# Patient Record
Sex: MALE | Race: WHITE | Employment: FULL TIME | URBAN - METROPOLITAN AREA
[De-identification: names, ages, dates, MRNs, and addresses within clinical notes are randomized per-mention and may not be internally consistent; named-entity substitution may affect disease eponyms.]

---

## 2018-03-29 ENCOUNTER — OFFICE VISIT (OUTPATIENT)
Dept: FAMILY MEDICINE CLINIC | Age: 43
End: 2018-03-29

## 2018-03-29 VITALS
SYSTOLIC BLOOD PRESSURE: 160 MMHG | OXYGEN SATURATION: 98 % | HEART RATE: 73 BPM | WEIGHT: 315 LBS | DIASTOLIC BLOOD PRESSURE: 100 MMHG | HEIGHT: 76 IN | BODY MASS INDEX: 38.36 KG/M2

## 2018-03-29 DIAGNOSIS — Z13.1 DIABETES MELLITUS SCREENING: ICD-10-CM

## 2018-03-29 DIAGNOSIS — E66.01 MORBID OBESITY (HCC): ICD-10-CM

## 2018-03-29 DIAGNOSIS — I10 ESSENTIAL HYPERTENSION: Primary | ICD-10-CM

## 2018-03-29 DIAGNOSIS — Z11.4 ENCOUNTER FOR SCREENING FOR HIV: ICD-10-CM

## 2018-03-29 DIAGNOSIS — Z13.29 THYROID DISORDER SCREEN: ICD-10-CM

## 2018-03-29 DIAGNOSIS — Z13.220 LIPID SCREENING: ICD-10-CM

## 2018-03-29 PROCEDURE — 99203 OFFICE O/P NEW LOW 30 MIN: CPT | Performed by: PHYSICIAN ASSISTANT

## 2018-03-29 RX ORDER — VALSARTAN 160 MG/1
160 TABLET ORAL DAILY
Qty: 30 TABLET | Refills: 2 | Status: SHIPPED | OUTPATIENT
Start: 2018-03-29 | End: 2018-07-06 | Stop reason: SDUPTHER

## 2018-03-29 RX ORDER — VALSARTAN 160 MG/1
160 TABLET ORAL DAILY
COMMUNITY
End: 2018-03-29 | Stop reason: SDUPTHER

## 2018-03-29 ASSESSMENT — PATIENT HEALTH QUESTIONNAIRE - PHQ9
1. LITTLE INTEREST OR PLEASURE IN DOING THINGS: 0
2. FEELING DOWN, DEPRESSED OR HOPELESS: 0
SUM OF ALL RESPONSES TO PHQ9 QUESTIONS 1 & 2: 0
SUM OF ALL RESPONSES TO PHQ QUESTIONS 1-9: 0

## 2018-03-29 ASSESSMENT — ENCOUNTER SYMPTOMS
CONSTIPATION: 0
SHORTNESS OF BREATH: 0
SORE THROAT: 0
COUGH: 0
RHINORRHEA: 0
ABDOMINAL PAIN: 0
DIARRHEA: 0
NAUSEA: 0
VOMITING: 0

## 2018-03-29 NOTE — PROGRESS NOTES
Topics    Smoking status: Never Smoker    Smokeless tobacco: Former User     Quit date: 2010    Alcohol use 0.6 oz/week     1 Shots of liquor per week      Comment: occasional    Drug use: No    Sexual activity: Yes     Partners: Female     Other Topics Concern    Not on file     Social History Narrative    No narrative on file     No Known Allergies    Current Outpatient Prescriptions   Medication Sig Dispense Refill    valsartan (DIOVAN) 160 MG tablet Take 1 tablet by mouth daily 30 tablet 2     No current facility-administered medications for this visit. Vitals:    03/29/18 1252   BP: (!) 160/100   Site: Left Arm   Position: Sitting   Cuff Size: Thigh   Pulse: 73   SpO2: 98%   Weight: (!) 414 lb (187.8 kg)   Height: 6' 4\" (1.93 m)     Estimated body mass index is 50.39 kg/m² as calculated from the following:    Height as of this encounter: 6' 4\" (1.93 m). Weight as of this encounter: 414 lb (187.8 kg). Physical Exam   Constitutional: He is oriented to person, place, and time. He appears well-developed and well-nourished. No distress. Morbidly obese male   HENT:   Head: Normocephalic and atraumatic. Eyes: Conjunctivae and EOM are normal. Pupils are equal, round, and reactive to light. Neck: Neck supple. Cardiovascular: Normal rate, regular rhythm and normal heart sounds. No murmur heard. Pulmonary/Chest: Effort normal and breath sounds normal. He has no wheezes. Abdominal: Soft. Bowel sounds are normal. There is no tenderness. Musculoskeletal: He exhibits no edema. Normal ROM of Bilateral upper and lower extremities and spine    Lymphadenopathy:     He has no cervical adenopathy. Neurological: He is alert and oriented to person, place, and time. He has normal reflexes. Skin: Skin is warm and dry. No rash noted. Psychiatric: He has a normal mood and affect. ASSESSMENT and PLAN:  Stephen Cardoso was seen today for established new doctor.     Diagnoses and all orders for this

## 2018-04-05 DIAGNOSIS — Z13.1 DIABETES MELLITUS SCREENING: ICD-10-CM

## 2018-04-05 DIAGNOSIS — Z13.220 LIPID SCREENING: ICD-10-CM

## 2018-04-05 DIAGNOSIS — E66.01 MORBID OBESITY (HCC): ICD-10-CM

## 2018-04-05 DIAGNOSIS — Z11.4 ENCOUNTER FOR SCREENING FOR HIV: ICD-10-CM

## 2018-04-05 DIAGNOSIS — Z13.29 THYROID DISORDER SCREEN: ICD-10-CM

## 2018-04-05 DIAGNOSIS — I10 ESSENTIAL HYPERTENSION: ICD-10-CM

## 2018-04-05 LAB
A/G RATIO: 1.7 (ref 1.1–2.2)
ALBUMIN SERPL-MCNC: 4.4 G/DL (ref 3.4–5)
ALP BLD-CCNC: 58 U/L (ref 40–129)
ALT SERPL-CCNC: 43 U/L (ref 10–40)
ANION GAP SERPL CALCULATED.3IONS-SCNC: 12 MMOL/L (ref 3–16)
AST SERPL-CCNC: 23 U/L (ref 15–37)
BILIRUB SERPL-MCNC: 0.5 MG/DL (ref 0–1)
BUN BLDV-MCNC: 15 MG/DL (ref 7–20)
CALCIUM SERPL-MCNC: 8.9 MG/DL (ref 8.3–10.6)
CHLORIDE BLD-SCNC: 103 MMOL/L (ref 99–110)
CHOLESTEROL, TOTAL: 177 MG/DL (ref 0–199)
CO2: 30 MMOL/L (ref 21–32)
CREAT SERPL-MCNC: 0.7 MG/DL (ref 0.9–1.3)
GFR AFRICAN AMERICAN: >60
GFR NON-AFRICAN AMERICAN: >60
GLOBULIN: 2.6 G/DL
GLUCOSE BLD-MCNC: 138 MG/DL (ref 70–99)
HCT VFR BLD CALC: 44.2 % (ref 40.5–52.5)
HDLC SERPL-MCNC: 30 MG/DL (ref 40–60)
HEMOGLOBIN: 15.1 G/DL (ref 13.5–17.5)
LDL CHOLESTEROL CALCULATED: 119 MG/DL
MCH RBC QN AUTO: 29.3 PG (ref 26–34)
MCHC RBC AUTO-ENTMCNC: 34.2 G/DL (ref 31–36)
MCV RBC AUTO: 85.5 FL (ref 80–100)
PDW BLD-RTO: 14 % (ref 12.4–15.4)
PLATELET # BLD: 270 K/UL (ref 135–450)
PMV BLD AUTO: 7.1 FL (ref 5–10.5)
POTASSIUM SERPL-SCNC: 4.5 MMOL/L (ref 3.5–5.1)
RBC # BLD: 5.17 M/UL (ref 4.2–5.9)
SODIUM BLD-SCNC: 145 MMOL/L (ref 136–145)
TOTAL PROTEIN: 7 G/DL (ref 6.4–8.2)
TRIGL SERPL-MCNC: 140 MG/DL (ref 0–150)
TSH REFLEX: 3.1 UIU/ML (ref 0.27–4.2)
VLDLC SERPL CALC-MCNC: 28 MG/DL
WBC # BLD: 6.2 K/UL (ref 4–11)

## 2018-04-06 ENCOUNTER — TELEPHONE (OUTPATIENT)
Dept: FAMILY MEDICINE CLINIC | Age: 43
End: 2018-04-06

## 2018-04-06 DIAGNOSIS — E11.9 TYPE 2 DIABETES MELLITUS WITHOUT COMPLICATION, WITHOUT LONG-TERM CURRENT USE OF INSULIN (HCC): Primary | ICD-10-CM

## 2018-04-06 LAB
ESTIMATED AVERAGE GLUCOSE: 231.7 MG/DL
HBA1C MFR BLD: 9.7 %
HIV AG/AB: NORMAL
HIV ANTIGEN: NORMAL
HIV-1 ANTIBODY: NORMAL
HIV-2 AB: NORMAL

## 2018-07-06 RX ORDER — VALSARTAN 160 MG/1
TABLET ORAL
Qty: 30 TABLET | Refills: 1 | Status: SHIPPED | OUTPATIENT
Start: 2018-07-06 | End: 2018-08-03 | Stop reason: ALTCHOICE

## 2018-08-03 DIAGNOSIS — I10 ESSENTIAL HYPERTENSION: Primary | ICD-10-CM

## 2018-08-03 RX ORDER — OLMESARTAN MEDOXOMIL 40 MG/1
40 TABLET ORAL DAILY
Qty: 30 TABLET | Refills: 3 | Status: SHIPPED | OUTPATIENT
Start: 2018-08-03 | End: 2018-08-06

## 2018-08-03 NOTE — TELEPHONE ENCOUNTER
Prescription for olmesartan 40 mg daily sent to pharmacy. Patient is due for OV please have him schedule.

## 2018-08-03 NOTE — TELEPHONE ENCOUNTER
Received fax from [facility] stating that Rx olmesartan medoxomi requires a Prior Auth - scanned into Epic

## 2018-08-06 ENCOUNTER — TELEPHONE (OUTPATIENT)
Dept: FAMILY MEDICINE CLINIC | Age: 43
End: 2018-08-06

## 2018-08-06 RX ORDER — LOSARTAN POTASSIUM 50 MG/1
50 TABLET ORAL DAILY
Qty: 30 TABLET | Refills: 3 | Status: SHIPPED | OUTPATIENT
Start: 2018-08-06 | End: 2018-10-25

## 2018-08-06 NOTE — TELEPHONE ENCOUNTER
Submitted PA via [facility] for Olmesartan Medoxomil 40MG OR TABS. Marti MEJIA Case ID: [id number]  Rx #: [id number].  Status PENDING

## 2018-10-22 ENCOUNTER — TELEPHONE (OUTPATIENT)
Dept: FAMILY MEDICINE CLINIC | Age: 43
End: 2018-10-22

## 2018-10-25 ENCOUNTER — OFFICE VISIT (OUTPATIENT)
Dept: FAMILY MEDICINE CLINIC | Age: 43
End: 2018-10-25
Payer: COMMERCIAL

## 2018-10-25 VITALS
DIASTOLIC BLOOD PRESSURE: 110 MMHG | HEART RATE: 78 BPM | BODY MASS INDEX: 38.36 KG/M2 | HEIGHT: 76 IN | OXYGEN SATURATION: 96 % | SYSTOLIC BLOOD PRESSURE: 152 MMHG | WEIGHT: 315 LBS

## 2018-10-25 DIAGNOSIS — I10 ESSENTIAL HYPERTENSION: ICD-10-CM

## 2018-10-25 DIAGNOSIS — Z79.4 TYPE 2 DIABETES MELLITUS WITHOUT COMPLICATION, WITH LONG-TERM CURRENT USE OF INSULIN (HCC): Primary | ICD-10-CM

## 2018-10-25 DIAGNOSIS — E11.9 TYPE 2 DIABETES MELLITUS WITHOUT COMPLICATION, WITH LONG-TERM CURRENT USE OF INSULIN (HCC): Primary | ICD-10-CM

## 2018-10-25 LAB — HBA1C MFR BLD: 5.4 %

## 2018-10-25 PROCEDURE — 99214 OFFICE O/P EST MOD 30 MIN: CPT | Performed by: PHYSICIAN ASSISTANT

## 2018-10-25 PROCEDURE — 83036 HEMOGLOBIN GLYCOSYLATED A1C: CPT | Performed by: PHYSICIAN ASSISTANT

## 2018-10-25 RX ORDER — BLOOD-GLUCOSE METER
1 EACH MISCELLANEOUS DAILY
Qty: 1 KIT | Refills: 0 | Status: SHIPPED | OUTPATIENT
Start: 2018-10-25

## 2018-10-25 RX ORDER — VALSARTAN 160 MG/1
TABLET ORAL
Qty: 30 TABLET | Refills: 1 | Status: SHIPPED | OUTPATIENT
Start: 2018-10-25 | End: 2018-12-19 | Stop reason: SDUPTHER

## 2018-10-25 RX ORDER — LANCETS 30 GAUGE
EACH MISCELLANEOUS
Qty: 100 EACH | Refills: 1 | Status: SHIPPED | OUTPATIENT
Start: 2018-10-25 | End: 2022-01-13 | Stop reason: SDUPTHER

## 2018-10-25 RX ORDER — AMLODIPINE BESYLATE 5 MG/1
5 TABLET ORAL DAILY
Qty: 30 TABLET | Refills: 3 | Status: SHIPPED | OUTPATIENT
Start: 2018-10-25 | End: 2019-04-02 | Stop reason: SDUPTHER

## 2018-10-25 NOTE — PROGRESS NOTES
10/26/2018  Nancy Azul (: 1975)  37 y.o. HPI  Patient presents for routine follow up of chronic conditions. HTN: Recently switched from valsartan to losartan due to recall. Patient does not monitor at home but reports his BP Has been high at doctor's appointments and occasionally at work. Denies chest pain, HA, LE swelling. DM: newly diagnosed in 3/2018. Started on metformin. Patient has only been taking one tablet once daily. Has been trying to follow low carb diet. Has lost weight. No current exercise regimen. Gets annual eye exams as he wears glasses ([facility]     Review of Systems    Allergies, past medical history, family history, and social history reviewed and unchanged from previous encounter. Current Outpatient Prescriptions   Medication Sig Dispense Refill    valsartan (DIOVAN) 160 MG tablet TAKE ONE TABLET BY MOUTH DAILY 30 tablet 1    amLODIPine (NORVASC) 5 MG tablet Take 1 tablet by mouth daily 30 tablet 3    Blood Glucose Monitoring Suppl (ACCU-CHEK ERLIN PLUS) w/Device KIT 1 Device by Does not apply route daily 1 kit 0    blood glucose test strips (ASCENSIA AUTODISC VI;ONE TOUCH ULTRA TEST VI) strip Check glucose daily. DM 2 E11.9 100 each 3    Lancets MISC Check glucose daily. DM 2 E11.65. 100 each 1    diclofenac (VOLTAREN) 75 MG EC tablet Take 1 tablet by mouth 2 times daily 60 tablet 3    metFORMIN (GLUCOPHAGE) 500 MG tablet Take 1 tablet by mouth 2 times daily (with meals) Start 500 mg once daily x 1 week, if tolerating well then start 500mg BID with meals. 60 tablet 3     No current facility-administered medications for this visit.         Vitals:    10/25/18 0906 10/25/18 0910   BP: (!) 170/100 (!) 152/110   Site: Right Upper Arm Right Upper Arm   Position: Sitting Sitting   Cuff Size: Large Adult Large Adult   Pulse: 78    SpO2: 96%    Weight: (!) 395 lb 3.2 oz (179.3 kg)    Height: 6' 4\" (1.93 m)      Estimated body mass index is 48.11 kg/m² as

## 2018-12-19 DIAGNOSIS — I10 ESSENTIAL HYPERTENSION: ICD-10-CM

## 2018-12-20 RX ORDER — VALSARTAN 160 MG/1
TABLET ORAL
Qty: 30 TABLET | Refills: 0 | Status: SHIPPED | OUTPATIENT
Start: 2018-12-20 | End: 2019-01-18 | Stop reason: SDUPTHER

## 2018-12-20 NOTE — TELEPHONE ENCOUNTER
.  Last office visit 10/25/2018     Last written 10/25/18 #30 with 1 refill    Next office visit scheduled none    Requested Prescriptions     Pending Prescriptions Disp Refills    valsartan (DIOVAN) 160 MG tablet [Pharmacy Med Name: VALSARTAN 160MG TABLETS] 30 tablet 0     Sig: TAKE 1 TABLET BY MOUTH EVERY DAY

## 2019-01-18 DIAGNOSIS — I10 ESSENTIAL HYPERTENSION: ICD-10-CM

## 2019-01-21 RX ORDER — VALSARTAN 160 MG/1
TABLET ORAL
Qty: 30 TABLET | Refills: 0 | Status: SHIPPED | OUTPATIENT
Start: 2019-01-21 | End: 2019-02-23 | Stop reason: SDUPTHER

## 2019-02-23 DIAGNOSIS — I10 ESSENTIAL HYPERTENSION: ICD-10-CM

## 2019-02-25 RX ORDER — VALSARTAN 160 MG/1
TABLET ORAL
Qty: 30 TABLET | Refills: 0 | Status: SHIPPED | OUTPATIENT
Start: 2019-02-25 | End: 2019-03-30 | Stop reason: SDUPTHER

## 2019-03-30 DIAGNOSIS — I10 ESSENTIAL HYPERTENSION: ICD-10-CM

## 2019-04-01 RX ORDER — VALSARTAN 160 MG/1
TABLET ORAL
Qty: 30 TABLET | Refills: 2 | Status: SHIPPED | OUTPATIENT
Start: 2019-04-01 | End: 2019-04-15 | Stop reason: SDUPTHER

## 2019-04-02 DIAGNOSIS — I10 ESSENTIAL HYPERTENSION: ICD-10-CM

## 2019-04-02 NOTE — TELEPHONE ENCOUNTER
.  Last office visit 10/25/2018     Last written 10/25/18 #30 3 refills    Next office visit scheduled 4/15/2019    Requested Prescriptions     Pending Prescriptions Disp Refills    amLODIPine (NORVASC) 5 MG tablet [Pharmacy Med Name: amLODIPine BESYLATE 5 MG TAB] 30 tablet 2     Sig: TAKE ONE TABLET BY MOUTH DAILY

## 2019-04-03 RX ORDER — AMLODIPINE BESYLATE 5 MG/1
TABLET ORAL
Qty: 30 TABLET | Refills: 2 | Status: SHIPPED | OUTPATIENT
Start: 2019-04-03 | End: 2019-07-08 | Stop reason: SDUPTHER

## 2019-04-15 ENCOUNTER — OFFICE VISIT (OUTPATIENT)
Dept: FAMILY MEDICINE CLINIC | Age: 44
End: 2019-04-15
Payer: COMMERCIAL

## 2019-04-15 VITALS
HEART RATE: 76 BPM | BODY MASS INDEX: 38.36 KG/M2 | SYSTOLIC BLOOD PRESSURE: 170 MMHG | WEIGHT: 315 LBS | DIASTOLIC BLOOD PRESSURE: 110 MMHG | HEIGHT: 76 IN | RESPIRATION RATE: 16 BRPM | OXYGEN SATURATION: 99 % | TEMPERATURE: 98.3 F

## 2019-04-15 DIAGNOSIS — E78.1 PURE HYPERGLYCERIDEMIA: ICD-10-CM

## 2019-04-15 DIAGNOSIS — I10 ESSENTIAL HYPERTENSION: ICD-10-CM

## 2019-04-15 DIAGNOSIS — E11.9 TYPE 2 DIABETES MELLITUS WITHOUT COMPLICATION, WITHOUT LONG-TERM CURRENT USE OF INSULIN (HCC): Primary | ICD-10-CM

## 2019-04-15 LAB — HBA1C MFR BLD: 7.3 %

## 2019-04-15 PROCEDURE — 83036 HEMOGLOBIN GLYCOSYLATED A1C: CPT | Performed by: PHYSICIAN ASSISTANT

## 2019-04-15 PROCEDURE — 99214 OFFICE O/P EST MOD 30 MIN: CPT | Performed by: PHYSICIAN ASSISTANT

## 2019-04-15 RX ORDER — VALSARTAN 160 MG/1
160 TABLET ORAL 2 TIMES DAILY
Qty: 180 TABLET | Refills: 1 | Status: SHIPPED | OUTPATIENT
Start: 2019-04-15 | End: 2019-07-18

## 2019-04-15 RX ORDER — ATORVASTATIN CALCIUM 20 MG/1
20 TABLET, FILM COATED ORAL DAILY
Qty: 30 TABLET | Refills: 2 | Status: SHIPPED | OUTPATIENT
Start: 2019-04-15 | End: 2019-07-10 | Stop reason: SDUPTHER

## 2019-04-15 ASSESSMENT — PATIENT HEALTH QUESTIONNAIRE - PHQ9
SUM OF ALL RESPONSES TO PHQ9 QUESTIONS 1 & 2: 0
SUM OF ALL RESPONSES TO PHQ QUESTIONS 1-9: 0
1. LITTLE INTEREST OR PLEASURE IN DOING THINGS: 0
2. FEELING DOWN, DEPRESSED OR HOPELESS: 0
SUM OF ALL RESPONSES TO PHQ QUESTIONS 1-9: 0

## 2019-04-15 ASSESSMENT — ENCOUNTER SYMPTOMS
ABDOMINAL PAIN: 0
SHORTNESS OF BREATH: 0
CONSTIPATION: 0
DIARRHEA: 0
VOMITING: 0
NAUSEA: 0
RHINORRHEA: 0
COUGH: 0
SORE THROAT: 0

## 2019-04-15 NOTE — PROGRESS NOTES
strip Check glucose daily. DM 2 E11.9 100 each 3    Lancets MISC Check glucose daily. DM 2 E11.65. 100 each 1    metFORMIN (GLUCOPHAGE) 500 MG tablet Take 1 tablet by mouth 2 times daily (with meals) Start 500 mg once daily x 1 week, if tolerating well then start 500mg BID with meals. 60 tablet 3     No current facility-administered medications for this visit. Vitals:    04/15/19 0826   BP: (!) 170/110   Site: Left Upper Arm   Position: Sitting   Cuff Size: Large Adult   Pulse: 76   Resp: 16   Temp: 98.3 °F (36.8 °C)   TempSrc: Oral   SpO2: 99%   Weight: (!) 427 lb 9.6 oz (194 kg)   Height: 6' 4\" (1.93 m)     Estimated body mass index is 52.05 kg/m² as calculated from the following:    Height as of this encounter: 6' 4\" (1.93 m). Weight as of this encounter: 427 lb 9.6 oz (194 kg). Physical Exam   Constitutional: He is oriented to person, place, and time. He appears well-developed and well-nourished. No distress. obese   HENT:   Head: Normocephalic and atraumatic. Eyes: Pupils are equal, round, and reactive to light. Conjunctivae and EOM are normal.   Neck: Neck supple. Cardiovascular: Normal rate, regular rhythm and normal heart sounds. No murmur heard. Pulmonary/Chest: Effort normal and breath sounds normal. He has no wheezes. Abdominal: Soft. Bowel sounds are normal. There is no tenderness. Musculoskeletal: He exhibits no edema. Lymphadenopathy:     He has no cervical adenopathy. Neurological: He is alert and oriented to person, place, and time. He has normal reflexes. Skin: Skin is warm and dry. No rash noted. Psychiatric: He has a normal mood and affect. ASSESSMENT and PLAN:  Dejan Khan was seen today for hypertension.     Diagnoses and all orders for this visit:    Type 2 diabetes mellitus without complication, without long-term current use of insulin ([facility])  -     POCT glycosylated hemoglobin (Hb A1C) 7.3  - Encouraged BG monitoring qam and adherence to low carbohydrate diet.   - Continue metformin 500 mg BID  - Needs eye exam     Essential hypertension  -     valsartan (DIOVAN) 160 MG tablet; Take 1 tablet by mouth 2 times daily  - Increase diovan today, may need to double amlodipine at next appointment  - Pt instructed to keep BP log and bring to next appointment    Pure hyperglyceridemia  -     atorvastatin (LIPITOR) 20 MG tablet; Take 1 tablet by mouth daily  - LDL elevated on last year labs, TC normal. Given diabetes and obesity will start statin. Fasting labs at 2 week follow up. Return in about 2 weeks (around 4/29/2019) for HTN.

## 2019-04-29 ENCOUNTER — OFFICE VISIT (OUTPATIENT)
Dept: FAMILY MEDICINE CLINIC | Age: 44
End: 2019-04-29
Payer: COMMERCIAL

## 2019-04-29 VITALS
HEART RATE: 94 BPM | DIASTOLIC BLOOD PRESSURE: 108 MMHG | SYSTOLIC BLOOD PRESSURE: 160 MMHG | HEIGHT: 76 IN | BODY MASS INDEX: 38.36 KG/M2 | WEIGHT: 315 LBS | OXYGEN SATURATION: 95 %

## 2019-04-29 DIAGNOSIS — M54.50 LUMBAR BACK PAIN: ICD-10-CM

## 2019-04-29 DIAGNOSIS — I10 ESSENTIAL HYPERTENSION: Primary | ICD-10-CM

## 2019-04-29 DIAGNOSIS — E78.5 HYPERLIPIDEMIA, UNSPECIFIED HYPERLIPIDEMIA TYPE: ICD-10-CM

## 2019-04-29 LAB
A/G RATIO: 1.5 (ref 1.1–2.2)
ALBUMIN SERPL-MCNC: 4.5 G/DL (ref 3.4–5)
ALP BLD-CCNC: 70 U/L (ref 40–129)
ALT SERPL-CCNC: 28 U/L (ref 10–40)
ANION GAP SERPL CALCULATED.3IONS-SCNC: 12 MMOL/L (ref 3–16)
AST SERPL-CCNC: 17 U/L (ref 15–37)
BILIRUB SERPL-MCNC: 0.7 MG/DL (ref 0–1)
BUN BLDV-MCNC: 13 MG/DL (ref 7–20)
CALCIUM SERPL-MCNC: 8.9 MG/DL (ref 8.3–10.6)
CHLORIDE BLD-SCNC: 99 MMOL/L (ref 99–110)
CHOLESTEROL, TOTAL: 172 MG/DL (ref 0–199)
CO2: 29 MMOL/L (ref 21–32)
CREAT SERPL-MCNC: 0.8 MG/DL (ref 0.9–1.3)
GFR AFRICAN AMERICAN: >60
GFR NON-AFRICAN AMERICAN: >60
GLOBULIN: 3.1 G/DL
GLUCOSE BLD-MCNC: 271 MG/DL (ref 70–99)
HCT VFR BLD CALC: 45.9 % (ref 40.5–52.5)
HDLC SERPL-MCNC: 30 MG/DL (ref 40–60)
HEMOGLOBIN: 15.4 G/DL (ref 13.5–17.5)
LDL CHOLESTEROL CALCULATED: 110 MG/DL
MCH RBC QN AUTO: 28.1 PG (ref 26–34)
MCHC RBC AUTO-ENTMCNC: 33.5 G/DL (ref 31–36)
MCV RBC AUTO: 83.9 FL (ref 80–100)
PDW BLD-RTO: 14.2 % (ref 12.4–15.4)
PLATELET # BLD: 282 K/UL (ref 135–450)
PMV BLD AUTO: 7.4 FL (ref 5–10.5)
POTASSIUM SERPL-SCNC: 4 MMOL/L (ref 3.5–5.1)
RBC # BLD: 5.47 M/UL (ref 4.2–5.9)
SODIUM BLD-SCNC: 140 MMOL/L (ref 136–145)
TOTAL PROTEIN: 7.6 G/DL (ref 6.4–8.2)
TRIGL SERPL-MCNC: 161 MG/DL (ref 0–150)
VLDLC SERPL CALC-MCNC: 32 MG/DL
WBC # BLD: 6 K/UL (ref 4–11)

## 2019-04-29 PROCEDURE — 99213 OFFICE O/P EST LOW 20 MIN: CPT | Performed by: PHYSICIAN ASSISTANT

## 2019-04-29 RX ORDER — CYCLOBENZAPRINE HCL 5 MG
5 TABLET ORAL 3 TIMES DAILY PRN
Qty: 30 TABLET | Refills: 0 | Status: SHIPPED | OUTPATIENT
Start: 2019-04-29 | End: 2019-05-09

## 2019-04-29 ASSESSMENT — ENCOUNTER SYMPTOMS
SORE THROAT: 0
VOMITING: 0
COUGH: 0
DIARRHEA: 0
BACK PAIN: 1
ABDOMINAL PAIN: 0
NAUSEA: 0
RHINORRHEA: 0
CONSTIPATION: 0
SHORTNESS OF BREATH: 0

## 2019-04-29 NOTE — PROGRESS NOTES
2019  Rob Post Sons (: 1975)  37 y.o. HPI     Patient presents for 1 month blood pressure check. Started on amlodipine in addition to valsartan at last appointment. Just started last week. Has been checking at home, averaging 150s/90s. Denies HA, CP, SOA and LE swelling. Lumbar back pain: patient reports that 3 days was doing home repair and pulled muscle in low back. This is not the first time it has happened. Patient reports it is right sided, does not radiate into the buttock or down the leg. Denies bowel/bladder incontinence, saddle anesthesia, LE weakness and numbness tingling. Has not been taking anything for the pain. Patient declines vaccines today and states that he cannot give urine sample. Review of Systems   Constitutional: Negative for activity change, chills and fever. HENT: Negative for congestion, ear pain, rhinorrhea and sore throat. Eyes: Negative for visual disturbance. Respiratory: Negative for cough and shortness of breath. Cardiovascular: Negative for chest pain and palpitations. Gastrointestinal: Negative for abdominal pain, constipation, diarrhea, nausea and vomiting. Genitourinary: Negative for difficulty urinating and dysuria. Musculoskeletal: Positive for back pain (lumbar). Negative for arthralgias and myalgias. Skin: Negative for rash. Neurological: Negative for dizziness, weakness and numbness. Psychiatric/Behavioral: Negative for sleep disturbance. Allergies, past medical history, family history, and social history reviewed and unchanged from previous encounter.      Current Outpatient Medications   Medication Sig Dispense Refill    cyclobenzaprine (FLEXERIL) 5 MG tablet Take 1 tablet by mouth 3 times daily as needed for Muscle spasms 30 tablet 0    valsartan (DIOVAN) 160 MG tablet Take 1 tablet by mouth 2 times daily 180 tablet 1    atorvastatin (LIPITOR) 20 MG tablet Take 1 tablet by mouth daily 30 tablet 2    amLODIPine (NORVASC) 5 MG tablet TAKE ONE TABLET BY MOUTH DAILY 30 tablet 2    Blood Glucose Monitoring Suppl (ACCU-CHEK ERLIN PLUS) w/Device KIT 1 Device by Does not apply route daily 1 kit 0    blood glucose test strips (ASCENSIA AUTODISC VI;ONE TOUCH ULTRA TEST VI) strip Check glucose daily. DM 2 E11.9 100 each 3    Lancets MISC Check glucose daily. DM 2 E11.65. 100 each 1    metFORMIN (GLUCOPHAGE) 500 MG tablet Take 1 tablet by mouth 2 times daily (with meals) Start 500 mg once daily x 1 week, if tolerating well then start 500mg BID with meals. 60 tablet 3     No current facility-administered medications for this visit. Vitals:    04/29/19 0819 04/29/19 0852   BP: (!) 160/94 (!) 160/108   Site: Left Upper Arm Left Upper Arm   Position: Sitting Sitting   Cuff Size: Large Adult Thigh   Pulse: 94    SpO2: 95%    Weight: (!) 423 lb 9.6 oz (192.1 kg)    Height: 6' 4\" (1.93 m)      Estimated body mass index is 51.56 kg/m² as calculated from the following:    Height as of this encounter: 6' 4\" (1.93 m). Weight as of this encounter: 423 lb 9.6 oz (192.1 kg). Physical Exam   Constitutional: He is oriented to person, place, and time. He appears well-developed and well-nourished. No distress. HENT:   Head: Normocephalic and atraumatic. Eyes: Pupils are equal, round, and reactive to light. Conjunctivae and EOM are normal.   Neck: Neck supple. Cardiovascular: Normal rate, regular rhythm and normal heart sounds. No murmur heard. Pulmonary/Chest: Effort normal and breath sounds normal. He has no wheezes. Abdominal: Soft. Bowel sounds are normal. There is no tenderness. Musculoskeletal: He exhibits no edema. Pain with palpation of right sided lumbar paraspinal muscles. No midline tenderness. Lymphadenopathy:     He has no cervical adenopathy. Neurological: He is alert and oriented to person, place, and time. He has normal reflexes. Skin: Skin is warm and dry. No rash noted.    Psychiatric: He has a normal mood and affect. ASSESSMENT and PLAN:  Erum Hall was seen today for blood pressure check. Diagnoses and all orders for this visit:    Essential hypertension  -     COMPREHENSIVE METABOLIC PANEL  -     CBC  - Continue log, offered My Chart flow sheet, declined   - Elevated in the office, log reviewed improved at home. Anticipate amlodipine to become more effective over the next week. Call office if BP does not come down further. Hyperlipidemia, unspecified hyperlipidemia type  -     LIPID PANEL    Lumbar back pain  -     cyclobenzaprine (FLEXERIL) 5 MG tablet; Take 1 tablet by mouth 3 times daily as needed for Muscle spasms  - Flexeril and 600 mg ibuprofen prn. May benefit from steroid, but given BP and DM do not feel appropriate.   - Gentle stretching in a few days. Return in about 3 months (around 7/29/2019) for HTN.

## 2019-07-08 DIAGNOSIS — I10 ESSENTIAL HYPERTENSION: ICD-10-CM

## 2019-07-08 RX ORDER — AMLODIPINE BESYLATE 5 MG/1
TABLET ORAL
Qty: 30 TABLET | Refills: 1 | Status: SHIPPED | OUTPATIENT
Start: 2019-07-08 | End: 2019-08-01

## 2019-07-10 DIAGNOSIS — E78.1 PURE HYPERGLYCERIDEMIA: ICD-10-CM

## 2019-07-10 RX ORDER — ATORVASTATIN CALCIUM 20 MG/1
TABLET, FILM COATED ORAL
Qty: 90 TABLET | Refills: 1 | Status: SHIPPED | OUTPATIENT
Start: 2019-07-10 | End: 2022-01-13 | Stop reason: SDUPTHER

## 2019-07-18 ENCOUNTER — OFFICE VISIT (OUTPATIENT)
Dept: FAMILY MEDICINE CLINIC | Age: 44
End: 2019-07-18
Payer: COMMERCIAL

## 2019-07-18 VITALS
OXYGEN SATURATION: 97 % | SYSTOLIC BLOOD PRESSURE: 158 MMHG | DIASTOLIC BLOOD PRESSURE: 68 MMHG | HEART RATE: 83 BPM | BODY MASS INDEX: 52.34 KG/M2 | WEIGHT: 315 LBS

## 2019-07-18 DIAGNOSIS — I10 ESSENTIAL HYPERTENSION: Primary | ICD-10-CM

## 2019-07-18 PROCEDURE — 99214 OFFICE O/P EST MOD 30 MIN: CPT | Performed by: FAMILY MEDICINE

## 2019-07-18 RX ORDER — LISINOPRIL 40 MG/1
40 TABLET ORAL DAILY
Qty: 30 TABLET | Refills: 0 | Status: SHIPPED | OUTPATIENT
Start: 2019-07-18 | End: 2019-08-01 | Stop reason: SDUPTHER

## 2019-07-18 RX ORDER — BLOOD PRESSURE TEST KIT
1 KIT MISCELLANEOUS DAILY
Qty: 1 KIT | Refills: 0 | Status: SHIPPED | OUTPATIENT
Start: 2019-07-18

## 2019-07-18 ASSESSMENT — ENCOUNTER SYMPTOMS
ABDOMINAL PAIN: 0
NAUSEA: 0
CHEST TIGHTNESS: 0
COUGH: 0
VOMITING: 0
SHORTNESS OF BREATH: 0

## 2019-07-18 NOTE — PROGRESS NOTES
organizations: Not on file     Relationship status: Not on file    Intimate partner violence:     Fear of current or ex partner: Not on file     Emotionally abused: Not on file     Physically abused: Not on file     Forced sexual activity: Not on file   Other Topics Concern    Not on file   Social History Narrative    Not on file       Family History   Problem Relation Age of Onset    Stroke Maternal Grandfather        Current Outpatient Medications   Medication Sig Dispense Refill    lisinopril (PRINIVIL;ZESTRIL) 40 MG tablet Take 1 tablet by mouth daily 30 tablet 0    Blood Pressure KIT 1 kit by Does not apply route daily 1 kit 0    atorvastatin (LIPITOR) 20 MG tablet TAKE ONE TABLET BY MOUTH DAILY 90 tablet 1    amLODIPine (NORVASC) 5 MG tablet TAKE ONE TABLET BY MOUTH DAILY 30 tablet 1    Blood Glucose Monitoring Suppl (ACCU-CHEK ERLIN PLUS) w/Device KIT 1 Device by Does not apply route daily 1 kit 0    blood glucose test strips (ASCENSIA AUTODISC VI;ONE TOUCH ULTRA TEST VI) strip Check glucose daily. DM 2 E11.9 100 each 3    Lancets MISC Check glucose daily. DM 2 E11.65. 100 each 1    metFORMIN (GLUCOPHAGE) 500 MG tablet Take 1 tablet by mouth 2 times daily (with meals) Start 500 mg once daily x 1 week, if tolerating well then start 500mg BID with meals. 60 tablet 3     No current facility-administered medications for this visit. There is no immunization history on file for this patient. No Known Allergies    Review of Systems   Constitutional: Negative for activity change, fatigue and fever. HENT: Negative for congestion and tinnitus. Eyes: Negative for visual disturbance. Respiratory: Negative for cough, chest tightness and shortness of breath. Cardiovascular: Negative for chest pain, palpitations and leg swelling. Gastrointestinal: Negative for abdominal pain, nausea and vomiting. Genitourinary: Negative for decreased urine volume and difficulty urinating.    Skin:

## 2019-07-18 NOTE — PATIENT INSTRUCTIONS
Patient Education         High Blood Pressure: The DASH Diet (02:03)  Your health professional recommends that you watch this short online health video. Learn how the DASH eating plan can help lower your blood pressure. How to watch the video    Scan the QR code   OR Visit the website    [email or web address] [email or web address]   Current as of: July 22, 2018  Content Version: 12.0  © 0040-1575 Healthwise, [employer]. Care instructions adapted under license by [facility] ([facility]). If you have questions about a medical condition or this instruction, always ask your healthcare professional. [place] [address] any warranty or liability for your use of this information. Eat 5 - 6 servings of fruit per day. Locally grown fresh fruit has the most antioxidants. If they are not available, frozen fruit is the next best.     Eat more fresh vegetables, olive oil, and a handful of nuts (unsalted) every day. Make all your grains (breads, pasta, rice) 'whole grain' only to increase natural fiber in your diet     Fish has healthy omega-3 oils. Eating fish twice a week has been shown to improve health. If you take fish oil, take at least 1,000mg EPA + DHA a day (you must look at the food label on the back of the bottle and add up these omega-3s to know how much of this beneficial nutrient is contained in the product). There is more evidence that eating fish improves health more than taking fish oi supplements. Eating eggs has benefit if you eat the high omega-3 eggs. In these eggs, the yolks are good for you. At [employer], go to the health food section and get the 660mg omega-3 eggs. These are really good for you. The chickens are fed fish, and the benefits of the fish are imparted into the egg yolk. If you have diabetes, you should probably avoid eggs.      Current research shows that a pesco-vegetarian diet (eating a plant based diet with fish) is the best for improving longevity and reducing cardiovascular

## 2019-07-25 ENCOUNTER — TELEPHONE (OUTPATIENT)
Dept: FAMILY MEDICINE CLINIC | Age: 44
End: 2019-07-25

## 2019-07-25 ENCOUNTER — NURSE ONLY (OUTPATIENT)
Dept: FAMILY MEDICINE CLINIC | Age: 44
End: 2019-07-25

## 2019-07-25 VITALS — DIASTOLIC BLOOD PRESSURE: 105 MMHG | SYSTOLIC BLOOD PRESSURE: 160 MMHG

## 2019-07-25 DIAGNOSIS — Z01.30 BLOOD PRESSURE CHECK: Primary | ICD-10-CM

## 2019-08-01 ENCOUNTER — OFFICE VISIT (OUTPATIENT)
Dept: FAMILY MEDICINE CLINIC | Age: 44
End: 2019-08-01
Payer: COMMERCIAL

## 2019-08-01 VITALS
SYSTOLIC BLOOD PRESSURE: 162 MMHG | WEIGHT: 315 LBS | DIASTOLIC BLOOD PRESSURE: 90 MMHG | RESPIRATION RATE: 18 BRPM | HEIGHT: 76 IN | OXYGEN SATURATION: 97 % | TEMPERATURE: 97.7 F | BODY MASS INDEX: 38.36 KG/M2 | HEART RATE: 79 BPM

## 2019-08-01 DIAGNOSIS — I10 ESSENTIAL HYPERTENSION: Primary | ICD-10-CM

## 2019-08-01 DIAGNOSIS — E11.9 TYPE 2 DIABETES MELLITUS WITHOUT COMPLICATION, WITHOUT LONG-TERM CURRENT USE OF INSULIN (HCC): ICD-10-CM

## 2019-08-01 LAB — HBA1C MFR BLD: 7.8 %

## 2019-08-01 PROCEDURE — 83036 HEMOGLOBIN GLYCOSYLATED A1C: CPT | Performed by: PHYSICIAN ASSISTANT

## 2019-08-01 PROCEDURE — 99213 OFFICE O/P EST LOW 20 MIN: CPT | Performed by: PHYSICIAN ASSISTANT

## 2019-08-01 RX ORDER — HYDROCHLOROTHIAZIDE 25 MG/1
25 TABLET ORAL EVERY MORNING
Qty: 90 TABLET | Refills: 1 | Status: SHIPPED
Start: 2019-08-01 | End: 2020-05-15 | Stop reason: CLARIF

## 2019-08-01 RX ORDER — LISINOPRIL 40 MG/1
40 TABLET ORAL DAILY
Qty: 90 TABLET | Refills: 1 | Status: SHIPPED | OUTPATIENT
Start: 2019-08-01 | End: 2020-02-03

## 2019-08-01 RX ORDER — AMLODIPINE BESYLATE 10 MG/1
10 TABLET ORAL DAILY
Qty: 90 TABLET | Refills: 1 | Status: SHIPPED | OUTPATIENT
Start: 2019-08-01 | End: 2020-01-29 | Stop reason: SDUPTHER

## 2019-08-01 ASSESSMENT — ENCOUNTER SYMPTOMS
SORE THROAT: 0
RHINORRHEA: 0
COUGH: 0
VOMITING: 0
ABDOMINAL PAIN: 0
DIARRHEA: 0
NAUSEA: 0
CONSTIPATION: 0
SHORTNESS OF BREATH: 0

## 2019-08-01 NOTE — PROGRESS NOTES
every morning  -     amLODIPine (NORVASC) 10 MG tablet; Take 1 tablet by mouth daily  -     lisinopril (PRINIVIL;ZESTRIL) 40 MG tablet; Take 1 tablet by mouth daily  - Add hctz. Discussed the importance of home monitoring.   - The patient is advised to begin progressive daily aerobic exercise program, attempt to lose weight, reduce salt in diet and cooking and improve dietary compliance. Type 2 diabetes mellitus without complication, without long-term current use of insulin ([facility])  -     POCT microalbumin- patient unable to give sample   -     POCT glycosylated hemoglobin (Hb A1C) 7.8  - Increase metformin to 1000 mg BID  - See orders for this visit as documented in the electronic medical record. Issues reviewed with him: diabetic diet discussed in detail, written exchange diet given, home glucose monitoring emphasized, all medications, side effects and compliance discussed carefully, annual eye examinations at Ophthalmology discussed and long term diabetic complications discussed. Return in about 4 weeks (around 8/29/2019) for HTN.

## 2019-09-12 DIAGNOSIS — E11.9 TYPE 2 DIABETES MELLITUS WITHOUT COMPLICATION, WITHOUT LONG-TERM CURRENT USE OF INSULIN (HCC): ICD-10-CM

## 2020-01-29 RX ORDER — AMLODIPINE BESYLATE 10 MG/1
10 TABLET ORAL DAILY
Qty: 90 TABLET | Refills: 1 | Status: SHIPPED | OUTPATIENT
Start: 2020-01-29 | End: 2020-04-01 | Stop reason: SDUPTHER

## 2020-03-30 ENCOUNTER — TELEPHONE (OUTPATIENT)
Dept: FAMILY MEDICINE CLINIC | Age: 45
End: 2020-03-30

## 2020-04-01 ENCOUNTER — TELEMEDICINE (OUTPATIENT)
Dept: FAMILY MEDICINE CLINIC | Age: 45
End: 2020-04-01
Payer: COMMERCIAL

## 2020-04-01 PROCEDURE — 99213 OFFICE O/P EST LOW 20 MIN: CPT | Performed by: PHYSICIAN ASSISTANT

## 2020-04-01 RX ORDER — AMLODIPINE BESYLATE 10 MG/1
10 TABLET ORAL DAILY
Qty: 90 TABLET | Refills: 1 | Status: SHIPPED | OUTPATIENT
Start: 2020-04-01 | End: 2020-12-30 | Stop reason: SDUPTHER

## 2020-04-01 RX ORDER — ROSUVASTATIN CALCIUM 10 MG/1
10 TABLET, COATED ORAL DAILY
Qty: 90 TABLET | Refills: 1 | Status: SHIPPED | OUTPATIENT
Start: 2020-04-01 | End: 2022-01-13

## 2020-04-01 RX ORDER — LOSARTAN POTASSIUM 50 MG/1
50 TABLET ORAL DAILY
Qty: 90 TABLET | Refills: 1 | Status: SHIPPED | OUTPATIENT
Start: 2020-04-01 | End: 2020-04-06

## 2020-04-01 ASSESSMENT — ENCOUNTER SYMPTOMS
RHINORRHEA: 0
COUGH: 0
SHORTNESS OF BREATH: 0
SORE THROAT: 0
ABDOMINAL PAIN: 0
CONSTIPATION: 0
DIARRHEA: 0
NAUSEA: 0
VOMITING: 0

## 2020-04-01 NOTE — PROGRESS NOTES
chills and fever. HENT: Negative for congestion, ear pain, rhinorrhea and sore throat. Eyes: Negative for visual disturbance. Respiratory: Negative for cough and shortness of breath. Cardiovascular: Negative for chest pain and palpitations. Gastrointestinal: Negative for abdominal pain, constipation, diarrhea, nausea and vomiting. Genitourinary: Negative for difficulty urinating and dysuria. Musculoskeletal: Negative for arthralgias and myalgias. Skin: Negative for rash. Neurological: Negative for dizziness, weakness and numbness. Psychiatric/Behavioral: Negative for sleep disturbance. Allergies, past medical history, family history, and social history reviewed and unchanged from previous encounter. Current Outpatient Medications   Medication Sig Dispense Refill    losartan (COZAAR) 50 MG tablet Take 1 tablet by mouth daily 90 tablet 1    amLODIPine (NORVASC) 10 MG tablet Take 1 tablet by mouth daily 90 tablet 1    rosuvastatin (CRESTOR) 10 MG tablet Take 1 tablet by mouth daily 90 tablet 1    metFORMIN (GLUCOPHAGE) 500 MG tablet Take 1 tablet by mouth 2 times daily (with meals) 180 tablet 1    Blood Pressure KIT 1 kit by Does not apply route daily 1 kit 0    Blood Glucose Monitoring Suppl (ACCU-CHEK ERLIN PLUS) w/Device KIT 1 Device by Does not apply route daily 1 kit 0    blood glucose test strips (ASCENSIA AUTODISC VI;ONE TOUCH ULTRA TEST VI) strip Check glucose daily. DM 2 E11.9 100 each 3    Lancets MISC Check glucose daily. DM 2 E11.65. 100 each 1    hydrochlorothiazide (HYDRODIURIL) 25 MG tablet Take 1 tablet by mouth every morning (Patient not taking: Reported on 4/1/2020) 90 tablet 1    atorvastatin (LIPITOR) 20 MG tablet TAKE ONE TABLET BY MOUTH DAILY (Patient not taking: Reported on 4/1/2020) 90 tablet 1     No current facility-administered medications for this visit. There were no vitals filed for this visit.   Estimated body mass index is 51.66 kg/m² as

## 2020-04-04 ENCOUNTER — PATIENT MESSAGE (OUTPATIENT)
Dept: FAMILY MEDICINE CLINIC | Age: 45
End: 2020-04-04

## 2020-04-06 RX ORDER — ATENOLOL 50 MG/1
50 TABLET ORAL DAILY
Qty: 90 TABLET | Refills: 1 | Status: SHIPPED | OUTPATIENT
Start: 2020-04-06 | End: 2020-09-14

## 2020-04-07 ENCOUNTER — TELEPHONE (OUTPATIENT)
Dept: FAMILY MEDICINE CLINIC | Age: 45
End: 2020-04-07

## 2020-04-14 ENCOUNTER — TELEPHONE (OUTPATIENT)
Dept: FAMILY MEDICINE CLINIC | Age: 45
End: 2020-04-14

## 2020-04-14 NOTE — TELEPHONE ENCOUNTER
Pt called after hours line re high blood pressure. Reports his pressure 15 minutes ago was 191/110, HR 60s. He is completely asymptomatic: denies CP, SOB, palpitations, dizziness, HA, visual changes, diaphoresis, nausea, etc.    States he took Losartan 50 mg this am with atenolol 50 mg. His BP 2 hours later was 146/86. He takes his Norvasc 10 mg at 10 pm nightly. Due to low HR, will avoid second dose BB this evening, and he is on max dose Norvasc. I instructed him to take a second dose of losartan this evening and recheck BP 2 hours after. I also encouraged that he touch base with his PCP tomorrow to discuss long term game plan, with possible consideration for further work up for secondary HTN. He is agreeable to plan. He was instructed to call if he became symptomatic. Any CP/neuro deficit, he was told to go to the ER.

## 2020-04-15 ENCOUNTER — TELEMEDICINE (OUTPATIENT)
Dept: FAMILY MEDICINE CLINIC | Age: 45
End: 2020-04-15
Payer: COMMERCIAL

## 2020-04-15 ENCOUNTER — NURSE TRIAGE (OUTPATIENT)
Dept: OTHER | Facility: CLINIC | Age: 45
End: 2020-04-15

## 2020-04-15 ENCOUNTER — TELEPHONE (OUTPATIENT)
Dept: FAMILY MEDICINE CLINIC | Age: 45
End: 2020-04-15

## 2020-04-15 VITALS
SYSTOLIC BLOOD PRESSURE: 176 MMHG | HEART RATE: 72 BPM | DIASTOLIC BLOOD PRESSURE: 94 MMHG | BODY MASS INDEX: 51.66 KG/M2 | HEIGHT: 76 IN

## 2020-04-15 PROCEDURE — 99213 OFFICE O/P EST LOW 20 MIN: CPT | Performed by: PHYSICIAN ASSISTANT

## 2020-04-15 ASSESSMENT — ENCOUNTER SYMPTOMS
COUGH: 0
NAUSEA: 0
SHORTNESS OF BREATH: 0
CONSTIPATION: 0
ABDOMINAL PAIN: 0
SORE THROAT: 0
VOMITING: 0
DIARRHEA: 0
RHINORRHEA: 0

## 2020-04-15 ASSESSMENT — PATIENT HEALTH QUESTIONNAIRE - PHQ9
2. FEELING DOWN, DEPRESSED OR HOPELESS: 0
1. LITTLE INTEREST OR PLEASURE IN DOING THINGS: 0
SUM OF ALL RESPONSES TO PHQ QUESTIONS 1-9: 0
SUM OF ALL RESPONSES TO PHQ9 QUESTIONS 1 & 2: 0
SUM OF ALL RESPONSES TO PHQ QUESTIONS 1-9: 0

## 2020-04-15 NOTE — PROGRESS NOTES
4/15/2020    TELEHEALTH EVALUATION -- Audio/Visual (During IUWOD-02 public health emergency)    HPI:    Mookie Crawford (:  1975) has requested an audio/video evaluation for the following concern(s):    Remains with persistently elevated pressures. Pt currently on 3 BP medications, losartan, amlodipine, and atenolol. Max dose losartan and amlodipine with lower dose atenolol 2/2 to HR in the 60s. Pt is uncertain if his BP has been running this high, has not been checking regularly until the last couple of weeks. Is asymptomatic with elevated Bps including Ha, cp, vision changes, soa, le swelling. Historically has been noncompliant with follow up appointments and lab work. Low carb diet, no current exercise regimen. Last documented weight 424lbs with BMI of 51.66. States he was diagnosed with sleep apnea ~20 years ago but was noncompliant with cpap. Review of Systems   Constitutional: Negative for activity change, chills and fever. HENT: Negative for congestion, ear pain, rhinorrhea and sore throat. Eyes: Negative for visual disturbance. Respiratory: Negative for cough and shortness of breath. Cardiovascular: Negative for chest pain and palpitations. Gastrointestinal: Negative for abdominal pain, constipation, diarrhea, nausea and vomiting. Genitourinary: Negative for difficulty urinating and dysuria. Musculoskeletal: Negative for arthralgias and myalgias. Skin: Negative for rash. Neurological: Negative for dizziness, weakness and numbness. Psychiatric/Behavioral: Negative for sleep disturbance. Prior to Visit Medications    Medication Sig Taking?  Authorizing Provider   atenolol (TENORMIN) 50 MG tablet Take 1 tablet by mouth daily Yes JACKIE Leone   amLODIPine (NORVASC) 10 MG tablet Take 1 tablet by mouth daily Yes JACKIE Leone   rosuvastatin (CRESTOR) 10 MG tablet Take 1 tablet by mouth daily Yes JACKIE Leone   metFORMIN (GLUCOPHAGE) 500 MG tablet Take 1 tablet by mouth 2 times daily (with meals) Yes JACKIE Hardin   Blood Pressure KIT 1 kit by Does not apply route daily Yes Manoj Kumar MD   Blood Glucose Monitoring Suppl (ACCU-CHEK ERLIN PLUS) w/Device KIT 1 Device by Does not apply route daily Yes JACKIE Hardin   blood glucose test strips (ASCENSIA AUTODISC VI;ONE TOUCH ULTRA TEST VI) strip Check glucose daily. DM 2 E11.9 Yes JACKIE Hardin   Lancets MISC Check glucose daily. DM 2 E11.65. Yes JACKIE Hardin   hydrochlorothiazide (HYDRODIURIL) 25 MG tablet Take 1 tablet by mouth every morning  Patient not taking: Reported on 4/1/2020  JACKIE Hardin   atorvastatin (LIPITOR) 20 MG tablet TAKE ONE TABLET BY MOUTH DAILY  Patient not taking: Reported on 4/1/2020  JACKIE Hardin       Social History     Tobacco Use    Smoking status: Never Smoker    Smokeless tobacco: Former User   Substance Use Topics    Alcohol use: Yes     Alcohol/week: 1.0 standard drinks     Types: 1 Shots of liquor per week     Comment: occasional    Drug use: No        No Known Allergies,   Past Surgical History:   Procedure Laterality Date    KNEE ARTHROSCOPY Right     WISDOM TOOTH EXTRACTION Bilateral        PHYSICAL EXAMINATION:  [ INSTRUCTIONS:  \"[x]\" Indicates a positive item  \"[]\" Indicates a negative item  -- DELETE ALL ITEMS NOT EXAMINED]  Vital Signs: (As obtained by patient/caregiver or practitioner observation)    Blood pressure-  Heart rate-    Respiratory rate-    Temperature-  Pulse oximetry-     Constitutional: [x] Appears well-developed and well-nourished [] No apparent distress      [] Abnormal-   Mental status  [x] Alert and awake  [] Oriented to person/place/time []Able to follow commands      Eyes:  EOM    [x]  Normal  [] Abnormal-  Sclera  [x]  Normal  [] Abnormal -         Discharge []  None visible  [] Abnormal -    HENT:   [x] Normocephalic, atraumatic.   [] Abnormal   [x] Mouth/Throat: Mucous membranes are moist.     External Ears [x] Normal (During PEDCU-69 public health emergency), evaluation of the following organ systems was limited: Vitals/Constitutional/EENT/Resp/CV/GI//MS/Neuro/Skin/Heme-Lymph-Imm. Pursuant to the emergency declaration under the [address] [place] [address], [address] [place] [address] authority and the [employer] Act, this Virtual Visit was conducted with patient's (and/or legal guardian's) consent, to reduce the patient's risk of exposure to COVID-19 and provide necessary medical care. The patient (and/or legal guardian) has also been advised to contact this office for worsening conditions or problems, and seek emergency medical treatment and/or call 911 if deemed necessary. Services were provided through a video synchronous discussion virtually to substitute for in-person clinic visit. Patient and provider were located at their individual homes. --JACKIE Kelly on 4/15/2020 at 4:00 PM    An electronic signature was used to authenticate this note.

## 2020-04-20 ENCOUNTER — TELEPHONE (OUTPATIENT)
Dept: FAMILY MEDICINE CLINIC | Age: 45
End: 2020-04-20

## 2020-04-20 ENCOUNTER — NURSE ONLY (OUTPATIENT)
Dept: FAMILY MEDICINE CLINIC | Age: 45
End: 2020-04-20

## 2020-04-20 VITALS — DIASTOLIC BLOOD PRESSURE: 84 MMHG | SYSTOLIC BLOOD PRESSURE: 145 MMHG

## 2020-04-20 DIAGNOSIS — E11.65 TYPE 2 DIABETES MELLITUS WITH HYPERGLYCEMIA, WITHOUT LONG-TERM CURRENT USE OF INSULIN (HCC): ICD-10-CM

## 2020-04-20 DIAGNOSIS — I1A.0 RESISTANT HYPERTENSION: ICD-10-CM

## 2020-04-20 DIAGNOSIS — E78.5 HYPERLIPIDEMIA, UNSPECIFIED HYPERLIPIDEMIA TYPE: ICD-10-CM

## 2020-04-20 LAB
A/G RATIO: 1.7 (ref 1.1–2.2)
ALBUMIN SERPL-MCNC: 4.5 G/DL (ref 3.4–5)
ALP BLD-CCNC: 58 U/L (ref 40–129)
ALT SERPL-CCNC: 51 U/L (ref 10–40)
ANION GAP SERPL CALCULATED.3IONS-SCNC: 14 MMOL/L (ref 3–16)
AST SERPL-CCNC: 29 U/L (ref 15–37)
BILIRUB SERPL-MCNC: 0.6 MG/DL (ref 0–1)
BUN BLDV-MCNC: 12 MG/DL (ref 7–20)
CALCIUM SERPL-MCNC: 8.8 MG/DL (ref 8.3–10.6)
CHLORIDE BLD-SCNC: 101 MMOL/L (ref 99–110)
CHOLESTEROL, TOTAL: 191 MG/DL (ref 0–199)
CO2: 29 MMOL/L (ref 21–32)
CORTISOL - AM: 6.7 UG/DL (ref 4.3–22.4)
CREAT SERPL-MCNC: 0.6 MG/DL (ref 0.9–1.3)
GFR AFRICAN AMERICAN: >60
GFR NON-AFRICAN AMERICAN: >60
GLOBULIN: 2.7 G/DL
GLUCOSE BLD-MCNC: 127 MG/DL (ref 70–99)
HCT VFR BLD CALC: 46.5 % (ref 40.5–52.5)
HDLC SERPL-MCNC: 30 MG/DL (ref 40–60)
HEMOGLOBIN: 15.5 G/DL (ref 13.5–17.5)
IRON: 85 UG/DL (ref 59–158)
LDL CHOLESTEROL CALCULATED: 138 MG/DL
MCH RBC QN AUTO: 28.4 PG (ref 26–34)
MCHC RBC AUTO-ENTMCNC: 33.4 G/DL (ref 31–36)
MCV RBC AUTO: 85.2 FL (ref 80–100)
PDW BLD-RTO: 14.2 % (ref 12.4–15.4)
PLATELET # BLD: 303 K/UL (ref 135–450)
PMV BLD AUTO: 6.8 FL (ref 5–10.5)
POTASSIUM SERPL-SCNC: 3.8 MMOL/L (ref 3.5–5.1)
RBC # BLD: 5.46 M/UL (ref 4.2–5.9)
SODIUM BLD-SCNC: 144 MMOL/L (ref 136–145)
TOTAL PROTEIN: 7.2 G/DL (ref 6.4–8.2)
TRIGL SERPL-MCNC: 117 MG/DL (ref 0–150)
VLDLC SERPL CALC-MCNC: 23 MG/DL
WBC # BLD: 5.9 K/UL (ref 4–11)

## 2020-04-21 LAB
ESTIMATED AVERAGE GLUCOSE: 185.8 MG/DL
HBA1C MFR BLD: 8.1 %

## 2020-05-06 ENCOUNTER — PATIENT MESSAGE (OUTPATIENT)
Dept: FAMILY MEDICINE CLINIC | Age: 45
End: 2020-05-06

## 2020-05-06 RX ORDER — LOSARTAN POTASSIUM 50 MG/1
50 TABLET ORAL 2 TIMES DAILY
Qty: 180 TABLET | Refills: 1 | Status: SHIPPED | OUTPATIENT
Start: 2020-05-06 | End: 2020-11-25

## 2020-05-06 NOTE — TELEPHONE ENCOUNTER
From: Madison Azul  To: JACKIE Vail  Sent: 5/6/2020 9:48 AM EDT  Subject: Prescription Question    With me taking losartan 2 times a day can you adjust the prescription so I dont run out please

## 2020-05-08 ENCOUNTER — TELEPHONE (OUTPATIENT)
Dept: PULMONOLOGY | Age: 45
End: 2020-05-08

## 2020-05-08 NOTE — TELEPHONE ENCOUNTER
the terms described in the Terms of Service and this Telehealth Consent. The patient was read the following statement and has consented to the visit as of 5/8/20. The patient has been scheduled for their first telehealth visit on 5/15/20 with Katy Hinds CNP.

## 2020-05-15 ENCOUNTER — VIRTUAL VISIT (OUTPATIENT)
Dept: PULMONOLOGY | Age: 45
End: 2020-05-15
Payer: COMMERCIAL

## 2020-05-15 VITALS — HEIGHT: 76 IN | BODY MASS INDEX: 51.66 KG/M2

## 2020-05-15 PROCEDURE — G8427 DOCREV CUR MEDS BY ELIG CLIN: HCPCS | Performed by: NURSE PRACTITIONER

## 2020-05-15 PROCEDURE — 99203 OFFICE O/P NEW LOW 30 MIN: CPT | Performed by: NURSE PRACTITIONER

## 2020-05-15 ASSESSMENT — SLEEP AND FATIGUE QUESTIONNAIRES
HOW LIKELY ARE YOU TO NOD OFF OR FALL ASLEEP WHILE WATCHING TV: 0
HOW LIKELY ARE YOU TO NOD OFF OR FALL ASLEEP WHILE LYING DOWN TO REST IN THE AFTERNOON WHEN CIRCUMSTANCES PERMIT: 1
HOW LIKELY ARE YOU TO NOD OFF OR FALL ASLEEP WHILE SITTING AND TALKING TO SOMEONE: 0
HOW LIKELY ARE YOU TO NOD OFF OR FALL ASLEEP WHEN YOU ARE A PASSENGER IN A CAR FOR AN HOUR WITHOUT A BREAK: 0
ESS TOTAL SCORE: 1
HOW LIKELY ARE YOU TO NOD OFF OR FALL ASLEEP WHILE SITTING AND READING: 0
HOW LIKELY ARE YOU TO NOD OFF OR FALL ASLEEP IN A CAR, WHILE STOPPED FOR A FEW MINUTES IN TRAFFIC: 0
HOW LIKELY ARE YOU TO NOD OFF OR FALL ASLEEP WHILE SITTING INACTIVE IN A PUBLIC PLACE: 0
HOW LIKELY ARE YOU TO NOD OFF OR FALL ASLEEP WHILE SITTING QUIETLY AFTER LUNCH WITHOUT ALCOHOL: 0

## 2020-05-15 NOTE — PROGRESS NOTES
Genitourinary: Negative for hematuria   Musculoskeletal: Negative forarthralgias   Skin: Negative for rash  Neurological: Negative for syncope  Hematological: Negative for adenopathy  Psychiatric/Behavorial: Negative for anxiety      Objective:   PHYSICAL EXAM:  Ht 6' 4\" (1.93 m)   BMI 51.66 kg/m²     Physical Exam    Exam:  Gen: No acute distress, does not appear to be in pain. Resp:No visualized signs of difficulty breathing or respiratory distress, speaking in full sentences  Neuro: Awake. Alert. Psych: Oriented x 3. No anxiety. DATA:       Assessment:       ·  History of sleep apnea per patient diagnosed 10-15 yrs ago, no records, no current treatment, states previously on CPAP 12 cm H2O  · Snoring  · Observed sleep apnea   · Fatigue  · Obesity  · Uncontrolled HTN        Plan:      · HST to evaluate forsleep related breathing disorder. · Treatment options were discussed with patient if HST reveals ALFIE, including CPAP/APAP therapy, oral appliances and upper airway surgery. Patient is in favor of auto CPAP if HST is positive for ALFIE  Trial auto CPAP 10-16 CM H2O if HST is positive for obstructive sleep apnea  · Sleep hygiene  · Avoid sedatives, alcohol and caffeinated drinks at bed time. · No driving motorized vehicles or operating heavy machinery while fatigue, drowsy or sleepy. · Weight loss is also recommended as a long-term intervention. · Complications of ALFIE if not treated were discussed with patient patient to include systemic hypertension, pulmonary hypertension, cardiovascular morbidities, car accidents and all cause mortality. Discussed pathophysiology of ALFIE with patient today. Patient education regarding sleep tips      Consent for telehealth visit was obtained and is noted in chart      THIS VISIT WAS COMPLETED VIRTUALLY USING DOXY. Jacklyn Iqbal is a 40 y.o. male being evaluated by a Virtual Visit (video visit) encounter to address concerns as mentioned above.   A caregiver

## 2020-05-15 NOTE — PATIENT INSTRUCTIONS
Here are some tips to to getting better sleep  1- Avoid napping during the day: This will ensure you are tired at bedtime. If you have to take a nap, sleep less than one hour, before 3 pm.   2- Exercise regularly, but not right before bed: but the timing of the workout is important. Exercising in the morning or early afternoon will not interfere with sleep. Exercising within two hours before bedtime can decrease your ability to fall asleep. Regular exercise is recommended to help you deepen the sleep. 3- Avoid heavy, spicy, or sugary foods 4-6 hours before bedtime: These can affect your ability to stay asleep. 4- Have a light snack before bed: Having an empty stomach can interfere with your sleep. Dairy products and turkey contain tryptophan, which acts as a natural sleep inducer. 5- Stay away from caffeine, nicotine and alcohol at least 4-6 hours before bed: Caffeine and nicotine are stimulants that interfere with your ability to fall asleep. While alcohol has an immediate sleep-inducing effect, a few hours later, as alcohol levels in your blood start to fall, there is a stimulant effect and you will experience fragmented sleep. 6- Take a hot bath 90 minutes before bedtime:  A hot bath will raise your body temperature, but it is the drop in body temperature that may leave you feeling sleepy  7- Develop sleep rituals: it is important to give your body cues that it is time to slow down and sleep. Listen to relaxing music, read something soothing for 15 minutes, have a cup of caffeine free tea, or do relaxation exercises such as yoga or deep breathing help relieve anxiety and reduce muscle tension. 8- Fix a bedtime and an awakening time: Even on weekends! When your sleep cycle has a regular rhythm, you will feel better. 9- Sleep only when sleepy: This reduces the time you are awake in bed.    10- Get into your favorite sleeping position: If you can't fall asleep within 15-30 minutes, get up and do something boring until you feel sleepy. Sit quietly in the dark or read the warranty on your refrigerator. Don't expose yourself to bright light while you are up, it gives cues to your brain that it is time to wake up. 11- Only use your bed for sleeping: Dont use the bed as an office, workroom or recreation room. Let your body \"know\" that the bed is associated with sleeping  12- Use comfortable bedding. Uncomfortable bedding can prevent good sleep. Evaluate whether or not this is a source of your problem, and make appropriate changes. 13- Make sure your bed and bedroom are quiet and comfortable: A hot room can be uncomfortable. A cooler room, along with enough blankets to stay warm is recommended. Get a blackout shade or wear a slumber mask and wear earplugs or get a \"white noise\" machine for light and noise distractions. 14- Use sunlight to set your biological clock: When you get up in the morning, go outside and turn your face to the sun for 15 minutes. 13- Dont take your worries to bed: Leave worries about job, school, daily life, etc., behind when you go to bed. Some people find it useful to assign a \"worry period\" during the evening or afternoon for these issues.

## 2020-06-09 ENCOUNTER — HOSPITAL ENCOUNTER (OUTPATIENT)
Dept: SLEEP CENTER | Age: 45
Discharge: HOME | End: 2020-06-11
Payer: COMMERCIAL

## 2020-06-09 PROCEDURE — 95806 SLEEP STUDY UNATT&RESP EFFT: CPT

## 2020-06-11 ENCOUNTER — TELEPHONE (OUTPATIENT)
Dept: PULMONOLOGY | Age: 45
End: 2020-06-11

## 2020-08-13 ENCOUNTER — VIRTUAL VISIT (OUTPATIENT)
Dept: PULMONOLOGY | Age: 45
End: 2020-08-13
Payer: COMMERCIAL

## 2020-08-13 ENCOUNTER — TELEPHONE (OUTPATIENT)
Dept: PULMONOLOGY | Age: 45
End: 2020-08-13

## 2020-08-13 PROBLEM — G47.33 SEVERE OBSTRUCTIVE SLEEP APNEA: Status: ACTIVE | Noted: 2020-08-13

## 2020-08-13 PROBLEM — E66.01 MORBID OBESITY WITH BMI OF 50.0-59.9, ADULT (HCC): Status: ACTIVE | Noted: 2020-08-13

## 2020-08-13 PROCEDURE — G8427 DOCREV CUR MEDS BY ELIG CLIN: HCPCS | Performed by: NURSE PRACTITIONER

## 2020-08-13 PROCEDURE — 99213 OFFICE O/P EST LOW 20 MIN: CPT | Performed by: NURSE PRACTITIONER

## 2020-08-13 ASSESSMENT — SLEEP AND FATIGUE QUESTIONNAIRES
HOW LIKELY ARE YOU TO NOD OFF OR FALL ASLEEP WHILE SITTING AND TALKING TO SOMEONE: 0
ESS TOTAL SCORE: 0
HOW LIKELY ARE YOU TO NOD OFF OR FALL ASLEEP WHEN YOU ARE A PASSENGER IN A CAR FOR AN HOUR WITHOUT A BREAK: 0
HOW LIKELY ARE YOU TO NOD OFF OR FALL ASLEEP WHILE SITTING INACTIVE IN A PUBLIC PLACE: 0
HOW LIKELY ARE YOU TO NOD OFF OR FALL ASLEEP IN A CAR, WHILE STOPPED FOR A FEW MINUTES IN TRAFFIC: 0
HOW LIKELY ARE YOU TO NOD OFF OR FALL ASLEEP WHILE SITTING QUIETLY AFTER LUNCH WITHOUT ALCOHOL: 0
HOW LIKELY ARE YOU TO NOD OFF OR FALL ASLEEP WHILE LYING DOWN TO REST IN THE AFTERNOON WHEN CIRCUMSTANCES PERMIT: 0
HOW LIKELY ARE YOU TO NOD OFF OR FALL ASLEEP WHILE WATCHING TV: 0
HOW LIKELY ARE YOU TO NOD OFF OR FALL ASLEEP WHILE SITTING AND READING: 0

## 2020-08-13 NOTE — PROGRESS NOTES
Patient ID: Tammie Malik is a 40 y.o. male who is being seen today for   Chief Complaint   Patient presents with    Sleep Apnea     31-90     Referring: Michelle Cueva PA-C    HPI:     Tammie Malik is a 40 y.o. male for televideo appointment via video and audio doxy. me virtual visit for ALFIE follow up. HST was reviewed by me and noted below. It showed severe ALFIE and patient started auto CPAP after testing. Results were dicussed with patient and multiple good questions were answered. States he started using CPAP did take a few weeks off due to work. States he has now restarted. States he is doing okay with CPAP. Patient is using CPAP 5-7 hrs/night. Using humidifier. No snoring on CPAP. The pressure is well tolerated. The mask is comfortable-nasal . No mask leak. No significant daytime sleepiness. No nodding off when driving. No dry nose or throat. No fatigue. Bedtime is 10 pm -MN and rise time is 4-5 am. Sleep onset is few minutes. Wakes up 1 times at night total. 0-1 nocturia. It takes few minutes to fall back a sleep. No naps during the day. No headache in am. No weight gain. 0-2 caffienated beverages during the day. Occasional.  alcohol. ESS is 0. Initial HPI 5/15/20  Tammie Malik is a 40 y.o. male in office for suspected sleep apnea evaluation. He is currently taking 3 medications for HTN.  was diagnosed with ALFIE 10-15 years ago. Used CPAP for awhile but moved and stopped using it. States he did okay with CPAP, really ha no issues with it. Patient reports snoring at night for the past 30 years. Worse in supine position. Wakes self snoring. Has witnessed apnea. Sometimes restorative sleep. Sometimes dry mouth upon awakening. Fatigue and tiredness during the day. Bedtime 10 pm and rise time is 5 am. It takes 5 minutes to fall asleep. 1 nocturia. Wakes up 1 times at night. It takes few minutes to fall back a sleep. Takes no nap during the day- no time.  No headache in am. No car wrecks or near wrecks because of the sleepiness. No nodding off while driving. No weight gain. No forgetfulness or decreased concentration. No nasal congestion at night. Drinks 1 caffinated beverages per day. Occasional alcohol. No restless feelings in legs at night. No teeth grinding. No nightmares. No sleep walking. No night time panic attacks. No narcotics. No drug abuse. No history of depression. No history of anxiety. No history of atrial fibrillation. No history of DM. +history of HTN. No history of ischemic heart disease. No history of stroke. ESS is 1. No smoking. No known FH for ALFIE, RLS or narcolepsy. Sleep Medicine 8/13/2020 5/15/2020   Sitting and reading 0 0   Watching TV 0 0   Sitting, inactive in a public place (e.g. a theatre or a meeting) 0 0   As a passenger in a car for an hour without a break 0 0   Lying down to rest in the afternoon when circumstances permit 0 1   Sitting and talking to someone 0 0   Sitting quietly after a lunch without alcohol 0 0   In a car, while stopped for a few minutes in traffic 0 0   Total score 0 1       Past Medical History:  Past Medical History:   Diagnosis Date    Acid reflux     Hypertension     Sleep apnea        Past Surgical History:        Procedure Laterality Date    KNEE ARTHROSCOPY Right     WISDOM TOOTH EXTRACTION Bilateral        Allergies:  has No Known Allergies. Social History:    TOBACCO:   reports that he has never smoked. He quit smokeless tobacco use about 10 years ago. ETOH:   reports current alcohol use of about 1.0 standard drinks of alcohol per week.     Family History:       Problem Relation Age of Onset    Stroke Maternal Grandfather        Current Medications:    Current Outpatient Medications:     losartan (COZAAR) 50 MG tablet, Take 1 tablet by mouth 2 times daily, Disp: 180 tablet, Rfl: 1    atenolol (TENORMIN) 50 MG tablet, Take 1 tablet by mouth daily, Disp: 90 tablet, Rfl: 1    amLODIPine (NORVASC) 10 MG tablet, Take 1 tablet by mouth daily, Disp: 90 tablet, Rfl: 1    rosuvastatin (CRESTOR) 10 MG tablet, Take 1 tablet by mouth daily, Disp: 90 tablet, Rfl: 1    metFORMIN (GLUCOPHAGE) 500 MG tablet, Take 1 tablet by mouth 2 times daily (with meals), Disp: 180 tablet, Rfl: 1    Blood Pressure KIT, 1 kit by Does not apply route daily, Disp: 1 kit, Rfl: 0    atorvastatin (LIPITOR) 20 MG tablet, TAKE ONE TABLET BY MOUTH DAILY, Disp: 90 tablet, Rfl: 1    Blood Glucose Monitoring Suppl (ACCU-CHEK ERLIN PLUS) w/Device KIT, 1 Device by Does not apply route daily, Disp: 1 kit, Rfl: 0    blood glucose test strips (ASCENSIA AUTODISC VI;ONE TOUCH ULTRA TEST VI) strip, Check glucose daily. DM 2 E11.9, Disp: 100 each, Rfl: 3    Lancets MISC, Check glucose daily. DM 2 E11.65., Disp: 100 each, Rfl: 1      REVIEW OF SYSTEMS:  Review of Systems        Objective:   PHYSICAL EXAM:  There were no vitals taken for this visit. Physical Exam  Exam:  Gen: No acute distress, does not appear to be in pain. Appears well developed and nourished. HENT: Head is normocephalic and atraumatic. Normal appearing nose. External Ears normal.   Neck: No visualized mass. Trachea is midline   Eyes: EOM intact. No visible discharge. Resp:No visualized signs of difficulty breathing or respiratory distress, speaking in full sentences. Respiratory effort normal.  Neuro: Awake. Alert. Able to follow commands. No facial asymmetry. Psych: Oriented x 3. No anxiety. Normal affect. DATA:   6/9/2020 HST AHI 42.7, low SPO2 66%, under 89% for 137 minutes. Started auto CPAP 10-16 cm H2O    CPAP compliance data:  Compliance download report from 7/7/20 to 8/5/20 reviewed today by me and showed patient is using machine 5:26 hrs/night with 40% compliance and AHI 1.3 within this time frame. 12/30days with greater than 4 hours of machine use. 90% pressure 13.5 cm H20 on auto CPAP 10-16 cm H2O    Assessment:       · Severe ALFIE. Auto CPAP 10-16 cm H2O.   Suboptimal compliance on review today  · Snoring-resolved on CPAP  · Observed sleep apnea -resolved on CPAP  · Fatigue-improved  · Obesity  · Uncontrolled HTN        Plan:      Continue auto CPAP 10-16 cm H2O  Discussed severity of sleep apnea and importance of CPAP use  Recommend at least 7, preferably 8 hours of sleep nightly with CPAP  Advised to use CPAP 6-8 hrs at night and during naps. Replacement of mask, tubing, head straps every 3-6 months or sooner if damaged. Patient instructed to contact DME company for any mask, tubing or machine trouble shooting if problems arise. · Sleep hygiene  · Avoid sedatives, alcohol and caffeinated drinks at bed time. · No driving motorized vehicles or operating heavy machinery while fatigue, drowsy or sleepy. · Weight loss is also recommended as a long-term intervention. · Complications of ALFIE if not treated were discussed with patient patient to include systemic hypertension, pulmonary hypertension, cardiovascular morbidities, car accidents and all cause mortality. Discussed pathophysiology of ALFIE with patient today. Patient education regarding sleep tips      Consent for telehealth visit was obtained and is noted in chart      THIS VISIT WAS COMPLETED VIRTUALLY USING DOXY. Azalia Kawasaki is a 40 y.o. male being evaluated by a Virtual Visit (video visit) encounter to address concerns as mentioned above. A caregiver was present when appropriate. Due to this being a TeleHealth encounter (During ADZEG-68 public health emergency), evaluation of the following organ systems was limited: Vitals/Constitutional/EENT/Resp/CV/GI//MS/Neuro/Skin/Heme-Lymph-Imm.   Pursuant to the emergency declaration under the [facility], [address] [place] [address] authority and the [employer] Act, this Virtual Visit was conducted with patient's (and/or legal guardian's) consent, to reduce the patient's risk of exposure to COVID-19 and provide necessary medical care. The patient (and/or legal guardian) has also been advised to contact this office for worsening conditions or problems, and seek emergency medical treatment and/or call 911 if deemed necessary. Patient identification was verified at the start of the visit: Yes    Total time spent for this encounter: Not billed by time    Services were provided through a video synchronous discussion virtually to substitute for in-person clinic visit. Patient and provider were located at their individual homes. --MICHELLE Oh CNP on 8/13/2020 at 1:39 PM    An electronic signature was used to authenticate this note.

## 2020-08-13 NOTE — PATIENT INSTRUCTIONS
.Please keep all of your future appointments scheduled by [address], [facility] office. Out of respect for other patients and providers, you may be asked to reschedule your appointment if you arrive later than your scheduled appointment time. Appointments cancelled less than 24hrs in advance will be considered a no show. Patients with three missed appointments within 1 year or four missed appointments within 2 years can be dismissed from the practice. You may receive a survey regarding the care you received during your visit. Your input is valuable to us. We encourage you to complete and return your survey. We hope you will choose us in the future for your healthcare needs. Remember to bring your sleep machine, cord and mask to each appointment    You should have a follow up appointment scheduled with a sleep medicine provider within 12 months. Routine parts, masks, tubing and filters should all be obtainable from your DME (equipment) provider. Any problems related to mask fit, comfort or functioning of equipment should also be addressed directly with your DME provider. If you are unable to resolve problems, then please notify our office and schedule an appointment to be seen sooner than the usual follow up appointment. For all patients who are evaluated for sleep disorders, never drive or operate motorized equipment while sleepy, fatigued or groggy. Please bring in all of your sleep equipment to all of your appointments, including machine, mask, cords and hoses. Here are some tips to to getting better sleep  1- Avoid napping during the day: This will ensure you are tired at bedtime. If you have to take a nap, sleep less than one hour, before 3 pm.   2- Exercise regularly, but not right before bed: but the timing of the workout is important. Exercising in the morning or early afternoon will not interfere with sleep.   Exercising within two hours before bedtime can decrease your ability to fall asleep. Regular exercise is recommended to help you deepen the sleep. 3- Avoid heavy, spicy, or sugary foods 4-6 hours before bedtime: These can affect your ability to stay asleep. 4- Have a light snack before bed: Having an empty stomach can interfere with your sleep. Dairy products and turkey contain tryptophan, which acts as a natural sleep inducer. 5- Stay away from caffeine, nicotine and alcohol at least 4-6 hours before bed: Caffeine and nicotine are stimulants that interfere with your ability to fall asleep. While alcohol has an immediate sleep-inducing effect, a few hours later, as alcohol levels in your blood start to fall, there is a stimulant effect and you will experience fragmented sleep. 6- Take a hot bath 90 minutes before bedtime:  A hot bath will raise your body temperature, but it is the drop in body temperature that may leave you feeling sleepy  7- Develop sleep rituals: it is important to give your body cues that it is time to slow down and sleep. Listen to relaxing music, read something soothing for 15 minutes, have a cup of caffeine free tea, or do relaxation exercises such as yoga or deep breathing help relieve anxiety and reduce muscle tension. 8- Fix a bedtime and an awakening time: Even on weekends! When your sleep cycle has a regular rhythm, you will feel better. 9- Sleep only when sleepy: This reduces the time you are awake in bed. 10- Get into your favorite sleeping position: If you can't fall asleep within 15-30 minutes, get up and do something boring until you feel sleepy. Sit quietly in the dark or read the warranty on your refrigerator. Don't expose yourself to bright light while you are up, it gives cues to your brain that it is time to wake up. 11- Only use your bed for sleeping: Dont use the bed as an office, workroom or recreation room. Let your body \"know\" that the bed is associated with sleeping  12- Use comfortable bedding.  Uncomfortable bedding can prevent good sleep. Evaluate whether or not this is a source of your problem, and make appropriate changes. 13- Make sure your bed and bedroom are quiet and comfortable: A hot room can be uncomfortable. A cooler room, along with enough blankets to stay warm is recommended. Get a blackout shade or wear a slumber mask and wear earplugs or get a \"white noise\" machine for light and noise distractions. 14- Use sunlight to set your biological clock: When you get up in the morning, go outside and turn your face to the sun for 15 minutes. 13- Dont take your worries to bed: Leave worries about job, school, daily life, etc., behind when you go to bed. Some people find it useful to assign a \"worry period\" during the evening or afternoon for these issues. CPAP Equipment Cleaning and Disinfecting Schedule  Equipment Cleaning Frequency Instructions  Disinfecting Frequency   Non-Disposable Filters  Weekly Mild soapy water, Rinse, Air Dry Not Required   Disposable Filters Change as needed  2-4 weeks Do Not Wash Not Required   Hose/tubing Daily Mild soapy water, Rinse, Air Dry Once a week   Mask / Nasal Pillows Daily Mild soapy water, Rinse, Air Dry Once a week   Headgear Weekly Hand wash, Mild soapy water, Rinse, Dry  Not Required   Humidifier Daily Empty water daily  Mild soapy water, Rinse well, Air Dry  Once a week   CPAP Unit As Needed Dust with damp cloth,  No detergents or sprays Not Required         Disinfect (per schedule) with 1 part white vinegar and 3 parts water- soak mask and water chamber for 30 minutes every 1-2 weeks, more often if sick. Allow water/vinegar mixture to run through tubing. Allow all equipment to air dry. Drying Hints:   Always hang tubing away from direct sunlight, as this will cause the tubing to become yellow, brittle and crack over a period of time. DO NOT attach the wet tubing to your CPAP unit to blow-dry it. The moisture from the tubing can drain back into your machine. Moisture in your unit can cause sudden pressure increases or short circuits  DO's and DON'Ts:  - Don't use alcohol-based products to clean your mask, because it can cause the materials to become hard and brittle. - Don't put headgear in the washer or dryer  - Don't use any caustic or household cleaning solutions such as bleach on your CPAP   equipment.  - Do follow the recommended cleaning schedule. - Do change your disposable filter frequently. Adapted From: [email or web address]. shtm.   These are general suggestions for all models please follow specific s recommendations and specific instructions

## 2020-08-13 NOTE — TELEPHONE ENCOUNTER
Left voice message requesting patient to please return call to office. To schedule a 6 week F/U appt.

## 2020-09-22 ENCOUNTER — TELEPHONE (OUTPATIENT)
Dept: PULMONOLOGY | Age: 45
End: 2020-09-22

## 2020-09-22 NOTE — TELEPHONE ENCOUNTER
Patient did not show for 6 week sleep appointment  with Valerie Paul CNP on 9/22/20    Same Day Cancellation: Yes    Patient rescheduled:  Yes    New appointment: 10/27/20@ 340p    Patient was also no show on: N/A    LOV 8/13/20  Assessment:       · Severe ALFIE. Auto CPAP 10-16 cm H2O. Suboptimal compliance on review today  · Snoring-resolved on CPAP  · Observed sleep apnea -resolved on CPAP  · Fatigue-improved  · Obesity  · Uncontrolled HTN         Plan:      · Continue auto CPAP 10-16 cm H2O  · Discussed severity of sleep apnea and importance of CPAP use  · Recommend at least 7, preferably 8 hours of sleep nightly with CPAP  · Advised to use CPAP 6-8 hrs at night and during naps. · Replacement of mask, tubing, head straps every 3-6 months or sooner if damaged. · Patient instructed to contact DME company for any mask, tubing or machine trouble shooting if problems arise. · Sleep hygiene  · Avoid sedatives, alcohol and caffeinated drinks at bed time. · No driving motorized vehicles or operating heavy machinery while fatigue, drowsy or sleepy. · Weight loss is also recommended as a long-term intervention. · Complications of ALFIE if not treated were discussed with patient patient to include systemic hypertension, pulmonary hypertension, cardiovascular morbidities, car accidents and all cause mortality. · Discussed pathophysiology of ALFIE with patient today.   · Patient education regarding sleep tips

## 2020-10-27 ENCOUNTER — TELEPHONE (OUTPATIENT)
Dept: PULMONOLOGY | Age: 45
End: 2020-10-27

## 2020-10-28 NOTE — TELEPHONE ENCOUNTER
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 He is not sure when he will be able to r/s.

## 2020-10-29 NOTE — TELEPHONE ENCOUNTER
Patient did not keep follow up appointment as was recommended. Please schedule a follow up visit for this patient when he calls requesting such appointment.

## 2020-12-28 DIAGNOSIS — I10 ESSENTIAL HYPERTENSION: ICD-10-CM

## 2020-12-28 NOTE — TELEPHONE ENCOUNTER
Refill Request     Last Seen: 4/15/2020    Last Written: 4/1/2020    Next Appointment:   Future Appointments   Date Time Provider Yao Halie   1/8/2021  4:40 PM Author JACKIE Chong [facility]       Appointment scheduled      Requested Prescriptions     Pending Prescriptions Disp Refills    amLODIPine (NORVASC) 10 MG tablet 90 tablet 1     Sig: Take 1 tablet by mouth daily

## 2020-12-30 RX ORDER — AMLODIPINE BESYLATE 10 MG/1
10 TABLET ORAL DAILY
Qty: 90 TABLET | Refills: 0 | Status: SHIPPED | OUTPATIENT
Start: 2020-12-30 | End: 2021-04-12

## 2021-01-08 ENCOUNTER — TELEPHONE (OUTPATIENT)
Dept: FAMILY MEDICINE CLINIC | Age: 46
End: 2021-01-08

## 2021-02-26 ENCOUNTER — OFFICE VISIT (OUTPATIENT)
Dept: PRIMARY CARE CLINIC | Age: 46
End: 2021-02-26
Payer: COMMERCIAL

## 2021-02-26 ENCOUNTER — TELEPHONE (OUTPATIENT)
Dept: FAMILY MEDICINE CLINIC | Age: 46
End: 2021-02-26

## 2021-02-26 DIAGNOSIS — Z11.59 SCREENING FOR VIRAL DISEASE: Primary | ICD-10-CM

## 2021-02-26 PROCEDURE — 99211 OFF/OP EST MAY X REQ PHY/QHP: CPT | Performed by: NURSE PRACTITIONER

## 2021-02-26 RX ORDER — ATENOLOL 50 MG/1
TABLET ORAL
Qty: 30 TABLET | Refills: 0 | Status: SHIPPED | OUTPATIENT
Start: 2021-02-26 | End: 2021-03-12

## 2021-02-26 RX ORDER — ATENOLOL 50 MG/1
TABLET ORAL
Qty: 10 TABLET | Refills: 0 | Status: CANCELLED | OUTPATIENT
Start: 2021-02-26

## 2021-02-26 NOTE — TELEPHONE ENCOUNTER
----- Message from Allen Almaandres sent at 2/26/2021 10:02 AM EST -----  Subject: Appointment Request    Reason for Call: Routine Existing Condition Follow Up    QUESTIONS  Type of Appointment? Established Patient  Reason for appointment request? Available appointments did not meet   patient need  Additional Information for Provider? Pt stated that he needs a office   visit for a BP f/u. Pt screened red and was exposed to covid by son and is   getting tested today 2/26. Pt stated that he has to have a vv then he will   but he was told that his next visit needed to be in person. Pt also needs   appt for refill on meds  ---------------------------------------------------------------------------  --------------  CALL BACK INFO  What is the best way for the office to contact you? OK to leave message on   voicemail  Preferred Call Back Phone Number? [phone]  ---------------------------------------------------------------------------  --------------  SCRIPT ANSWERS  Relationship to Patient? Self  Appointment reason? Well Care/Follow Ups  Select a Well Care/Follow Ups appointment reason? Adult Existing Condition   Follow Up [Diabetes   CHF   COPD   Hypertension/Blood Pressure Check]  (Is the patient requesting to be seen urgently for their symptoms?)? No  Is this follow up request related to routine Diabetes Management? No  Are you having any new concerns about your existing condition? No  Have you been diagnosed with   tested for   or told that you are suspected of having COVID-19 (Coronavirus)? Yes  Did your symptoms begin or have you been tested for COVID-19 in the last   10 days?  Yes

## 2021-02-26 NOTE — TELEPHONE ENCOUNTER
Refill Request     Last Seen: 4/15/2020    Last Written: 02/12/2021 #10 with 0 refills     Next Appointment:   Future Appointments   Date Time Provider Yao Ambrose   2/26/2021 10:35 AM SCHEDULE, [facility]       Patient is due for an in office visit       Requested Prescriptions     Pending Prescriptions Disp Refills    atenolol (TENORMIN) 50 MG tablet 10 tablet 0

## 2021-02-26 NOTE — TELEPHONE ENCOUNTER
Refill Request     Last Seen: 4/15/2020    Last Written: 2/12/21- 10 tabs with 0 refill    Next Appointment:   Future Appointments   Date Time Provider Yao Ambrose   2/26/2021 10:35 AM SCHEDULE, [facility]   3/26/2021  3:20 PM JACKIE Pichardo [place]       Future appointment scheduled      Requested Prescriptions     Pending Prescriptions Disp Refills    atenolol (TENORMIN) 50 MG tablet 10 tablet 0     Sig: TAKE ONE TABLET BY MOUTH DAILY

## 2021-02-26 NOTE — PROGRESS NOTES
Blas Azul received a viral test for COVID-19. They were educated on isolation and quarantine as appropriate. For any symptoms, they were directed to seek care from their PCP, given contact information to establish with a doctor, directed to an urgent care or the emergency room.

## 2021-02-26 NOTE — PATIENT INSTRUCTIONS

## 2021-02-26 NOTE — TELEPHONE ENCOUNTER
----- Message from Lis Amicamden sent at 2/26/2021 10:03 AM EST -----  Subject: Refill Request    QUESTIONS  Name of Medication? atenolol (TENORMIN) 50 MG tablet  Patient-reported dosage and instructions? TAKE ONE TABLET BY MOUTH DAILY  How many days do you have left? 2  Preferred Pharmacy? [address]  phone number (if available)? [phone]  ---------------------------------------------------------------------------  --------------  Deanne CHERRY  What is the best way for the office to contact you? OK to leave message on   voicemail  Preferred Call Back Phone Number?  [phone]

## 2021-02-27 LAB — SARS-COV-2: DETECTED

## 2021-03-04 ENCOUNTER — VIRTUAL VISIT (OUTPATIENT)
Dept: FAMILY MEDICINE CLINIC | Age: 46
End: 2021-03-04
Payer: COMMERCIAL

## 2021-03-04 DIAGNOSIS — E11.9 TYPE 2 DIABETES MELLITUS WITHOUT COMPLICATION, WITHOUT LONG-TERM CURRENT USE OF INSULIN (HCC): ICD-10-CM

## 2021-03-04 DIAGNOSIS — I10 ESSENTIAL HYPERTENSION: ICD-10-CM

## 2021-03-04 DIAGNOSIS — U07.1 COVID-19: Primary | ICD-10-CM

## 2021-03-04 DIAGNOSIS — E78.00 PURE HYPERCHOLESTEROLEMIA: ICD-10-CM

## 2021-03-04 DIAGNOSIS — E66.01 MORBID OBESITY WITH BMI OF 50.0-59.9, ADULT (HCC): ICD-10-CM

## 2021-03-04 PROCEDURE — 99214 OFFICE O/P EST MOD 30 MIN: CPT | Performed by: PHYSICIAN ASSISTANT

## 2021-03-04 ASSESSMENT — PATIENT HEALTH QUESTIONNAIRE - PHQ9
2. FEELING DOWN, DEPRESSED OR HOPELESS: 0
SUM OF ALL RESPONSES TO PHQ QUESTIONS 1-9: 0
1. LITTLE INTEREST OR PLEASURE IN DOING THINGS: 0
SUM OF ALL RESPONSES TO PHQ QUESTIONS 1-9: 0

## 2021-03-04 NOTE — PROGRESS NOTES
3/4/2021    TELEHEALTH EVALUATION -- Audio/Visual (During JQPSY-88 public health emergency)    HPI:    Chet Gaspar (:  1975) has requested an audio/video evaluation for the following concern(s):    Recently diagnosed with covid 19. Denies respiratory sxs including cough, congestion. Does have loss of taste and smell. Is doing well with quarantine. Monitoring O2 sats. HTN: reports well controlled on amlodipine, losartan, and atenolol. Denies HA, cp, soa, le swelling. HLD: on atorvastatin 20 mg nightly. No current diet or exercise regimen. DM: On metformin 500 mg BID. Denies side effects. Does not routinely monitor BG. Denies lows. No log for review. On statin and arb. Over due for labs. Review of Systems   Constitutional: Negative for activity change, chills and fever. HENT: Negative for congestion, ear pain, rhinorrhea and sore throat. Eyes: Negative for visual disturbance. Respiratory: Negative for cough and shortness of breath. Cardiovascular: Negative for chest pain and palpitations. Gastrointestinal: Negative for abdominal pain, constipation, diarrhea, nausea and vomiting. Genitourinary: Negative for difficulty urinating and dysuria. Musculoskeletal: Negative for arthralgias and myalgias. Skin: Negative for rash. Neurological: Negative for dizziness, weakness and numbness. Psychiatric/Behavioral: Negative for sleep disturbance. Prior to Visit Medications    Medication Sig Taking?  Authorizing Provider   atenolol (TENORMIN) 50 MG tablet TAKE ONE TABLET BY MOUTH DAILY Yes JACKIE Hanks   amLODIPine (NORVASC) 10 MG tablet Take 1 tablet by mouth daily Yes JACKIE Hanks   losartan (COZAAR) 50 MG tablet TAKE ONE TABLET BY MOUTH TWICE A DAY Yes JACKIE Hanks   rosuvastatin (CRESTOR) 10 MG tablet Take 1 tablet by mouth daily Yes JACKIE Hanks   metFORMIN (GLUCOPHAGE) 500 MG tablet Take 1 tablet by mouth 2 times daily (with meals) Yes Nakita Beltran Asymmetry (Cranial nerve 7 motor function) (limited exam to video visit)          [x] No gaze palsy        [] Abnormal-         Skin:        [x] No significant exanthematous lesions or discoloration noted on facial skin         [] Abnormal-            Psychiatric:       [x] Normal Affect [x] No Hallucinations        [] Abnormal-     Other pertinent observable physical exam findings-     ASSESSMENT/PLAN:  1. COVID-19  - recommend pt start 81 mg asa  - continue to monitor O2 sats  - complete quarantine as instructed. 2. Essential hypertension  - overdue for labs. Patient to complete next month     3. Morbid obesity with BMI of 50.0-59.9, adult ([facility] [address]  - The patient is advised to begin progressive daily aerobic exercise program, follow a low fat, low cholesterol diet and attempt to lose weight. 4. Pure hypercholesterolemia  - on statin, due for labs  - encouraged low fat/low cholesterol diet and avoiding processed and prepackaged foods. 5. Type 2 diabetes mellitus without complication, without long-term current use of insulin ([facility] [address]  - needs a1c, order placed. Patient overdue for labs. Patient to have completed prior to appointment next month for prescription refills. Return in about 4 weeks (around 4/1/2021). Ava Otero, was evaluated through a synchronous (real-time) audio-video encounter. The patient (or guardian if applicable) is aware that this is a billable service. Verbal consent to proceed has been obtained within the past 12 months. The visit was conducted pursuant to the emergency declaration under the [address] [place] [address] authority and the [employer] General Act. Patient identification was verified, and a caregiver was present when appropriate. The patient was located in a state where the provider was credentialed to provide care.     Total time spent on this encounter: Not billed by time --JACKIE Stovall on 3/6/2021 at 6:36 PM    An electronic signature was used to authenticate this note.

## 2021-03-06 ASSESSMENT — ENCOUNTER SYMPTOMS
NAUSEA: 0
COUGH: 0
CONSTIPATION: 0
SHORTNESS OF BREATH: 0
SORE THROAT: 0
ABDOMINAL PAIN: 0
RHINORRHEA: 0
VOMITING: 0
DIARRHEA: 0

## 2021-03-12 RX ORDER — ATENOLOL 50 MG/1
TABLET ORAL
Qty: 30 TABLET | Refills: 0 | Status: SHIPPED | OUTPATIENT
Start: 2021-03-12 | End: 2021-04-12

## 2021-03-12 NOTE — TELEPHONE ENCOUNTER
Refill Request     Last Seen: 3/4/2021    Last Written: 02/26/2021 #30 with 1 refill     Next Appointment:   Future Appointments   Date Time Provider Yao Ambrose   4/5/2021  3:20 PM JACKIE Burger [facility]       Future appointment scheduled      Requested Prescriptions     Pending Prescriptions Disp Refills    atenolol (TENORMIN) 50 MG tablet [Pharmacy Med Name: ATENOLOL 50 MG TABLET] 30 tablet 0     Sig: TAKE ONE TABLET BY MOUTH DAILY

## 2021-04-11 DIAGNOSIS — I10 ESSENTIAL HYPERTENSION: ICD-10-CM

## 2021-04-12 RX ORDER — AMLODIPINE BESYLATE 10 MG/1
TABLET ORAL
Qty: 90 TABLET | Refills: 1 | Status: SHIPPED | OUTPATIENT
Start: 2021-04-12 | End: 2021-10-25

## 2021-04-12 RX ORDER — ATENOLOL 50 MG/1
TABLET ORAL
Qty: 90 TABLET | Refills: 1 | Status: SHIPPED | OUTPATIENT
Start: 2021-04-12 | End: 2021-05-10

## 2021-05-10 ENCOUNTER — OFFICE VISIT (OUTPATIENT)
Dept: FAMILY MEDICINE CLINIC | Age: 46
End: 2021-05-10
Payer: COMMERCIAL

## 2021-05-10 VITALS
SYSTOLIC BLOOD PRESSURE: 178 MMHG | HEIGHT: 73 IN | HEART RATE: 68 BPM | OXYGEN SATURATION: 97 % | BODY MASS INDEX: 41.75 KG/M2 | WEIGHT: 315 LBS | DIASTOLIC BLOOD PRESSURE: 100 MMHG | TEMPERATURE: 98.2 F

## 2021-05-10 DIAGNOSIS — I10 ESSENTIAL HYPERTENSION: ICD-10-CM

## 2021-05-10 DIAGNOSIS — E11.65 UNCONTROLLED TYPE 2 DIABETES MELLITUS WITH HYPERGLYCEMIA (HCC): Primary | ICD-10-CM

## 2021-05-10 LAB
CREATININE URINE POCT: 200
HBA1C MFR BLD: 8.8 %
MICROALBUMIN/CREAT 24H UR: 150 MG/G{CREAT}
MICROALBUMIN/CREAT UR-RTO: ABNORMAL

## 2021-05-10 PROCEDURE — 99214 OFFICE O/P EST MOD 30 MIN: CPT | Performed by: PHYSICIAN ASSISTANT

## 2021-05-10 PROCEDURE — 83036 HEMOGLOBIN GLYCOSYLATED A1C: CPT | Performed by: PHYSICIAN ASSISTANT

## 2021-05-10 PROCEDURE — 82044 UR ALBUMIN SEMIQUANTITATIVE: CPT | Performed by: PHYSICIAN ASSISTANT

## 2021-05-10 PROCEDURE — 3052F HG A1C>EQUAL 8.0%<EQUAL 9.0%: CPT | Performed by: PHYSICIAN ASSISTANT

## 2021-05-10 RX ORDER — METFORMIN HYDROCHLORIDE 750 MG/1
750 TABLET, EXTENDED RELEASE ORAL 2 TIMES DAILY WITH MEALS
Qty: 180 TABLET | Refills: 1 | Status: SHIPPED | OUTPATIENT
Start: 2021-05-10 | End: 2021-10-25

## 2021-05-10 RX ORDER — ATENOLOL 100 MG/1
100 TABLET ORAL DAILY
Qty: 90 TABLET | Refills: 1 | Status: SHIPPED | OUTPATIENT
Start: 2021-05-10 | End: 2021-10-25

## 2021-05-10 RX ORDER — LOSARTAN POTASSIUM 50 MG/1
TABLET ORAL
Qty: 180 TABLET | Refills: 1 | Status: SHIPPED | OUTPATIENT
Start: 2021-05-10 | End: 2021-08-30

## 2021-05-10 SDOH — ECONOMIC STABILITY: TRANSPORTATION INSECURITY
IN THE PAST 12 MONTHS, HAS THE LACK OF TRANSPORTATION KEPT YOU FROM MEDICAL APPOINTMENTS OR FROM GETTING MEDICATIONS?: NO

## 2021-05-10 ASSESSMENT — ENCOUNTER SYMPTOMS
COUGH: 0
DIARRHEA: 0
RHINORRHEA: 0
VOMITING: 0
NAUSEA: 0
ABDOMINAL PAIN: 0
CONSTIPATION: 0
SORE THROAT: 0
SHORTNESS OF BREATH: 0

## 2021-05-10 NOTE — PROGRESS NOTES
2021  Shanthi Azul (: 1975)  39 y.o. HPI    Routine follow up chronic conditions. HTN: remains uncontrolled, current regimen includes losartan 50 mg BID and atenolol 50 mg daily. Pt does not have accurate cuff at home, due to cuff size. Denies ha, cp, soa, le swelling. Has been on diuretic in the past but stopped 2/2 to side effects. Also with sleep apnea, no cpap/bipap. On statin and arb. DM: current regimen includes metformin 500 mg BID. Does not monitor BG regularly. Has tried keto in the past, not currently following low carb diet. HLD: on statin, due for labs. No current diet or exercise regimen. Review of Systems   Constitutional: Negative for activity change, chills and fever. HENT: Negative for congestion, ear pain, rhinorrhea and sore throat. Eyes: Negative for visual disturbance. Respiratory: Negative for cough and shortness of breath. Cardiovascular: Negative for chest pain and palpitations. Gastrointestinal: Negative for abdominal pain, constipation, diarrhea, nausea and vomiting. Genitourinary: Negative for difficulty urinating and dysuria. Musculoskeletal: Negative for arthralgias and myalgias. Skin: Negative for rash. Neurological: Negative for dizziness, weakness and numbness. Psychiatric/Behavioral: Negative for sleep disturbance. Allergies, past medical history, family history, and social history reviewed and unchanged from previous encounter.      Current Outpatient Medications   Medication Sig Dispense Refill    metFORMIN (GLUCOPHAGE-XR) 750 MG extended release tablet Take 1 tablet by mouth 2 times daily (with meals) 180 tablet 1    atenolol (TENORMIN) 100 MG tablet Take 1 tablet by mouth daily 90 tablet 1    losartan (COZAAR) 50 MG tablet TAKE ONE TABLET BY MOUTH TWICE A  tablet 1    amLODIPine (NORVASC) 10 MG tablet TAKE ONE TABLET BY MOUTH DAILY 90 tablet 1    rosuvastatin (CRESTOR) 10 MG tablet Take 1 tablet by mouth Deep Tendon Reflexes: Reflexes are normal and symmetric. Visual inspection:  Deformity/amputation: absent  Skin lesions/pre-ulcerative calluses: absent  Edema: right- negative, left- negative    Sensory exam:  Monofilament sensation: normal  (minimum of 5 random plantar locations tested, avoiding callused areas - > 1 area with absence of sensation is + for neuropathy)    Plus at least one of the following:  Pulses: normal,   Pinprick: Intact  Proprioception: N/A  Vibration (128 Hz): N/A      ASSESSMENT and PLAN:  Allen Bansal was seen today for hypertension. Diagnoses and all orders for this visit:    Uncontrolled type 2 diabetes mellitus with hyperglycemia (HCC)  -     POCT glycosylated hemoglobin (Hb A1C) 8.8  -     HM DIABETES FOOT EXAM  -     POCT microalbumin  -     metFORMIN (GLUCOPHAGE-XR) 750 MG extended release tablet; Take 1 tablet by mouth 2 times daily (with meals)  - increase metformin     Essential hypertension  -     atenolol (TENORMIN) 100 MG tablet; Take 1 tablet by mouth daily  -     losartan (COZAAR) 50 MG tablet; TAKE ONE TABLET BY MOUTH TWICE A DAY  - increase atenolol, Ideally would add hctz, but patient declines 2/2 to side effects. needs follow up with sleep medicine      Severe obstructive sleep apnea  - Previously following with sleep medicine, has no showed last 2 appointments. - likely playing component in resistant hypertension. Due for fasting labs. Pt to obtain prior to next appointment. Return in about 4 weeks (around 6/7/2021) for HTN.

## 2021-06-11 DIAGNOSIS — I10 ESSENTIAL HYPERTENSION: ICD-10-CM

## 2021-06-11 DIAGNOSIS — E78.00 PURE HYPERCHOLESTEROLEMIA: ICD-10-CM

## 2021-06-11 LAB
A/G RATIO: 1.6 (ref 1.1–2.2)
ALBUMIN SERPL-MCNC: 4.3 G/DL (ref 3.4–5)
ALP BLD-CCNC: 60 U/L (ref 40–129)
ALT SERPL-CCNC: 30 U/L (ref 10–40)
ANION GAP SERPL CALCULATED.3IONS-SCNC: 13 MMOL/L (ref 3–16)
AST SERPL-CCNC: 16 U/L (ref 15–37)
BILIRUB SERPL-MCNC: 0.6 MG/DL (ref 0–1)
BUN BLDV-MCNC: 14 MG/DL (ref 7–20)
CALCIUM SERPL-MCNC: 8.5 MG/DL (ref 8.3–10.6)
CHLORIDE BLD-SCNC: 102 MMOL/L (ref 99–110)
CHOLESTEROL, TOTAL: 178 MG/DL (ref 0–199)
CO2: 27 MMOL/L (ref 21–32)
CREAT SERPL-MCNC: 0.7 MG/DL (ref 0.9–1.3)
GFR AFRICAN AMERICAN: >60
GFR NON-AFRICAN AMERICAN: >60
GLOBULIN: 2.7 G/DL
GLUCOSE BLD-MCNC: 220 MG/DL (ref 70–99)
HCT VFR BLD CALC: 42.6 % (ref 40.5–52.5)
HDLC SERPL-MCNC: 30 MG/DL (ref 40–60)
HEMOGLOBIN: 14.3 G/DL (ref 13.5–17.5)
LDL CHOLESTEROL CALCULATED: 121 MG/DL
MCH RBC QN AUTO: 28.6 PG (ref 26–34)
MCHC RBC AUTO-ENTMCNC: 33.7 G/DL (ref 31–36)
MCV RBC AUTO: 85 FL (ref 80–100)
PDW BLD-RTO: 13.8 % (ref 12.4–15.4)
PLATELET # BLD: 236 K/UL (ref 135–450)
PMV BLD AUTO: 7 FL (ref 5–10.5)
POTASSIUM SERPL-SCNC: 4 MMOL/L (ref 3.5–5.1)
RBC # BLD: 5.01 M/UL (ref 4.2–5.9)
SODIUM BLD-SCNC: 142 MMOL/L (ref 136–145)
TOTAL PROTEIN: 7 G/DL (ref 6.4–8.2)
TRIGL SERPL-MCNC: 136 MG/DL (ref 0–150)
VLDLC SERPL CALC-MCNC: 27 MG/DL
WBC # BLD: 6.1 K/UL (ref 4–11)

## 2021-06-18 ENCOUNTER — OFFICE VISIT (OUTPATIENT)
Dept: FAMILY MEDICINE CLINIC | Age: 46
End: 2021-06-18
Payer: COMMERCIAL

## 2021-06-18 VITALS
HEART RATE: 65 BPM | TEMPERATURE: 98.5 F | WEIGHT: 315 LBS | HEIGHT: 74 IN | BODY MASS INDEX: 40.43 KG/M2 | SYSTOLIC BLOOD PRESSURE: 134 MMHG | OXYGEN SATURATION: 95 % | DIASTOLIC BLOOD PRESSURE: 88 MMHG

## 2021-06-18 DIAGNOSIS — I10 ESSENTIAL HYPERTENSION: Primary | ICD-10-CM

## 2021-06-18 DIAGNOSIS — E78.00 PURE HYPERCHOLESTEROLEMIA: ICD-10-CM

## 2021-06-18 PROCEDURE — 99213 OFFICE O/P EST LOW 20 MIN: CPT | Performed by: PHYSICIAN ASSISTANT

## 2021-06-18 ASSESSMENT — ENCOUNTER SYMPTOMS
SORE THROAT: 0
COUGH: 0
VOMITING: 0
DIARRHEA: 0
NAUSEA: 0
RHINORRHEA: 0
ABDOMINAL PAIN: 0
SHORTNESS OF BREATH: 0
CONSTIPATION: 0

## 2021-06-18 NOTE — PROGRESS NOTES
2021  Nathaly Azul (: 1975)  39 y.o. HPI    Patient presents for 4 week HTN follow up. Patient atenolol increased at last ov  Regimen now includes  Amlodipine 10 mg daily, losartan 50 mg bid, and atenolol 100 mg daily. Tolerating medications well. Does not routinely monitor at home. Denies ha, cp, soa, le swelling   Has not scheduled sleep apnea eval.     Had fasting labs completed since last visit. All wnl aside for cholesterol panel. Discussed 10 year cardiovascular risk score with patient while in office. Ideally would increase statin but patient has tried in the past with myalgias. The 10-year ASCVD risk score (Kishor Butcher, et al., 2013) is: 7.5%    Values used to calculate the score:      Age: 39 years      Sex: Male      Is Non- : No      Diabetic: Yes      Tobacco smoker: No      Systolic Blood Pressure: 444 mmHg      Is BP treated: Yes      HDL Cholesterol: 30 mg/dL      Total Cholesterol: 178 mg/dL       Review of Systems   Constitutional: Negative for activity change, chills and fever. HENT: Negative for congestion, ear pain, rhinorrhea and sore throat. Eyes: Negative for visual disturbance. Respiratory: Negative for cough and shortness of breath. Cardiovascular: Negative for chest pain and palpitations. Gastrointestinal: Negative for abdominal pain, constipation, diarrhea, nausea and vomiting. Genitourinary: Negative for difficulty urinating and dysuria. Musculoskeletal: Negative for arthralgias and myalgias. Skin: Negative for rash. Neurological: Negative for dizziness, weakness and numbness. Psychiatric/Behavioral: Negative for sleep disturbance. Allergies, past medical history, family history, and social history reviewed and unchanged from previous encounter.      Current Outpatient Medications   Medication Sig Dispense Refill    metFORMIN (GLUCOPHAGE-XR) 750 MG extended release tablet Take 1 tablet by mouth 2 times General: Skin is warm and dry. Findings: No rash. Neurological:      Mental Status: He is alert and oriented to person, place, and time. ASSESSMENT and PLAN:  [facility] was seen today for hypertension and diabetes. Diagnoses and all orders for this visit:    Essential hypertension  - normal in office today   - continue current regimen     Pure hypercholesterolemia  - discussed risks vs benefits of increasing statin. Pt declines increasing statin dose  - continue rosuvastatin 10 mg daily. - we did discuss the importance of DM management in reducing cardiovascular risks. Return in about 3 months (around 9/18/2021) for Diabetes Mellitus.

## 2021-08-29 DIAGNOSIS — I10 ESSENTIAL HYPERTENSION: ICD-10-CM

## 2021-08-30 RX ORDER — LOSARTAN POTASSIUM 50 MG/1
TABLET ORAL
Qty: 180 TABLET | Refills: 1 | Status: SHIPPED | OUTPATIENT
Start: 2021-08-30 | End: 2022-01-13 | Stop reason: SDUPTHER

## 2021-08-30 NOTE — TELEPHONE ENCOUNTER
Refill Request     Last Seen: Last Seen Department: 6/18/2021  Last Seen by PCP: 6/18/2021    Last Written: 5/10/2021    Next Appointment:   Future Appointments   Date Time Provider Yao Ambrose   9/24/2021  8:40 AM JACKIE Lemus [place] [facility]       Future appointment scheduled      Requested Prescriptions     Pending Prescriptions Disp Refills    losartan (COZAAR) 50 MG tablet [Pharmacy Med Name: LOSARTAN POTASSIUM 50 MG TAB] 60 tablet      Sig: TAKE ONE TABLET BY MOUTH TWICE A DAY

## 2021-11-23 DIAGNOSIS — I10 ESSENTIAL HYPERTENSION: ICD-10-CM

## 2021-11-23 DIAGNOSIS — E11.65 UNCONTROLLED TYPE 2 DIABETES MELLITUS WITH HYPERGLYCEMIA (HCC): ICD-10-CM

## 2021-11-23 RX ORDER — METFORMIN HYDROCHLORIDE 750 MG/1
TABLET, EXTENDED RELEASE ORAL
Qty: 60 TABLET | Refills: 0 | Status: SHIPPED | OUTPATIENT
Start: 2021-11-23 | End: 2022-01-13 | Stop reason: SDUPTHER

## 2021-11-23 RX ORDER — AMLODIPINE BESYLATE 10 MG/1
TABLET ORAL
Qty: 30 TABLET | Refills: 0 | Status: SHIPPED | OUTPATIENT
Start: 2021-11-23 | End: 2022-01-13

## 2021-11-23 RX ORDER — ATENOLOL 100 MG/1
TABLET ORAL
Qty: 30 TABLET | Refills: 0 | Status: SHIPPED | OUTPATIENT
Start: 2021-11-23 | End: 2022-01-13 | Stop reason: SDUPTHER

## 2021-11-23 NOTE — TELEPHONE ENCOUNTER
Refill Request     Last Seen: Last Seen Department: 6/18/2021  Last Seen by PCP: 6/18/2021    Last Written: 10/25/21    Next Appointment:   Future Appointments   Date Time Provider Yao Ambrose   12/2/2021  3:30 PM JACKIE Sanches  [place] [facility]       Future appointment scheduled      Requested Prescriptions     Pending Prescriptions Disp Refills    metFORMIN (GLUCOPHAGE-XR) 750 MG extended release tablet [Pharmacy Med Name: metFORMIN HCL  MG TABLET] 60 tablet 0     Sig: TAKE ONE TABLET BY MOUTH TWICE A DAY WITH MEALS    atenolol (TENORMIN) 100 MG tablet [Pharmacy Med Name: ATENOLOL 100 MG TABLET] 30 tablet 0     Sig: TAKE ONE TABLET BY MOUTH DAILY    amLODIPine (NORVASC) 10 MG tablet [Pharmacy Med Name: amLODIPine BESYLATE 10 MG TAB] 30 tablet 0     Sig: TAKE ONE TABLET BY MOUTH DAILY

## 2022-01-13 ENCOUNTER — TELEMEDICINE (OUTPATIENT)
Dept: FAMILY MEDICINE CLINIC | Age: 47
End: 2022-01-13
Payer: COMMERCIAL

## 2022-01-13 DIAGNOSIS — E78.1 PURE HYPERGLYCERIDEMIA: ICD-10-CM

## 2022-01-13 DIAGNOSIS — I10 ESSENTIAL HYPERTENSION: ICD-10-CM

## 2022-01-13 DIAGNOSIS — E11.9 TYPE 2 DIABETES MELLITUS WITHOUT COMPLICATION, WITH LONG-TERM CURRENT USE OF INSULIN (HCC): Primary | ICD-10-CM

## 2022-01-13 DIAGNOSIS — Z79.4 TYPE 2 DIABETES MELLITUS WITHOUT COMPLICATION, WITH LONG-TERM CURRENT USE OF INSULIN (HCC): Primary | ICD-10-CM

## 2022-01-13 PROCEDURE — 99214 OFFICE O/P EST MOD 30 MIN: CPT | Performed by: PHYSICIAN ASSISTANT

## 2022-01-13 RX ORDER — LOSARTAN POTASSIUM 50 MG/1
50 TABLET ORAL 2 TIMES DAILY
Qty: 180 TABLET | Refills: 1 | Status: SHIPPED | OUTPATIENT
Start: 2022-01-13 | End: 2022-09-07 | Stop reason: SDUPTHER

## 2022-01-13 RX ORDER — AMLODIPINE BESYLATE 5 MG/1
5 TABLET ORAL DAILY
Qty: 90 TABLET | Refills: 1 | Status: SHIPPED | OUTPATIENT
Start: 2022-01-13 | End: 2022-08-17

## 2022-01-13 RX ORDER — METFORMIN HYDROCHLORIDE 750 MG/1
TABLET, EXTENDED RELEASE ORAL
Qty: 180 TABLET | Refills: 1 | Status: SHIPPED | OUTPATIENT
Start: 2022-01-13

## 2022-01-13 RX ORDER — ATENOLOL 100 MG/1
TABLET ORAL
Qty: 90 TABLET | Refills: 1 | Status: SHIPPED | OUTPATIENT
Start: 2022-01-13 | End: 2022-09-07 | Stop reason: SDUPTHER

## 2022-01-13 RX ORDER — ATORVASTATIN CALCIUM 20 MG/1
TABLET, FILM COATED ORAL
Qty: 90 TABLET | Refills: 1 | Status: SHIPPED | OUTPATIENT
Start: 2022-01-13 | End: 2022-02-06

## 2022-01-13 RX ORDER — LANCETS 30 GAUGE
EACH MISCELLANEOUS
Qty: 100 EACH | Refills: 1 | Status: SHIPPED | OUTPATIENT
Start: 2022-01-13

## 2022-01-13 ASSESSMENT — ENCOUNTER SYMPTOMS
RHINORRHEA: 0
NAUSEA: 0
SORE THROAT: 0
VOMITING: 0
COUGH: 0
SHORTNESS OF BREATH: 0
CONSTIPATION: 0
DIARRHEA: 0
ABDOMINAL PAIN: 0

## 2022-01-13 NOTE — PROGRESS NOTES
Marisela Azul (:  1975) is a 55 y.o. male,Established patient, here for evaluation of the following chief complaint(s): Diabetes         ASSESSMENT/PLAN:  1. Type 2 diabetes mellitus without complication, with long-term current use of insulin (HCC)  -     Hemoglobin A1C; Future  -     metFORMIN (GLUCOPHAGE-XR) 750 MG extended release tablet; TAKE ONE TABLET BY MOUTH TWICE A DAY WITH MEALS, Disp-180 tablet, R-1Normal  -     blood glucose test strips (ASCENSIA AUTODISC VI;ONE TOUCH ULTRA TEST VI) strip; Disp-100 each, R-3, NormalCheck glucose daily. DM 2 E11.9  -     Lancets MISC; Disp-100 each, R-1, NormalCheck glucose daily. DM 2 E11.65.    2. Essential hypertension  -     RENAL FUNCTION PANEL; Future  -     amLODIPine (NORVASC) 5 MG tablet; Take 1 tablet by mouth daily, Disp-90 tablet, R-1Normal  -     atenolol (TENORMIN) 100 MG tablet; TAKE ONE TABLET BY MOUTH DAILY, Disp-90 tablet, R-1Normal  -     losartan (COZAAR) 50 MG tablet; Take 1 tablet by mouth 2 times daily, Disp-180 tablet, R-1Normal  - episodes ofbradycardia, reduce amlodipine to 5 mg daily. If persists will need to adjust atenolol. 3. Pure hyperglyceridemia  -     atorvastatin (LIPITOR) 20 MG tablet; TAKE ONE TABLET BY MOUTH DAILY, Disp-90 tablet, R-1Normal  - due for repeat labs 2022    Pt to come in for bp check and labs    Return in about 6 months (around 2022) for Hyperlipidemia, Diabetes Mellitus, HTN. SUBJECTIVE/OBJECTIVE:  HPI    HTN: improved since last visit. Pt monitoring at home 130s/70s. Current regimen includes losartan 50 mg BID and atenolol 50 mg daily, amlodipine 10 mg daily . Denies ha, cp, soa, le swelling. Has been on diuretic in the past but stopped 2/2 to side effects. Also with sleep apnea, no cpap/bipap. .      DM: current regimen includes metformin 750 mg BID. Does not monitor BG regularly. Has tried keto in the past, not currently following low carb diet. On statin and arb     HLD: on statin.  No current diet or exercise regimen. Active at work [address] steps daily. Review of Systems   Constitutional: Negative for activity change, chills and fever. HENT: Negative for congestion, ear pain, rhinorrhea and sore throat. Eyes: Negative for visual disturbance. Respiratory: Negative for cough and shortness of breath. Cardiovascular: Negative for chest pain and palpitations. Gastrointestinal: Negative for abdominal pain, constipation, diarrhea, nausea and vomiting. Genitourinary: Negative for difficulty urinating and dysuria. Musculoskeletal: Negative for arthralgias and myalgias. Skin: Negative for rash. Neurological: Negative for dizziness, weakness and numbness. Psychiatric/Behavioral: Negative for sleep disturbance.        Patient-Reported Vitals 1/13/2022   Patient-Reported Weight 400   Patient-Reported Height 6'3\"   Patient-Reported Systolic 048   Patient-Reported Diastolic 78   Patient-Reported Pulse 62   Patient-Reported Temperature 97.3   Patient-Reported SpO2 -        Physical Exam    [INSTRUCTIONS:  \"[x]\" Indicates a positive item  \"[]\" Indicates a negative item  -- DELETE ALL ITEMS NOT EXAMINED]    Constitutional: [x] Appears well-developed and well-nourished [x] No apparent distress      [] Abnormal -     Mental status: [x] Alert and awake  [x] Oriented to person/place/time [x] Able to follow commands    [] Abnormal -     Eyes:   EOM    [x]  Normal    [] Abnormal -   Sclera  [x]  Normal    [] Abnormal -          Discharge [x]  None visible   [] Abnormal -     HENT: [x] Normocephalic, atraumatic  [] Abnormal -   [x] Mouth/Throat: Mucous membranes are moist    External Ears [x] Normal  [] Abnormal -    Neck: [x] No visualized mass [] Abnormal -     Pulmonary/Chest: [x] Respiratory effort normal   [x] No visualized signs of difficulty breathing or respiratory distress        [] Abnormal -      Musculoskeletal:   [x] Normal gait with no signs of ataxia         [x] Normal range of motion of neck        [] Abnormal -     Neurological:        [x] No Facial Asymmetry (Cranial nerve 7 motor function) (limited exam due to video visit)          [x] No gaze palsy        [] Abnormal -          Skin:        [x] No significant exanthematous lesions or discoloration noted on facial skin         [] Abnormal -            Psychiatric:       [x] Normal Affect [] Abnormal -        [x] No Hallucinations    Other pertinent observable physical exam findings:-                Yani Gould Jorge Alberto, was evaluated through a synchronous (real-time) audio-video encounter. The patient (or guardian if applicable) is aware that this is a billable service. Verbal consent to proceed has been obtained within the past 12 months. The visit was conducted pursuant to the emergency declaration under the [address] [place] [address] authority and the [employer] General Act. Patient identification was verified, and a caregiver was present when appropriate. The patient was located in a state where the provider was credentialed to provide care. An electronic signature was used to authenticate this note.     --JACKIE Linton

## 2022-02-06 ENCOUNTER — APPOINTMENT (OUTPATIENT)
Dept: GENERAL RADIOLOGY | Age: 47
End: 2022-02-06
Payer: COMMERCIAL

## 2022-02-06 ENCOUNTER — HOSPITAL ENCOUNTER (EMERGENCY)
Age: 47
Discharge: LEFT BEFORE DISCHARGE | End: 2022-02-06
Attending: STUDENT IN AN ORGANIZED HEALTH CARE EDUCATION/TRAINING PROGRAM
Payer: COMMERCIAL

## 2022-02-06 VITALS
DIASTOLIC BLOOD PRESSURE: 87 MMHG | SYSTOLIC BLOOD PRESSURE: 219 MMHG | TEMPERATURE: 98.9 F | BODY MASS INDEX: 38.36 KG/M2 | HEIGHT: 76 IN | RESPIRATION RATE: 18 BRPM | OXYGEN SATURATION: 96 % | WEIGHT: 315 LBS

## 2022-02-06 DIAGNOSIS — M25.512 ACUTE PAIN OF LEFT SHOULDER: Primary | ICD-10-CM

## 2022-02-06 DIAGNOSIS — Z53.29 LEFT AGAINST MEDICAL ADVICE: ICD-10-CM

## 2022-02-06 DIAGNOSIS — I10 HYPERTENSION, UNSPECIFIED TYPE: ICD-10-CM

## 2022-02-06 LAB
A/G RATIO: 1.4 (ref 1.1–2.2)
ALBUMIN SERPL-MCNC: 4.1 G/DL (ref 3.4–5)
ALP BLD-CCNC: 78 U/L (ref 40–129)
ALT SERPL-CCNC: 46 U/L (ref 10–40)
ANION GAP SERPL CALCULATED.3IONS-SCNC: 10 MMOL/L (ref 3–16)
AST SERPL-CCNC: 23 U/L (ref 15–37)
BASOPHILS ABSOLUTE: 0.1 K/UL (ref 0–0.2)
BASOPHILS RELATIVE PERCENT: 0.9 %
BILIRUB SERPL-MCNC: 0.6 MG/DL (ref 0–1)
BUN BLDV-MCNC: 15 MG/DL (ref 7–20)
CALCIUM SERPL-MCNC: 9 MG/DL (ref 8.3–10.6)
CHLORIDE BLD-SCNC: 97 MMOL/L (ref 99–110)
CO2: 30 MMOL/L (ref 21–32)
CREAT SERPL-MCNC: 0.8 MG/DL (ref 0.9–1.3)
EOSINOPHILS ABSOLUTE: 0.2 K/UL (ref 0–0.6)
EOSINOPHILS RELATIVE PERCENT: 3.1 %
GFR AFRICAN AMERICAN: >60
GFR NON-AFRICAN AMERICAN: >60
GLUCOSE BLD-MCNC: 330 MG/DL (ref 70–99)
HCT VFR BLD CALC: 42.1 % (ref 40.5–52.5)
HEMOGLOBIN: 14.4 G/DL (ref 13.5–17.5)
LYMPHOCYTES ABSOLUTE: 2.1 K/UL (ref 1–5.1)
LYMPHOCYTES RELATIVE PERCENT: 30.2 %
MAGNESIUM: 1.9 MG/DL (ref 1.8–2.4)
MCH RBC QN AUTO: 28.6 PG (ref 26–34)
MCHC RBC AUTO-ENTMCNC: 34.3 G/DL (ref 31–36)
MCV RBC AUTO: 83.4 FL (ref 80–100)
MONOCYTES ABSOLUTE: 0.5 K/UL (ref 0–1.3)
MONOCYTES RELATIVE PERCENT: 7.6 %
NEUTROPHILS ABSOLUTE: 4.1 K/UL (ref 1.7–7.7)
NEUTROPHILS RELATIVE PERCENT: 58.2 %
PDW BLD-RTO: 13.4 % (ref 12.4–15.4)
PLATELET # BLD: 251 K/UL (ref 135–450)
PMV BLD AUTO: 6.9 FL (ref 5–10.5)
POTASSIUM REFLEX MAGNESIUM: 3.5 MMOL/L (ref 3.5–5.1)
RBC # BLD: 5.05 M/UL (ref 4.2–5.9)
SODIUM BLD-SCNC: 137 MMOL/L (ref 136–145)
TOTAL PROTEIN: 7.1 G/DL (ref 6.4–8.2)
TROPONIN: <0.01 NG/ML
WBC # BLD: 7 K/UL (ref 4–11)

## 2022-02-06 PROCEDURE — 84484 ASSAY OF TROPONIN QUANT: CPT

## 2022-02-06 PROCEDURE — 83735 ASSAY OF MAGNESIUM: CPT

## 2022-02-06 PROCEDURE — 71045 X-RAY EXAM CHEST 1 VIEW: CPT

## 2022-02-06 PROCEDURE — 93005 ELECTROCARDIOGRAM TRACING: CPT | Performed by: STUDENT IN AN ORGANIZED HEALTH CARE EDUCATION/TRAINING PROGRAM

## 2022-02-06 PROCEDURE — 99283 EMERGENCY DEPT VISIT LOW MDM: CPT

## 2022-02-06 PROCEDURE — 85025 COMPLETE CBC W/AUTO DIFF WBC: CPT

## 2022-02-06 PROCEDURE — 73030 X-RAY EXAM OF SHOULDER: CPT

## 2022-02-06 PROCEDURE — 80053 COMPREHEN METABOLIC PANEL: CPT

## 2022-02-06 RX ORDER — HYDROCHLOROTHIAZIDE 25 MG/1
25 TABLET ORAL ONCE
Status: DISCONTINUED | OUTPATIENT
Start: 2022-02-06 | End: 2022-02-07 | Stop reason: HOSPADM

## 2022-02-06 ASSESSMENT — PAIN DESCRIPTION - PAIN TYPE: TYPE: ACUTE PAIN

## 2022-02-06 ASSESSMENT — PAIN DESCRIPTION - LOCATION: LOCATION: SHOULDER

## 2022-02-06 ASSESSMENT — PAIN SCALES - GENERAL: PAINLEVEL_OUTOF10: 2

## 2022-02-07 LAB
EKG ATRIAL RATE: 74 BPM
EKG DIAGNOSIS: NORMAL
EKG P AXIS: 39 DEGREES
EKG P-R INTERVAL: 172 MS
EKG Q-T INTERVAL: 392 MS
EKG QRS DURATION: 112 MS
EKG QTC CALCULATION (BAZETT): 435 MS
EKG R AXIS: -1 DEGREES
EKG T AXIS: 14 DEGREES
EKG VENTRICULAR RATE: 74 BPM

## 2022-02-07 PROCEDURE — 93010 ELECTROCARDIOGRAM REPORT: CPT | Performed by: INTERNAL MEDICINE

## 2022-02-07 NOTE — ED NOTES
Writer went into room to sign patient out AMA. Patients visitor repeatedly stated \" we have been her for 3 hours\". Explained to visitor that 3 hours was a normal wait and that we were extremely busy. Visitor concerned that patient \" did not have a button to push\" if he needed help. Patient had call button wedged next to him.  Patient expressed continued desire to leave AMA, signed papers and left     Jessi Noble RN  02/06/22 [id number]

## 2022-02-07 NOTE — ED PROVIDER NOTES
[address] ED      CHIEF COMPLAINT  Shoulder Pain (reports new onset of left sided pain, down the arm to elbow. denies jaw pain or chest pain. started around 1820)     HISTORY OF [id number] Chicho Youngblood is a 55 y.o. male  who presents to the ED complaining of left shoulder pain, a feeling of \"strangeness\" that lasted over him. Patient states that he was driving when he had onset of left shoulder pain and was feeling that left over him that lasted a few seconds. States that he is still having some left shoulder pain but it has gotten better, the other sensation is now gone. He denies any actual chest pain or shortness of breath. He states that he has a history of hypertension, denies any other medical history, denies smoking history. He denies any other complaints or concerns. No other complaints, modifying factors or associated symptoms. I have reviewed the following from the nursing documentation. Past Medical History:   Diagnosis Date    Acid reflux     Hypertension     Sleep apnea      Past Surgical History:   Procedure Laterality Date    KNEE ARTHROSCOPY Right     WISDOM TOOTH EXTRACTION Bilateral      Family History   Problem Relation Age of Onset    Stroke Maternal Grandfather      Social History     Socioeconomic History    Marital status:      Spouse name: Not on file    Number of children: 1    Years of education: Not on file    Highest education level: Not on file   Occupational History    Occupation:    Tobacco Use    Smoking status: Never Smoker    Smokeless tobacco: Former User   Vaping Use    Vaping Use: Not on file   Substance and Sexual Activity    Alcohol use:  Yes     Alcohol/week: 1.0 standard drink     Types: 1 Shots of liquor per week     Comment: occasional    Drug use: No    Sexual activity: Yes     Partners: Female   Other Topics Concern    Not on file   Social History Narrative    Not on file     Social Determinants of Health     Financial Resource Strain: Low Risk     Difficulty of Paying Living Expenses: Not hard at all   Food Insecurity: No Food Insecurity    Worried About Running Out of Food in the Last Year: Never true    [place] of Food in the Last Year: Never true   Transportation Needs: No Transportation Needs    Lack of Transportation (Medical): No    Lack of Transportation (Non-Medical):  No   Physical Activity:     Days of Exercise per Week: Not on file    Minutes of Exercise per Session: Not on file   Stress:     Feeling of Stress : Not on file   Social Connections:     Frequency of Communication with Friends and Family: Not on file    Frequency of Social Gatherings with Friends and Family: Not on file    Attends Spiritism Services: Not on file    Active Member of [address] [place] [address] [employer]" or Organizations: Not on file    Attends Club or Organization Meetings: Not on file    Marital Status: Not on file   Intimate Partner Violence:     Fear of Current or Ex-Partner: Not on file    Emotionally Abused: Not on file    Physically Abused: Not on file    Sexually Abused: Not on file   Housing Stability:     Unable to Pay for Housing in the Last Year: Not on file    Number of [place] in the Last Year: Not on file    Unstable Housing in the Last Year: Not on file     Current Facility-Administered Medications   Medication Dose Route Frequency Provider Last Rate Last Admin    hydroCHLOROthiazide (HYDRODIURIL) tablet 25 mg  25 mg Oral Once Amalia Beltran MD         Current Outpatient Medications   Medication Sig Dispense Refill    amLODIPine (NORVASC) 5 MG tablet Take 1 tablet by mouth daily 90 tablet 1    atenolol (TENORMIN) 100 MG tablet TAKE ONE TABLET BY MOUTH DAILY 90 tablet 1    losartan (COZAAR) 50 MG tablet Take 1 tablet by mouth 2 times daily 180 tablet 1    metFORMIN (GLUCOPHAGE-XR) 750 MG extended release tablet TAKE ONE TABLET BY MOUTH TWICE A DAY WITH MEALS 180 tablet 1    blood glucose test strips (ASCENSIA AUTODISC VI;ONE TOUCH ULTRA TEST VI) strip Check glucose daily. DM 2 E11.9 100 each 3    Lancets MISC Check glucose daily. DM 2 E11.65. 100 each 1    Blood Pressure KIT 1 kit by Does not apply route daily 1 kit 0    Blood Glucose Monitoring Suppl (ACCU-CHEK ERLIN PLUS) w/Device KIT 1 Device by Does not apply route daily 1 kit 0     No Known Allergies    REVIEW OF SYSTEMS  10 systems reviewed, pertinent positives per HPI otherwise noted to be negative. PHYSICAL EXAM  BP (!) 219/87   Temp 98.9 °F (37.2 °C) (Oral)   Resp 18   Ht 6' 4\" (1.93 m)   Wt (!) 400 lb (181.4 kg)   SpO2 96%   BMI 48.69 kg/m²    GENERAL APPEARANCE: Awake and alert. Cooperative. No acute distress. HENT: Normocephalic. Atraumatic. NECK: Supple. EYES: PERRL. EOM's grossly intact. HEART/CHEST: RRR. No murmurs. LUNGS: Respirations unlabored. CTAB. Good air exchange. Speaking comfortably in full sentences. ABDOMEN: No tenderness. Soft. Non-distended. No masses. No organomegaly. No guarding or rebound. MUSCULOSKELETAL: No extremity edema. Compartments soft. No deformity. No tenderness in the extremities. All extremities neurovascularly intact. SKIN: Warm and dry. No acute rashes. NEUROLOGICAL: Alert and oriented. CN's 2-12 intact. No gross facial drooping. Strength 5/5, sensation intact. Gait normal.  PSYCHIATRIC: Normal mood and affect. LABS  I have reviewed all labs for this visit.    Results for orders placed or performed during the hospital encounter of 02/06/22   CBC Auto Differential   Result Value Ref Range    WBC 7.0 4.0 - 11.0 K/uL    RBC 5.05 4.20 - 5.90 M/uL    Hemoglobin 14.4 13.5 - 17.5 g/dL    Hematocrit 42.1 40.5 - 52.5 %    MCV 83.4 80.0 - 100.0 fL    MCH 28.6 26.0 - 34.0 pg    MCHC 34.3 31.0 - 36.0 g/dL    RDW 13.4 12.4 - 15.4 %    Platelets 883 743 - 372 K/uL    MPV 6.9 5.0 - 10.5 fL    Neutrophils % 58.2 %    Lymphocytes % 30.2 %    Monocytes % 7.6 %    Eosinophils % 3.1 %    Basophils % 0.9 %    Neutrophils Absolute 4.1 1.7 - 7.7 K/uL    Lymphocytes Absolute 2.1 1.0 - 5.1 K/uL    Monocytes Absolute 0.5 0.0 - 1.3 K/uL    Eosinophils Absolute 0.2 0.0 - 0.6 K/uL    Basophils Absolute 0.1 0.0 - 0.2 K/uL   Comprehensive Metabolic Panel w/ Reflex to MG   Result Value Ref Range    Sodium 137 136 - 145 mmol/L    Potassium reflex Magnesium 3.5 3.5 - 5.1 mmol/L    Chloride 97 (L) 99 - 110 mmol/L    CO2 30 21 - 32 mmol/L    Anion Gap 10 3 - 16    Glucose 330 (H) 70 - 99 mg/dL    BUN 15 7 - 20 mg/dL    CREATININE 0.8 (L) 0.9 - 1.3 mg/dL    GFR Non-African American >60 >60    GFR African American >60 >60    Calcium 9.0 8.3 - 10.6 mg/dL    Total Protein 7.1 6.4 - 8.2 g/dL    Albumin 4.1 3.4 - 5.0 g/dL    Albumin/Globulin Ratio 1.4 1.1 - 2.2    Total Bilirubin 0.6 0.0 - 1.0 mg/dL    Alkaline Phosphatase 78 40 - 129 U/L    ALT 46 (H) 10 - 40 U/L    AST 23 15 - 37 U/L   Troponin   Result Value Ref Range    Troponin <0.01 <0.01 ng/mL   Magnesium   Result Value Ref Range    Magnesium 1.90 1.80 - 2.40 mg/dL   EKG 12 Lead   Result Value Ref Range    Ventricular Rate 74 BPM    Atrial Rate 74 BPM    P-R Interval 172 ms    QRS Duration 112 ms    Q-T Interval 392 ms    QTc Calculation (Bazett) 435 ms    P Axis 39 degrees    R Axis -1 degrees    T Axis 14 degrees    Diagnosis       Normal sinus rhythmNormal ECGWhen compared with ECG of 22-DEC-2007 13:34,Previous ECG has undetermined rhythm, needs reviewQuestionable change in QRS axis       ECG  The Ekg interpreted by me shows  normal sinus rhythm with a rate of 74  Axis is   Normal  QTc is  normal  Intervals and Durations are unremarkable. ST Segments: nonspecific changes  No previous available for comparison    RADIOLOGY  XR SHOULDER LEFT (MIN 2 VIEWS)   Final Result   1. No acute abnormality. XR CHEST PORTABLE   Final Result   1. No acute abnormality. ED COURSE/MDM  Patient seen and evaluated. Old records reviewed.  Labs and imaging reviewed and results discussed with patient. Patient is a 51-year-old male, presenting with concerns for left shoulder pain that started while he was driving, no known injury, and a feeling of \"strangeness\" that washed overhead and lasted a few seconds. Full HPI as detailed above. Upon arrival in the ED, vitals remarkable for hypertension significant at 219/87. Otherwise vitals reassuring. Patient is resting comfortably and is in no acute distress. EKG shows some nonspecific T wave abnormalities in leads III and aVF, no STEMI, no acute dysrhythmia or evidence of acute ischemia. Labs were performed and reassuring. No acute concerning electrolyte abnormalities, glucose is elevated, no evidence of acidosis. Troponin is negative. No leukocytosis or anemia. X-rays of the chest and left shoulder were performed and are negative as well. Plans were to repeat a troponin, patient does have multiple cardiac risk factors and is significantly hypertensive here in the department, concern for possible hypertensive urgency versus cardiac etiology of his shoulder pain. After initial troponin and work-up, patient decided that he would like to leave [address] rather than wait for an additional troponin or any further work-up or treatment. I did order him a dose of hydrochlorothiazide here in the department. He did not wish to stay for that either. He left AGAINST MEDICAL ADVICE, risks of leaving [address] were discussed with the patient including but not limited to worsening of his condition, heart attack, stroke, permanent disability, and even death and he still chooses to leave [address] Was advised that he return to the ED at any point in time should he change his mind or desire further work-up or treatment of his condition.     During the patient's ED course, the patient was given:  Medications   hydroCHLOROthiazide (HYDRODIURIL) tablet 25 mg (has no administration in time range) CLINICAL IMPRESSION  1. Acute pain of left shoulder    2. Hypertension, unspecified type    3. Left against medical advice        Blood pressure (!) 219/87, temperature 98.9 °F (37.2 °C), temperature source Oral, resp. rate 18, height 6' 4\" (1.93 m), weight (!) 400 lb (181.4 kg), SpO2 96 %. DISPOSITION  Haley Azul left against medical advice. Patient was given scripts for the following medications. I counseled patient how to take these medications. Discharge Medication List as of 2/6/2022 11:22 PM          Follow-up with:  JACKIE Dumont [address] [place] [id number] [phone]    Schedule an appointment as soon as possible for a visit       [facility] [place] [facility]  [address] [facility]  [phone]  Go to   If symptoms worsen      DISCLAIMER: This chart was created using Dragon dictation software. Efforts were made by me to ensure accuracy, however some errors may be present due to limitations of this technology and occasionally words are not transcribed correctly.        Nathaly Celis MD  02/06/22 7414       Nathaly Celis MD  02/06/22 1376

## 2022-03-09 ENCOUNTER — HOSPITAL ENCOUNTER (INPATIENT)
Age: 47
LOS: 4 days | Discharge: HOME | DRG: 853 | End: 2022-03-14
Attending: EMERGENCY MEDICINE | Admitting: INTERNAL MEDICINE
Payer: COMMERCIAL

## 2022-03-09 ENCOUNTER — APPOINTMENT (OUTPATIENT)
Dept: CT IMAGING | Age: 47
DRG: 853 | End: 2022-03-09
Payer: COMMERCIAL

## 2022-03-09 DIAGNOSIS — L02.214 ABSCESS OF GROIN: ICD-10-CM

## 2022-03-09 DIAGNOSIS — L03.314 CELLULITIS OF GROIN: Primary | ICD-10-CM

## 2022-03-09 DIAGNOSIS — M72.6 NECROTIZING FASCIITIS (HCC): ICD-10-CM

## 2022-03-09 LAB
A/G RATIO: 1.6 (ref 1.1–2.2)
ALBUMIN SERPL-MCNC: 4.2 G/DL (ref 3.4–5)
ALP BLD-CCNC: 69 U/L (ref 40–129)
ALT SERPL-CCNC: 29 U/L (ref 10–40)
ANION GAP SERPL CALCULATED.3IONS-SCNC: 13 MMOL/L (ref 3–16)
AST SERPL-CCNC: 17 U/L (ref 15–37)
BASOPHILS ABSOLUTE: 0.1 K/UL (ref 0–0.2)
BASOPHILS RELATIVE PERCENT: 0.7 %
BILIRUB SERPL-MCNC: 1.3 MG/DL (ref 0–1)
BUN BLDV-MCNC: 15 MG/DL (ref 7–20)
CALCIUM SERPL-MCNC: 8.7 MG/DL (ref 8.3–10.6)
CHLORIDE BLD-SCNC: 97 MMOL/L (ref 99–110)
CO2: 24 MMOL/L (ref 21–32)
CREAT SERPL-MCNC: 0.8 MG/DL (ref 0.9–1.3)
EOSINOPHILS ABSOLUTE: 0.1 K/UL (ref 0–0.6)
EOSINOPHILS RELATIVE PERCENT: 0.7 %
GFR AFRICAN AMERICAN: >60
GFR NON-AFRICAN AMERICAN: >60
GLUCOSE BLD-MCNC: 246 MG/DL (ref 70–99)
HCT VFR BLD CALC: 41.6 % (ref 40.5–52.5)
HEMOGLOBIN: 14 G/DL (ref 13.5–17.5)
LACTIC ACID: 1.4 MMOL/L (ref 0.4–2)
LYMPHOCYTES ABSOLUTE: 1.2 K/UL (ref 1–5.1)
LYMPHOCYTES RELATIVE PERCENT: 9.6 %
MCH RBC QN AUTO: 28.2 PG (ref 26–34)
MCHC RBC AUTO-ENTMCNC: 33.6 G/DL (ref 31–36)
MCV RBC AUTO: 83.9 FL (ref 80–100)
MONOCYTES ABSOLUTE: 1 K/UL (ref 0–1.3)
MONOCYTES RELATIVE PERCENT: 7.6 %
NEUTROPHILS ABSOLUTE: 10.5 K/UL (ref 1.7–7.7)
NEUTROPHILS RELATIVE PERCENT: 81.4 %
PDW BLD-RTO: 13.5 % (ref 12.4–15.4)
PLATELET # BLD: 237 K/UL (ref 135–450)
PMV BLD AUTO: 6.8 FL (ref 5–10.5)
POTASSIUM SERPL-SCNC: 3.4 MMOL/L (ref 3.5–5.1)
RBC # BLD: 4.95 M/UL (ref 4.2–5.9)
SODIUM BLD-SCNC: 134 MMOL/L (ref 136–145)
TOTAL PROTEIN: 6.9 G/DL (ref 6.4–8.2)
WBC # BLD: 12.9 K/UL (ref 4–11)

## 2022-03-09 PROCEDURE — 84145 PROCALCITONIN (PCT): CPT

## 2022-03-09 PROCEDURE — 80053 COMPREHEN METABOLIC PANEL: CPT

## 2022-03-09 PROCEDURE — 83735 ASSAY OF MAGNESIUM: CPT

## 2022-03-09 PROCEDURE — 2580000003 HC RX 258: Performed by: NURSE PRACTITIONER

## 2022-03-09 PROCEDURE — 85025 COMPLETE CBC W/AUTO DIFF WBC: CPT

## 2022-03-09 PROCEDURE — 74177 CT ABD & PELVIS W/CONTRAST: CPT

## 2022-03-09 PROCEDURE — 83605 ASSAY OF LACTIC ACID: CPT

## 2022-03-09 PROCEDURE — 6360000002 HC RX W HCPCS: Performed by: NURSE PRACTITIONER

## 2022-03-09 PROCEDURE — 96374 THER/PROPH/DIAG INJ IV PUSH: CPT

## 2022-03-09 PROCEDURE — 99283 EMERGENCY DEPT VISIT LOW MDM: CPT

## 2022-03-09 PROCEDURE — 6360000004 HC RX CONTRAST MEDICATION: Performed by: NURSE PRACTITIONER

## 2022-03-09 RX ADMIN — IOPAMIDOL 75 ML: 755 INJECTION, SOLUTION INTRAVENOUS at 21:31

## 2022-03-09 RX ADMIN — VANCOMYCIN HYDROCHLORIDE 1000 MG: 1 INJECTION, POWDER, LYOPHILIZED, FOR SOLUTION INTRAVENOUS at 23:19

## 2022-03-09 ASSESSMENT — PAIN DESCRIPTION - LOCATION: LOCATION: LEG

## 2022-03-09 ASSESSMENT — PAIN - FUNCTIONAL ASSESSMENT: PAIN_FUNCTIONAL_ASSESSMENT: 0-10

## 2022-03-09 ASSESSMENT — PAIN SCALES - GENERAL: PAINLEVEL_OUTOF10: 8

## 2022-03-09 ASSESSMENT — PAIN DESCRIPTION - ORIENTATION: ORIENTATION: UPPER;LEFT

## 2022-03-09 ASSESSMENT — PAIN DESCRIPTION - PAIN TYPE: TYPE: ACUTE PAIN

## 2022-03-10 ENCOUNTER — APPOINTMENT (OUTPATIENT)
Dept: GENERAL RADIOLOGY | Age: 47
DRG: 853 | End: 2022-03-10
Payer: COMMERCIAL

## 2022-03-10 ENCOUNTER — ANESTHESIA (OUTPATIENT)
Dept: OPERATING ROOM | Age: 47
DRG: 853 | End: 2022-03-10
Payer: COMMERCIAL

## 2022-03-10 ENCOUNTER — ANESTHESIA EVENT (OUTPATIENT)
Dept: OPERATING ROOM | Age: 47
DRG: 853 | End: 2022-03-10
Payer: COMMERCIAL

## 2022-03-10 VITALS
DIASTOLIC BLOOD PRESSURE: 55 MMHG | RESPIRATION RATE: 2 BRPM | SYSTOLIC BLOOD PRESSURE: 103 MMHG | TEMPERATURE: 98.4 F | OXYGEN SATURATION: 97 %

## 2022-03-10 PROBLEM — M72.6 NECROTIZING FASCIITIS (HCC): Status: ACTIVE | Noted: 2022-03-10

## 2022-03-10 LAB
BASOPHILS ABSOLUTE: 0.1 K/UL (ref 0–0.2)
BASOPHILS RELATIVE PERCENT: 0.8 %
BILIRUBIN URINE: NEGATIVE
BLOOD, URINE: NEGATIVE
CLARITY: CLEAR
COLOR: YELLOW
EKG ATRIAL RATE: 97 BPM
EKG DIAGNOSIS: NORMAL
EKG P AXIS: 29 DEGREES
EKG P-R INTERVAL: 158 MS
EKG Q-T INTERVAL: 340 MS
EKG QRS DURATION: 106 MS
EKG QTC CALCULATION (BAZETT): 431 MS
EKG R AXIS: 78 DEGREES
EKG T AXIS: -3 DEGREES
EKG VENTRICULAR RATE: 97 BPM
EOSINOPHILS ABSOLUTE: 0 K/UL (ref 0–0.6)
EOSINOPHILS RELATIVE PERCENT: 0.2 %
ESTIMATED AVERAGE GLUCOSE: 266.1 MG/DL
GLUCOSE BLD-MCNC: 193 MG/DL (ref 70–99)
GLUCOSE BLD-MCNC: 211 MG/DL (ref 70–99)
GLUCOSE BLD-MCNC: 222 MG/DL (ref 70–99)
GLUCOSE BLD-MCNC: 225 MG/DL (ref 70–99)
GLUCOSE BLD-MCNC: 237 MG/DL (ref 70–99)
GLUCOSE BLD-MCNC: 242 MG/DL (ref 70–99)
GLUCOSE URINE: >=1000 MG/DL
HBA1C MFR BLD: 10.9 %
HCT VFR BLD CALC: 42.1 % (ref 40.5–52.5)
HEMOGLOBIN: 14.2 G/DL (ref 13.5–17.5)
KETONES, URINE: 40 MG/DL
LEUKOCYTE ESTERASE, URINE: NEGATIVE
LYMPHOCYTES ABSOLUTE: 1.2 K/UL (ref 1–5.1)
LYMPHOCYTES RELATIVE PERCENT: 8.8 %
MAGNESIUM: 1.9 MG/DL (ref 1.8–2.4)
MAGNESIUM: 2 MG/DL (ref 1.8–2.4)
MCH RBC QN AUTO: 28.1 PG (ref 26–34)
MCHC RBC AUTO-ENTMCNC: 33.6 G/DL (ref 31–36)
MCV RBC AUTO: 83.7 FL (ref 80–100)
MICROSCOPIC EXAMINATION: ABNORMAL
MONOCYTES ABSOLUTE: 1.4 K/UL (ref 0–1.3)
MONOCYTES RELATIVE PERCENT: 10.7 %
NEUTROPHILS ABSOLUTE: 10.4 K/UL (ref 1.7–7.7)
NEUTROPHILS RELATIVE PERCENT: 79.5 %
NITRITE, URINE: NEGATIVE
PDW BLD-RTO: 13.2 % (ref 12.4–15.4)
PERFORMED ON: ABNORMAL
PH UA: 6 (ref 5–8)
PLATELET # BLD: 264 K/UL (ref 135–450)
PMV BLD AUTO: 6.9 FL (ref 5–10.5)
PROCALCITONIN: 0.39 NG/ML (ref 0–0.15)
PROCALCITONIN: 0.43 NG/ML (ref 0–0.15)
PROTEIN UA: NEGATIVE MG/DL
RBC # BLD: 5.04 M/UL (ref 4.2–5.9)
SARS-COV-2, NAAT: NOT DETECTED
SPECIFIC GRAVITY UA: 1.01 (ref 1–1.03)
URINE TYPE: ABNORMAL
UROBILINOGEN, URINE: 2 E.U./DL
VANCOMYCIN TROUGH: 10.5 UG/ML (ref 10–20)
WBC # BLD: 13.1 K/UL (ref 4–11)

## 2022-03-10 PROCEDURE — 6360000002 HC RX W HCPCS: Performed by: SURGERY

## 2022-03-10 PROCEDURE — 2580000003 HC RX 258: Performed by: SURGERY

## 2022-03-10 PROCEDURE — 2709999900 HC NON-CHARGEABLE SUPPLY: Performed by: SURGERY

## 2022-03-10 PROCEDURE — 2580000003 HC RX 258: Performed by: NURSE ANESTHETIST, CERTIFIED REGISTERED

## 2022-03-10 PROCEDURE — 87635 SARS-COV-2 COVID-19 AMP PRB: CPT

## 2022-03-10 PROCEDURE — 2500000003 HC RX 250 WO HCPCS: Performed by: INTERNAL MEDICINE

## 2022-03-10 PROCEDURE — 6360000002 HC RX W HCPCS: Performed by: INTERNAL MEDICINE

## 2022-03-10 PROCEDURE — 2580000003 HC RX 258: Performed by: INTERNAL MEDICINE

## 2022-03-10 PROCEDURE — 99222 1ST HOSP IP/OBS MODERATE 55: CPT | Performed by: SURGERY

## 2022-03-10 PROCEDURE — A4217 STERILE WATER/SALINE, 500 ML: HCPCS | Performed by: SURGERY

## 2022-03-10 PROCEDURE — 94761 N-INVAS EAR/PLS OXIMETRY MLT: CPT

## 2022-03-10 PROCEDURE — 90471 IMMUNIZATION ADMIN: CPT | Performed by: INTERNAL MEDICINE

## 2022-03-10 PROCEDURE — 1200000000 HC SEMI PRIVATE

## 2022-03-10 PROCEDURE — 6360000002 HC RX W HCPCS: Performed by: NURSE ANESTHETIST, CERTIFIED REGISTERED

## 2022-03-10 PROCEDURE — 83735 ASSAY OF MAGNESIUM: CPT

## 2022-03-10 PROCEDURE — 2500000003 HC RX 250 WO HCPCS: Performed by: SURGERY

## 2022-03-10 PROCEDURE — 2500000003 HC RX 250 WO HCPCS: Performed by: NURSE ANESTHETIST, CERTIFIED REGISTERED

## 2022-03-10 PROCEDURE — 84145 PROCALCITONIN (PCT): CPT

## 2022-03-10 PROCEDURE — 0KBR0ZZ EXCISION OF LEFT UPPER LEG MUSCLE, OPEN APPROACH: ICD-10-PCS | Performed by: SURGERY

## 2022-03-10 PROCEDURE — 3600000003 HC SURGERY LEVEL 3 BASE: Performed by: SURGERY

## 2022-03-10 PROCEDURE — 6370000000 HC RX 637 (ALT 250 FOR IP): Performed by: INTERNAL MEDICINE

## 2022-03-10 PROCEDURE — 71045 X-RAY EXAM CHEST 1 VIEW: CPT

## 2022-03-10 PROCEDURE — 81003 URINALYSIS AUTO W/O SCOPE: CPT

## 2022-03-10 PROCEDURE — 80202 ASSAY OF VANCOMYCIN: CPT

## 2022-03-10 PROCEDURE — 3600000013 HC SURGERY LEVEL 3 ADDTL 15MIN: Performed by: SURGERY

## 2022-03-10 PROCEDURE — 87077 CULTURE AEROBIC IDENTIFY: CPT

## 2022-03-10 PROCEDURE — 87205 SMEAR GRAM STAIN: CPT

## 2022-03-10 PROCEDURE — 11043 DBRDMT MUSC&/FSCA 1ST 20/<: CPT | Performed by: SURGERY

## 2022-03-10 PROCEDURE — 87075 CULTR BACTERIA EXCEPT BLOOD: CPT

## 2022-03-10 PROCEDURE — 90715 TDAP VACCINE 7 YRS/> IM: CPT | Performed by: INTERNAL MEDICINE

## 2022-03-10 PROCEDURE — 93010 ELECTROCARDIOGRAM REPORT: CPT | Performed by: INTERNAL MEDICINE

## 2022-03-10 PROCEDURE — 3700000001 HC ADD 15 MINUTES (ANESTHESIA): Performed by: SURGERY

## 2022-03-10 PROCEDURE — 87070 CULTURE OTHR SPECIMN AEROBIC: CPT

## 2022-03-10 PROCEDURE — 7100000001 HC PACU RECOVERY - ADDTL 15 MIN: Performed by: SURGERY

## 2022-03-10 PROCEDURE — 3700000000 HC ANESTHESIA ATTENDED CARE: Performed by: SURGERY

## 2022-03-10 PROCEDURE — 93005 ELECTROCARDIOGRAM TRACING: CPT | Performed by: INTERNAL MEDICINE

## 2022-03-10 PROCEDURE — 87040 BLOOD CULTURE FOR BACTERIA: CPT

## 2022-03-10 PROCEDURE — 83036 HEMOGLOBIN GLYCOSYLATED A1C: CPT

## 2022-03-10 PROCEDURE — 11046 DBRDMT MUSC&/FSCA EA ADDL: CPT | Performed by: SURGERY

## 2022-03-10 PROCEDURE — 85025 COMPLETE CBC W/AUTO DIFF WBC: CPT

## 2022-03-10 PROCEDURE — 2700000000 HC OXYGEN THERAPY PER DAY

## 2022-03-10 PROCEDURE — 7100000000 HC PACU RECOVERY - FIRST 15 MIN: Performed by: SURGERY

## 2022-03-10 PROCEDURE — 36415 COLL VENOUS BLD VENIPUNCTURE: CPT

## 2022-03-10 RX ORDER — ATENOLOL 25 MG/1
50 TABLET ORAL DAILY
Status: DISCONTINUED | OUTPATIENT
Start: 2022-03-10 | End: 2022-03-13

## 2022-03-10 RX ORDER — MIDAZOLAM HYDROCHLORIDE 1 MG/ML
INJECTION INTRAMUSCULAR; INTRAVENOUS PRN
Status: DISCONTINUED | OUTPATIENT
Start: 2022-03-10 | End: 2022-03-10 | Stop reason: SDUPTHER

## 2022-03-10 RX ORDER — ONDANSETRON 2 MG/ML
INJECTION INTRAMUSCULAR; INTRAVENOUS PRN
Status: DISCONTINUED | OUTPATIENT
Start: 2022-03-10 | End: 2022-03-10 | Stop reason: SDUPTHER

## 2022-03-10 RX ORDER — SODIUM CHLORIDE 9 MG/ML
25 INJECTION, SOLUTION INTRAVENOUS PRN
Status: DISCONTINUED | OUTPATIENT
Start: 2022-03-10 | End: 2022-03-14 | Stop reason: HOSPADM

## 2022-03-10 RX ORDER — ACETAMINOPHEN 650 MG/1
650 SUPPOSITORY RECTAL EVERY 6 HOURS PRN
Status: DISCONTINUED | OUTPATIENT
Start: 2022-03-10 | End: 2022-03-14 | Stop reason: HOSPADM

## 2022-03-10 RX ORDER — MEPERIDINE HYDROCHLORIDE 50 MG/ML
12.5 INJECTION INTRAMUSCULAR; INTRAVENOUS; SUBCUTANEOUS EVERY 5 MIN PRN
Status: DISCONTINUED | OUTPATIENT
Start: 2022-03-10 | End: 2022-03-10 | Stop reason: HOSPADM

## 2022-03-10 RX ORDER — LIDOCAINE HYDROCHLORIDE 20 MG/ML
INJECTION, SOLUTION INFILTRATION; PERINEURAL PRN
Status: DISCONTINUED | OUTPATIENT
Start: 2022-03-10 | End: 2022-03-10 | Stop reason: SDUPTHER

## 2022-03-10 RX ORDER — LABETALOL HYDROCHLORIDE 5 MG/ML
5 INJECTION, SOLUTION INTRAVENOUS EVERY 10 MIN PRN
Status: DISCONTINUED | OUTPATIENT
Start: 2022-03-10 | End: 2022-03-10 | Stop reason: HOSPADM

## 2022-03-10 RX ORDER — LANOLIN ALCOHOL/MO/W.PET/CERES
400 CREAM (GRAM) TOPICAL ONCE
Status: COMPLETED | OUTPATIENT
Start: 2022-03-10 | End: 2022-03-10

## 2022-03-10 RX ORDER — HYDROMORPHONE HCL 110MG/55ML
1 PATIENT CONTROLLED ANALGESIA SYRINGE INTRAVENOUS
Status: DISCONTINUED | OUTPATIENT
Start: 2022-03-10 | End: 2022-03-14 | Stop reason: HOSPADM

## 2022-03-10 RX ORDER — OXYCODONE HYDROCHLORIDE 5 MG/1
10 TABLET ORAL EVERY 4 HOURS PRN
Status: DISCONTINUED | OUTPATIENT
Start: 2022-03-10 | End: 2022-03-14 | Stop reason: HOSPADM

## 2022-03-10 RX ORDER — ONDANSETRON 2 MG/ML
4 INJECTION INTRAMUSCULAR; INTRAVENOUS
Status: DISCONTINUED | OUTPATIENT
Start: 2022-03-10 | End: 2022-03-10 | Stop reason: HOSPADM

## 2022-03-10 RX ORDER — DEXTROSE MONOHYDRATE 25 G/50ML
12.5 INJECTION, SOLUTION INTRAVENOUS PRN
Status: DISCONTINUED | OUTPATIENT
Start: 2022-03-10 | End: 2022-03-14 | Stop reason: HOSPADM

## 2022-03-10 RX ORDER — LABETALOL HYDROCHLORIDE 5 MG/ML
10 INJECTION, SOLUTION INTRAVENOUS EVERY 4 HOURS PRN
Status: DISCONTINUED | OUTPATIENT
Start: 2022-03-10 | End: 2022-03-14 | Stop reason: HOSPADM

## 2022-03-10 RX ORDER — AMLODIPINE BESYLATE 5 MG/1
5 TABLET ORAL DAILY
Status: DISCONTINUED | OUTPATIENT
Start: 2022-03-10 | End: 2022-03-14 | Stop reason: HOSPADM

## 2022-03-10 RX ORDER — NICOTINE POLACRILEX 4 MG
15 LOZENGE BUCCAL PRN
Status: DISCONTINUED | OUTPATIENT
Start: 2022-03-10 | End: 2022-03-14 | Stop reason: HOSPADM

## 2022-03-10 RX ORDER — OXYCODONE HYDROCHLORIDE 5 MG/1
10 TABLET ORAL PRN
Status: DISCONTINUED | OUTPATIENT
Start: 2022-03-10 | End: 2022-03-10 | Stop reason: HOSPADM

## 2022-03-10 RX ORDER — MAGNESIUM HYDROXIDE 1200 MG/15ML
LIQUID ORAL CONTINUOUS PRN
Status: COMPLETED | OUTPATIENT
Start: 2022-03-10 | End: 2022-03-10

## 2022-03-10 RX ORDER — PROMETHAZINE HYDROCHLORIDE 25 MG/1
12.5 TABLET ORAL EVERY 6 HOURS PRN
Status: DISCONTINUED | OUTPATIENT
Start: 2022-03-10 | End: 2022-03-14 | Stop reason: HOSPADM

## 2022-03-10 RX ORDER — KETAMINE HYDROCHLORIDE 100 MG/ML
INJECTION, SOLUTION INTRAMUSCULAR; INTRAVENOUS PRN
Status: DISCONTINUED | OUTPATIENT
Start: 2022-03-10 | End: 2022-03-10 | Stop reason: SDUPTHER

## 2022-03-10 RX ORDER — SODIUM CHLORIDE 9 MG/ML
INJECTION, SOLUTION INTRAVENOUS CONTINUOUS PRN
Status: DISCONTINUED | OUTPATIENT
Start: 2022-03-10 | End: 2022-03-10 | Stop reason: SDUPTHER

## 2022-03-10 RX ORDER — ONDANSETRON 2 MG/ML
4 INJECTION INTRAMUSCULAR; INTRAVENOUS EVERY 6 HOURS PRN
Status: DISCONTINUED | OUTPATIENT
Start: 2022-03-10 | End: 2022-03-14 | Stop reason: HOSPADM

## 2022-03-10 RX ORDER — OXYCODONE HYDROCHLORIDE 5 MG/1
5 TABLET ORAL PRN
Status: DISCONTINUED | OUTPATIENT
Start: 2022-03-10 | End: 2022-03-10 | Stop reason: HOSPADM

## 2022-03-10 RX ORDER — PROPOFOL 10 MG/ML
INJECTION, EMULSION INTRAVENOUS CONTINUOUS PRN
Status: DISCONTINUED | OUTPATIENT
Start: 2022-03-10 | End: 2022-03-10 | Stop reason: SDUPTHER

## 2022-03-10 RX ORDER — FENTANYL CITRATE 50 UG/ML
INJECTION, SOLUTION INTRAMUSCULAR; INTRAVENOUS PRN
Status: DISCONTINUED | OUTPATIENT
Start: 2022-03-10 | End: 2022-03-10 | Stop reason: SDUPTHER

## 2022-03-10 RX ORDER — SODIUM CHLORIDE 9 MG/ML
1000 INJECTION, SOLUTION INTRAVENOUS CONTINUOUS
Status: ACTIVE | OUTPATIENT
Start: 2022-03-10 | End: 2022-03-10

## 2022-03-10 RX ORDER — SODIUM CHLORIDE 0.9 % (FLUSH) 0.9 %
10 SYRINGE (ML) INJECTION PRN
Status: DISCONTINUED | OUTPATIENT
Start: 2022-03-10 | End: 2022-03-14 | Stop reason: HOSPADM

## 2022-03-10 RX ORDER — CLINDAMYCIN PHOSPHATE 600 MG/50ML
600 INJECTION INTRAVENOUS EVERY 8 HOURS
Status: DISCONTINUED | OUTPATIENT
Start: 2022-03-10 | End: 2022-03-11

## 2022-03-10 RX ORDER — DIPHENHYDRAMINE HYDROCHLORIDE 50 MG/ML
12.5 INJECTION INTRAMUSCULAR; INTRAVENOUS
Status: DISCONTINUED | OUTPATIENT
Start: 2022-03-10 | End: 2022-03-10 | Stop reason: HOSPADM

## 2022-03-10 RX ORDER — BUPIVACAINE HYDROCHLORIDE AND EPINEPHRINE 5; 5 MG/ML; UG/ML
INJECTION, SOLUTION PERINEURAL PRN
Status: DISCONTINUED | OUTPATIENT
Start: 2022-03-10 | End: 2022-03-10 | Stop reason: ALTCHOICE

## 2022-03-10 RX ORDER — POTASSIUM CHLORIDE 7.45 MG/ML
10 INJECTION INTRAVENOUS PRN
Status: DISCONTINUED | OUTPATIENT
Start: 2022-03-10 | End: 2022-03-14

## 2022-03-10 RX ORDER — ACETAMINOPHEN 325 MG/1
650 TABLET ORAL EVERY 6 HOURS PRN
Status: DISCONTINUED | OUTPATIENT
Start: 2022-03-10 | End: 2022-03-14 | Stop reason: HOSPADM

## 2022-03-10 RX ORDER — DEXTROSE MONOHYDRATE 50 MG/ML
100 INJECTION, SOLUTION INTRAVENOUS PRN
Status: DISCONTINUED | OUTPATIENT
Start: 2022-03-10 | End: 2022-03-14 | Stop reason: HOSPADM

## 2022-03-10 RX ORDER — MAGNESIUM SULFATE IN WATER 40 MG/ML
2000 INJECTION, SOLUTION INTRAVENOUS PRN
Status: DISCONTINUED | OUTPATIENT
Start: 2022-03-10 | End: 2022-03-14

## 2022-03-10 RX ORDER — SODIUM CHLORIDE 9 MG/ML
25 INJECTION, SOLUTION INTRAVENOUS PRN
Status: DISCONTINUED | OUTPATIENT
Start: 2022-03-10 | End: 2022-03-10 | Stop reason: HOSPADM

## 2022-03-10 RX ORDER — OXYCODONE HYDROCHLORIDE 5 MG/1
5 TABLET ORAL EVERY 4 HOURS PRN
Status: DISCONTINUED | OUTPATIENT
Start: 2022-03-10 | End: 2022-03-14 | Stop reason: HOSPADM

## 2022-03-10 RX ORDER — SODIUM CHLORIDE 0.9 % (FLUSH) 0.9 %
5-40 SYRINGE (ML) INJECTION EVERY 12 HOURS SCHEDULED
Status: DISCONTINUED | OUTPATIENT
Start: 2022-03-10 | End: 2022-03-10 | Stop reason: HOSPADM

## 2022-03-10 RX ORDER — SODIUM CHLORIDE 0.9 % (FLUSH) 0.9 %
5-40 SYRINGE (ML) INJECTION PRN
Status: DISCONTINUED | OUTPATIENT
Start: 2022-03-10 | End: 2022-03-10 | Stop reason: HOSPADM

## 2022-03-10 RX ORDER — SODIUM CHLORIDE 0.9 % (FLUSH) 0.9 %
10 SYRINGE (ML) INJECTION EVERY 12 HOURS SCHEDULED
Status: DISCONTINUED | OUTPATIENT
Start: 2022-03-10 | End: 2022-03-14 | Stop reason: HOSPADM

## 2022-03-10 RX ADMIN — KETAMINE HYDROCHLORIDE 20 MG: 100 INJECTION INTRAMUSCULAR; INTRAVENOUS at 15:45

## 2022-03-10 RX ADMIN — VANCOMYCIN HYDROCHLORIDE 1250 MG: 10 INJECTION, POWDER, LYOPHILIZED, FOR SOLUTION INTRAVENOUS at 05:43

## 2022-03-10 RX ADMIN — SODIUM CHLORIDE 1000 ML: 9 INJECTION, SOLUTION INTRAVENOUS at 00:27

## 2022-03-10 RX ADMIN — FENTANYL CITRATE 50 MCG: 50 INJECTION INTRAMUSCULAR; INTRAVENOUS at 15:32

## 2022-03-10 RX ADMIN — SODIUM CHLORIDE, PRESERVATIVE FREE 10 ML: 5 INJECTION INTRAVENOUS at 20:27

## 2022-03-10 RX ADMIN — MIDAZOLAM HYDROCHLORIDE 2 MG: 2 INJECTION, SOLUTION INTRAMUSCULAR; INTRAVENOUS at 15:21

## 2022-03-10 RX ADMIN — TETANUS TOXOID, REDUCED DIPHTHERIA TOXOID AND ACELLULAR PERTUSSIS VACCINE, ADSORBED 0.5 ML: 5; 2.5; 8; 8; 2.5 SUSPENSION INTRAMUSCULAR at 03:12

## 2022-03-10 RX ADMIN — CLINDAMYCIN PHOSPHATE 600 MG: 600 INJECTION, SOLUTION INTRAVENOUS at 09:42

## 2022-03-10 RX ADMIN — CLINDAMYCIN PHOSPHATE 600 MG: 600 INJECTION, SOLUTION INTRAVENOUS at 00:34

## 2022-03-10 RX ADMIN — CLINDAMYCIN PHOSPHATE 600 MG: 600 INJECTION, SOLUTION INTRAVENOUS at 19:05

## 2022-03-10 RX ADMIN — Medication 1500 MG: at 20:37

## 2022-03-10 RX ADMIN — ACETAMINOPHEN 650 MG: 325 TABLET ORAL at 00:34

## 2022-03-10 RX ADMIN — ONDANSETRON 4 MG: 2 INJECTION INTRAMUSCULAR; INTRAVENOUS at 15:26

## 2022-03-10 RX ADMIN — SODIUM CHLORIDE: 9 INJECTION, SOLUTION INTRAVENOUS at 15:20

## 2022-03-10 RX ADMIN — FENTANYL CITRATE 50 MCG: 50 INJECTION INTRAMUSCULAR; INTRAVENOUS at 15:21

## 2022-03-10 RX ADMIN — ACETAMINOPHEN 650 MG: 325 TABLET ORAL at 13:59

## 2022-03-10 RX ADMIN — SODIUM CHLORIDE 5 ML: 9 INJECTION, SOLUTION INTRAVENOUS at 20:35

## 2022-03-10 RX ADMIN — MEROPENEM 1000 MG: 1 INJECTION, POWDER, FOR SOLUTION INTRAVENOUS at 18:10

## 2022-03-10 RX ADMIN — FENTANYL CITRATE 50 MCG: 50 INJECTION INTRAMUSCULAR; INTRAVENOUS at 15:59

## 2022-03-10 RX ADMIN — Medication 400 MG: at 02:32

## 2022-03-10 RX ADMIN — PROPOFOL 50 MCG/KG/MIN: 10 INJECTION, EMULSION INTRAVENOUS at 15:24

## 2022-03-10 RX ADMIN — KETAMINE HYDROCHLORIDE 30 MG: 100 INJECTION INTRAMUSCULAR; INTRAVENOUS at 15:38

## 2022-03-10 RX ADMIN — ACETAMINOPHEN 650 MG: 325 TABLET ORAL at 05:59

## 2022-03-10 RX ADMIN — LIDOCAINE HYDROCHLORIDE 100 MG: 20 INJECTION, SOLUTION INFILTRATION; PERINEURAL at 15:21

## 2022-03-10 RX ADMIN — MEROPENEM 1000 MG: 1 INJECTION, POWDER, FOR SOLUTION INTRAVENOUS at 00:29

## 2022-03-10 RX ADMIN — VANCOMYCIN HYDROCHLORIDE 1250 MG: 10 INJECTION, POWDER, LYOPHILIZED, FOR SOLUTION INTRAVENOUS at 12:40

## 2022-03-10 RX ADMIN — HYDROMORPHONE HYDROCHLORIDE 1 MG: 2 INJECTION, SOLUTION INTRAMUSCULAR; INTRAVENOUS; SUBCUTANEOUS at 18:32

## 2022-03-10 RX ADMIN — MEROPENEM 1000 MG: 1 INJECTION, POWDER, FOR SOLUTION INTRAVENOUS at 10:26

## 2022-03-10 ASSESSMENT — PULMONARY FUNCTION TESTS
PIF_VALUE: 0
PIF_VALUE: 1
PIF_VALUE: 0
PIF_VALUE: 1
PIF_VALUE: 0
PIF_VALUE: 1
PIF_VALUE: 0
PIF_VALUE: 2
PIF_VALUE: 0
PIF_VALUE: 1
PIF_VALUE: 0

## 2022-03-10 ASSESSMENT — PAIN SCALES - GENERAL
PAINLEVEL_OUTOF10: 3
PAINLEVEL_OUTOF10: 3
PAINLEVEL_OUTOF10: 5
PAINLEVEL_OUTOF10: 0
PAINLEVEL_OUTOF10: 3

## 2022-03-10 ASSESSMENT — PAIN SCALES - WONG BAKER: WONGBAKER_NUMERICALRESPONSE: 0

## 2022-03-10 NOTE — H&P
Hospital Medicine History & Physical      PCP: JACKIE Escamilla    Date of Admission: 3/9/2022    Date of Service: Pt seen/examined on 3/9/2022    Pt seen/examined face to face on and admitted as inpatient with expected LOS greater than two midnights due to medical therapy    Chief Complaint:    Chief Complaint   Patient presents with    Blister     \"infected blister on upper left leg\". Patient reports blister has been present for last 3 days and reports he \"popped it yesterday and now there is swelling everywhere\"        History Of Present Illness:      55 y.o. male who presented to [facility] [facility] with past medical history of GERD, hypertension, sleep apnea, class III obesity presented to the ED with worsening left groin swelling tenderness. Patient reported that he had left upper leg blister that he popped and then started developing swelling erythema edema tenderness progressively worsening no known alleviating or exacerbating factor no associated fever chills nausea vomiting chest pain shortness of abdominal pain dysuria. Patient reported that he continued to get worse but not limiting his ability to ambulate but due to this increased swelling and edema came to the ED. Patient denied having any IV injection. Does not remember what was his last tetanus shot. Patient denied recent hospitalization, IV drug use. Patient otherwise compliant with medication no history of diabetes. Past Medical History:          Diagnosis Date    Acid reflux     Hypertension     Sleep apnea        Past Surgical History:          Procedure Laterality Date    KNEE ARTHROSCOPY Right     WISDOM TOOTH EXTRACTION Bilateral        Medications Prior to Admission:      Prior to Admission medications    Medication Sig Start Date End Date Taking?  Authorizing Provider   amLODIPine (NORVASC) 5 MG tablet Take 1 tablet by mouth daily 1/13/22   JACKIE Escamilla   atenolol (TENORMIN) 100 MG tablet TAKE ONE TABLET BY MOUTH DAILY 1/13/22   JACKIE Weston   losartan (COZAAR) 50 MG tablet Take 1 tablet by mouth 2 times daily 1/13/22   JACKIE Weston   metFORMIN (GLUCOPHAGE-XR) 750 MG extended release tablet TAKE ONE TABLET BY MOUTH TWICE A DAY WITH MEALS 1/13/22   JACKIE Weston   blood glucose test strips (ASCENSIA AUTODISC VI;ONE TOUCH ULTRA TEST VI) strip Check glucose daily. DM 2 E11.9 1/13/22   JACKIE Weston   Lancets MISC Check glucose daily. DM 2 E11.65. 1/13/22   JACKIE Weston   Blood Pressure KIT 1 kit by Does not apply route daily 7/18/19   Yashira Stewart MD   Blood Glucose Monitoring Suppl (ACCU-CHEK ERLIN PLUS) w/Device KIT 1 Device by Does not apply route daily 10/25/18   JACKIE Weston       Allergies:  Patient has no known allergies. Social History:          TOBACCO:   reports that he has never smoked. He quit smokeless tobacco use about 12 years ago. ETOH:   reports current alcohol use of about 1.0 standard drink of alcohol per week. E-Cigarettes/Vaping Use     Questions Responses    E-Cigarette/Vaping Use Never Assessed    Start Date     Passive Exposure     Quit Date     Counseling Given     Comments             Family History:      Reviewed in detail, and noncontributory        Problem Relation Age of Onset    Stroke Maternal Grandfather        REVIEW OF SYSTEMS:     Constitutional:  No Fever, No Chills, No Night Sweats  ENT/Mouth:  No Nasal Congestion,  No Hoarseness, No new mouth lesion  Eyes:  No Eye Pain, No Redness, No Discharge  Cardiovascular:  No Chest Pain, No Orthopnea, No Palpitations  Respiratory:  No Cough, No Sputum, No Dyspnea  Gastrointestinal: No Vomiting, No Diarrhea, + abdominal pain  Genitourinary: No Urinary Frequency, No Hematuria, No Urinary pain  Musculoskeletal:  No worsening Arthralgias, + worsening Myalgias  Skin: + New Skin Lesions, + new skin rash  Neuro:  No new weakness, No new numbness.   Psych:  No suicial ideation, No Violence ideation    PHYSICAL EXAM PERFORMED:    BP (!) 194/102   Pulse 92   Temp 98.9 °F (37.2 °C) (Oral)   Resp 20   Ht 6' 4\" (1.93 m)   Wt (!) 380 lb (172.4 kg)   SpO2 94%   BMI 46.26 kg/m²     General appearance:  mild acute distress, appears older than stated age  HEENT:   atraumatic, sclera anicteric, Conjunctivae clear. Neck: Supple,Trachea midline, no goiter  Respiratory:minimal accessory muscle usage, Normal respiratory effort. Clear to auscultation, bilaterally without wheezing  Cardiovascular:  Regular rate and rhythm, capillary refill 2 seconds  Abdomen: Morbid obese, soft, non-tender, non-distended with normal bowel sounds. Musculoskeletal:  No clubbing, cyanosis. trace edema LE bilaterally. Skin: turgor normal.  No new rashes or lesions. Left groin tender edematous no open area noted. Minimal crepitus noted. No guarding to palpation  Neurologic: Alert and oriented x4, no new focal sensory/motor deficits. Labs:     Recent Labs     03/09/22 2000   WBC 12.9*   HGB 14.0   HCT 41.6        Recent Labs     03/09/22 2000   *   K 3.4*   CL 97*   CO2 24   BUN 15   CREATININE 0.8*   CALCIUM 8.7     Recent Labs     03/09/22 2000   AST 17   ALT 29   BILITOT 1.3*   ALKPHOS 69     No results for input(s): INR in the last 72 hours. No results for input(s): Meldon Greenberg in the last 72 hours. Urinalysis:    No results found for: [facility], [address] [place] [address] 27, [facility] 994    Radiology:     CXR: I have reviewed the CXR with the following interpretation:   No acute process  EKG:  I have reviewed the EKG with the following interpretation:   Pending  XR CHEST PORTABLE   Final Result   Stable appearing chest without acute cardiopulmonary process. CT ABDOMEN PELVIS W IV CONTRAST Additional Contrast? None   Final Result   Skin thickening and subcutaneous inflammatory stranding suggestive of   cellulitis.   Soft tissue gas is also noted along the area of cellulitis, for which underlying necrotizing fasciitis cannot be excluded. Associated   reactive lymphadenopathy is identified in the left inguinal region. Fatty infiltration of the liver. Otherwise no acute process. Findings were called to and discussed with Dr. Julisa Manuel in the emergency   department at 10:15 p.m. on 03/09/2022.              ASSESSMENT AND PLAN:    Active Hospital Problems    Diagnosis Date Noted    Necrotizing fasciitis ([facility] [address] [M72.6] 03/10/2022     Sepsis: Heart rate>90, leukocytosis secondary to left proximal leg groin cellulitis with subcu air  Blood cultures pending, chest x-ray, UA pending, will no lactic acidosis  Broad-spectrum antibiotic as below    Cellulitis suspected acute necrotizing fasciitis,with noted on CT subcu air  IV Vanco, meropenem, clindamycin  Surgery consulted, much appreciated    Hypertensive urgency:  IV as needed    Hypokalemia, replaced  Uncontrolled type 2 diabetes, last A1c 8.8: A1c pending, ISS      Diet: NPO except meds ordered    DVT Prophylaxis: Held  Dispo:   Expected LOS greater than two [place], DO

## 2022-03-10 NOTE — PROGRESS NOTES
4 Eyes Skin Assessment     The patient is being assess for  Admission    I agree that 2 RN's have performed a thorough Head to Toe Skin Assessment on the patient. ALL assessment sites listed below have been assessed. Areas assessed by both nurses:   [x]   Head, Face, and Ears   [x]   Shoulders, Back, and Chest  [x]   Arms, Elbows, and Hands   [x]   Coccyx, Sacrum, and Ischum  [x]   Legs, Feet, and Heels        Does the Patient have Skin Breakdown?   No         Erwin Prevention initiated:  NA   Wound Care Orders initiated:  NA      [facility] nurse consulted for Pressure Injury (Stage 3,4, Unstageable, DTI, NWPT, and Complex wounds):  No      Nurse 1 eSignature: Electronically signed by Edwin Valadez RN on 3/10/22 at 7:44 AM EST    **SHARE this note so that the co-signing nurse is able to place an eSignature**    Nurse 2 eSignature: Electronically signed by Angelic Watts RN on 3/10/22 at 7:45 AM EST

## 2022-03-10 NOTE — PROGRESS NOTES
Pt returned to floor in stable condition. VSS. Provided pt with menu and he was calling his dinner order in when writer left room. DSG with some old drainage on it but still intact. Pt talked to wife while Addy Cotter was in room. Pt stable when writer left. Talked with pt about FSBS monitoring and controlling blood sugar in terms of infection. Pt was not really receptive and stated he checks it at home sometimes and understands the importance but shrugged it off. Oriented pt to POC and pain regimen. Pt stated, \"I refuse to take oxy. I would rather have the dilaudid. \"

## 2022-03-10 NOTE — CONSULTS
Department of General Surgery Consult    PATIENT NAME: Conchis Mars   YOB: 1975    ADMISSION DATE: 3/9/2022  7:50 PM      TODAY'S DATE: 3/10/2022    Reason for Consult:  Left groin infection    Chief Complaint: pain    Requesting Physician:  Julien Sanches    HISTORY OF PRESENT ILLNESS:              The patient is a 55 y.o. male who presents with left groin pain. Reports popping a pimple at home three days ago. Mild drainage and then stopped but developed swelling, erythema and pain after this. No fevers or chills. Some improvement overnight with abx.     Past Medical History:        Diagnosis Date    Acid reflux     Hypertension     Sleep apnea        Past Surgical History:        Procedure Laterality Date    KNEE ARTHROSCOPY Right     WISDOM TOOTH EXTRACTION Bilateral        Current Medications:   Current Facility-Administered Medications: clindamycin (CLEOCIN) 600 mg in dextrose 5 % 50 mL IVPB, 600 mg, IntraVENous, Q8H  0.9 % sodium chloride infusion, 1,000 mL, IntraVENous, Continuous  sodium chloride flush 0.9 % injection 10 mL, 10 mL, IntraVENous, 2 times per day  sodium chloride flush 0.9 % injection 10 mL, 10 mL, IntraVENous, PRN  0.9 % sodium chloride infusion, 25 mL, IntraVENous, PRN  potassium chloride 10 mEq/100 mL IVPB (Peripheral Line), 10 mEq, IntraVENous, PRN  magnesium sulfate 2000 mg in 50 mL IVPB premix, 2,000 mg, IntraVENous, PRN  promethazine (PHENERGAN) tablet 12.5 mg, 12.5 mg, Oral, Q6H PRN **OR** ondansetron (ZOFRAN) injection 4 mg, 4 mg, IntraVENous, Q6H PRN  acetaminophen (TYLENOL) tablet 650 mg, 650 mg, Oral, Q6H PRN **OR** acetaminophen (TYLENOL) suppository 650 mg, 650 mg, Rectal, Q6H PRN  meropenem (MERREM) 1,000 mg in sodium chloride 0.9 % 100 mL IVPB (mini-bag), 1,000 mg, IntraVENous, Q8H  insulin lispro (HUMALOG) injection vial 0-6 Units, 0-6 Units, SubCUTAneous, Q4H  glucose (GLUTOSE) 40 % oral gel 15 g, 15 g, Oral, PRN  dextrose 50 % IV solution, 12.5 g, IntraVENous, PRN  glucagon (rDNA) injection 1 mg, 1 mg, IntraMUSCular, PRN  dextrose 5 % solution, 100 mL/hr, IntraVENous, PRN  labetalol (NORMODYNE;TRANDATE) injection 10 mg, 10 mg, IntraVENous, Q4H PRN  potassium bicarb-citric acid (EFFER-K) effervescent tablet 20 mEq, 20 mEq, Oral, Daily  vancomycin (VANCOCIN) 1,250 mg in dextrose 5 % 250 mL IVPB, 1,250 mg, IntraVENous, Q8H  Prior to Admission medications    Medication Sig Start Date End Date Taking? Authorizing Provider   amLODIPine (NORVASC) 5 MG tablet Take 1 tablet by mouth daily 1/13/22  Yes JACKIE Rolle   atenolol (TENORMIN) 100 MG tablet TAKE ONE TABLET BY MOUTH DAILY 1/13/22  Yes JACKIE Rolle   losartan (COZAAR) 50 MG tablet Take 1 tablet by mouth 2 times daily 1/13/22  Yes JACKIE Rolle   metFORMIN (GLUCOPHAGE-XR) 750 MG extended release tablet TAKE ONE TABLET BY MOUTH TWICE A DAY WITH MEALS 1/13/22   JACKIE Rolle   blood glucose test strips (ASCENSIA AUTODISC VI;ONE TOUCH ULTRA TEST VI) strip Check glucose daily. DM 2 E11.9 1/13/22   JACKIE Rolle   Lancets MISC Check glucose daily. DM 2 E11.65. 1/13/22   JACKIE Rolle   Blood Pressure KIT 1 kit by Does not apply route daily 7/18/19   Rahcna Vital MD   Blood Glucose Monitoring Suppl (ACCU-CHEK ERLIN PLUS) w/Device KIT 1 Device by Does not apply route daily 10/25/18   JACKIE Rolle        Allergies:  Patient has no known allergies. Social History:   Social History     Socioeconomic History    Marital status:      Spouse name: Not on file    Number of children: 1    Years of education: Not on file    Highest education level: Not on file   Occupational History    Occupation:    Tobacco Use    Smoking status: Never Smoker    Smokeless tobacco: Former User   Vaping Use    Vaping Use: Not on file   Substance and Sexual Activity    Alcohol use:  Yes     Alcohol/week: 1.0 standard drink     Types: 1 Shots of liquor per week     Comment: occasional    Drug use: No    Sexual activity: Yes     Partners: Female   Other Topics Concern    Not on file   Social History Narrative    Not on file     Social Determinants of Health     Financial Resource Strain: Low Risk     Difficulty of Paying Living Expenses: Not hard at all   Food Insecurity: No Food Insecurity    Worried About Running Out of Food in the Last Year: Never true    [address] in the Last Year: Never true   Transportation Needs: No Transportation Needs    Lack of Transportation (Medical): No    Lack of Transportation (Non-Medical): No   Physical Activity:     Days of Exercise per Week: Not on file    Minutes of Exercise per Session: Not on file   Stress:     Feeling of Stress : Not on file   Social Connections:     Frequency of Communication with Friends and Family: Not on file    Frequency of Social Gatherings with Friends and Family: Not on file    Attends [employer] Services: Not on file    Active Member of [address] [place] [address] [employer] or Organizations: Not on file    Attends Club or Organization Meetings: Not on file    Marital Status: Not on file   Intimate Partner Violence:     Fear of Current or Ex-Partner: Not on file    Emotionally Abused: Not on file    Physically Abused: Not on file    Sexually Abused: Not on file   Housing Stability:     Unable to Pay for Housing in the Last Year: Not on file    Number of [place] in the Last Year: Not on file    Unstable Housing in the Last Year: Not on file         Family History:        Problem Relation Age of Onset    Stroke Maternal Grandfather        REVIEW OF SYSTEMS:  CONSTITUTIONAL:  negative  HEENT:  negative  RESPIRATORY:  negative  CARDIOVASCULAR:  negative  GASTROINTESTINAL:  negative   GENITOURINARY:  negative  HEMATOLOGIC/LYMPHATIC:  negative  NEUROLOGICAL:  Negative  * All other ROS reviewed and negative.        PHYSICAL EXAM:  VITALS:  BP (!) 170/90   Pulse 95   Temp 98.2 °F (36.8 °C) (Oral)   Resp 16   Ht 6' 4\" (1.93 Surgery  [id number]

## 2022-03-10 NOTE — PROGRESS NOTES
blister   Neurologic:  Neurovascularly intact without any focal sensory/motor deficits. .  Psychiatric: Alert and oriented, thought content appropriate, normal insight  Capillary Refill: Brisk,3 seconds, normal   Peripheral Pulses: +2 palpable, equal bilaterally       Labs:   Recent Labs     03/09/22 2000   WBC 12.9*   HGB 14.0   HCT 41.6        Recent Labs     03/09/22 2000   *   K 3.4*   CL 97*   CO2 24   BUN 15   CREATININE 0.8*   CALCIUM 8.7     Recent Labs     03/09/22 2000   AST 17   ALT 29   BILITOT 1.3*   ALKPHOS 69     No results for input(s): INR in the last 72 hours. No results for input(s): Thersia Millers in the last 72 hours. Urinalysis:      Lab Results   Component Value Date    NITRU Negative 03/10/2022    BLOODU Negative 03/10/2022    SPECGRAV 1.010 03/10/2022    GLUCOSEU >=1000 03/10/2022       Radiology:  XR CHEST PORTABLE   Final Result   Stable appearing chest without acute cardiopulmonary process. CT ABDOMEN PELVIS W IV CONTRAST Additional Contrast? None   Final Result   Skin thickening and subcutaneous inflammatory stranding suggestive of   cellulitis. Soft tissue gas is also noted along the area of cellulitis, for   which underlying necrotizing fasciitis cannot be excluded. Associated   reactive lymphadenopathy is identified in the left inguinal region. Fatty infiltration of the liver. Otherwise no acute process. Findings were called to and discussed with Dr. Julisa Manuel in the emergency   department at 10:15 p.m. on 03/09/2022.                  Assessment/Plan:    Active Hospital Problems    Diagnosis     Necrotizing fasciitis ([address] [M72.6]     Cellulitis of groin [L03.314]        Sepsis: 2/2 left proximal leg groin cellulitis with subcu air, uspected acute necrotizing fasciitis  Blood cultures pending,    Broad-spectrum antibiotics   Surgery consulted, input  Appreciated  awaiting for I and D       Hypertensive urgency:resume home meds   IV as needed     Hypokalemia, replaced    Uncontrolled type 2 diabetes, last A1c 8.8: A1c pending, ISS, met formin on hold       DVT Prophylaxis: Lovenox   Diet: Diet NPO Exceptions are: Ice Chips, Sips of Water with Meds  Code Status: Full Code    PT/OT Eval Status:     Dispo - 2-3 days    Shauna Lo MD

## 2022-03-10 NOTE — PROGRESS NOTES
Arrived to [facility] consent verified, NPO since 1200 on 3/10, patient states he took atenolol from home medication this am at 1000.

## 2022-03-10 NOTE — ED NOTES
Pt resting in bed at this time no s/s of distress noted at this time.      Angelica Looney, [facility]  03/09/22 7252

## 2022-03-10 NOTE — CARE COORDINATION
CASE MANAGEMENT INITIAL ASSESSMENT    Reviewed chart and completed assessment with patient bedside   Explained Case Management role/services. Primary contact information: [address] :   Primary Decision Maker: Ketty Azul - Spouse - [phone]          Can this person be reached and be able to respond quickly, such as within a few minutes or hours? Yes    Admit date/status:03/10/2022 Inpatient   Diagnosis: Necrotizing fasciitis   Is this a Readmission?:  No      Insurance:[employer]    Precert required for SNF: yes       3 night stay required: No    Living arrangements, Adls, care needs, prior to admission: [address] , spouse will  at [address] at home: Denies     Services in the home and/or outpatient, prior to admission: Reports has dogs not able to have     PT/OT recs:NA, ambulating in [address] Notification (HEN):NA    Barriers to discharge: OR this afternoon     Plan/comments: Patient from home with spouse IPTA. Patient with INS, transport, family support, and PCP. Patient denies needs from CM at this time will follow for potential post op needs. NIGEL Hayes        ECOC on chart for MD signature

## 2022-03-10 NOTE — CONSULTS
Pharmacy Note  Vancomycin Consult    Werner Nicole is a 55 y.o. male started on Vancomycin for SSTI; consult received from Dr. Vinay Engle to manage therapy. Allergies:  Patient has no known allergies. Recent Labs     03/09/22 2000   CREATININE 0.8*     Recent Labs     03/09/22 2000   WBC 12.9*     Estimated Creatinine Clearance: 211 mL/min (A) (based on SCr of 0.8 mg/dL (L)). No intake or output data in the 24 hours ending 03/10/22 0341  Wt Readings from Last 1 Encounters:   03/10/22 (!) 424 lb 2.6 oz (192.4 kg)       Body mass index is 51.63 kg/m². Culture Date      Source                       Results      Loading dose (critically ill or in ICU, require dialysis or renal replacement therapy): Vancomycin 25 mg/kg IVPB x 1 (maximum 3000 mg). Maintenance dose: 15 mg/kg (maximum: 2000 mg/dose and 4500 mg/day) starting at the next dosing interval determined by renal function  Pulse dose: fluctuating renal function, HOLLY, ESRD   Goal Vancomycin trough: 10-20 mcg/mL   Goal Vancomycin AUC: 400-600     Assessment/Plan:  Will initiate Vancomycin 1250 mg IV every 8 hours. Calculated  mg/L.hr. Vancomycin trough/random ordered for 3/10 at 1830. Timing of trough level will be determined based on culture results, renal function, and clinical response. Thank you for the consult. Ellen Givens, PharmD    3/10/2022 3:43 AM    3/10  - vanc level = 10.5mcg ~ 18:00  - Predicted AUC at current dose =344, will increase dose to 1,500mg q8h   Regimen 1500 mg IV every 8 hours.   Start time 20:00 on 03/10/2022  Exposure targets  Exposure target   AUC24 (range)  400-600 mg/L.hr  Predictions  AUC24,ss 551 mg/L.hr  Probability of AUC24 > 400 97 %  Ctrough,ss 13.6 mg/L  Probability of Ctrough,ss > 20 0 %  Probability of nephrotoxicity (Lodise JUNE 2009) 9 %  - Recheck level on 3/12 at 10am.  Radha Buchanan/An. 3/10/22 7:11 PM EST

## 2022-03-10 NOTE — ED PROVIDER NOTES
EMERGENCY DEPARTMENT ENCOUNTER      This patient was seen and evaluated by the attending physician. Pt Name: Dain Edmonds  MRN: [id number]  [place] 1975  Date of evaluation: 3/9/2022  Provider: MICHELLE Yoon - CNP-C  PCP: JACKIE Rodriguez  ED Attending: Dr. Kenneth Saldaña    History provided by the patient. CHIEF COMPLAINT:     Chief Complaint   Patient presents with    Blister     \"infected blister on upper left leg\". Patient reports blister has been present for last 3 days and reports he \"popped it yesterday and now there is swelling everywhere\"       HISTORY OF PRESENT ILLNESS:      Dain Edmonds is a 55 y.o. male who presents to [facility] with complaints of an infected blister on his left upper leg. Patient states that for the last 3 days he had a blister on his left upper leg, states that he popped it yesterday and now is got worsening swelling and redness. Patient states that he is prediabetic, does take medication for it, has had no fevers. Does complain of pain, no significant drainage. He is here for further evaluation. Location:left leg  Quality:ache  Severity:8  Duration:a few days  Modifying factors:none noted    Nursing Notes were reviewed     REVIEW OF SYSTEMS:     Review of Systems  All systems, atotal of 10, are reviewed and negative except for those that were just noted in history present illness.         PAST MEDICAL HISTORY:     Past Medical History:   Diagnosis Date    Acid reflux     Hypertension     Sleep apnea          SURGICAL HISTORY:      Past Surgical History:   Procedure Laterality Date    KNEE ARTHROSCOPY Right     WISDOM TOOTH EXTRACTION Bilateral          CURRENT MEDICATIONS:       Current Discharge Medication List      CONTINUE these medications which have NOT CHANGED    Details   amLODIPine (NORVASC) 5 MG tablet Take 1 tablet by mouth daily  Qty: 90 tablet, Refills: 1    Associated Diagnoses: Essential hypertension      atenolol (TENORMIN) 100 MG tablet TAKE ONE TABLET BY MOUTH DAILY  Qty: 90 tablet, Refills: 1    Associated Diagnoses: Essential hypertension      losartan (COZAAR) 50 MG tablet Take 1 tablet by mouth 2 times daily  Qty: 180 tablet, Refills: 1    Associated Diagnoses: Essential hypertension      metFORMIN (GLUCOPHAGE-XR) 750 MG extended release tablet TAKE ONE TABLET BY MOUTH TWICE A DAY WITH MEALS  Qty: 180 tablet, Refills: 1    Associated Diagnoses: Type 2 diabetes mellitus without complication, with long-term current use of insulin ([facility])      blood glucose test strips (ASCENSIA AUTODISC VI;ONE TOUCH ULTRA TEST VI) strip Check glucose daily. DM 2 E11.9  Qty: 100 each, Refills: 3    Associated Diagnoses: Type 2 diabetes mellitus without complication, with long-term current use of insulin ([facility])      Lancets MISC Check glucose daily. DM 2 E11.65. Qty: 100 each, Refills: 1    Associated Diagnoses: Type 2 diabetes mellitus without complication, with long-term current use of insulin ([facility])      Blood Pressure KIT 1 kit by Does not apply route daily  Qty: 1 kit, Refills: 0    Comments: May substitute generic or equivalent based on insurance coverage or patient preference. Associated Diagnoses: Essential hypertension      Blood Glucose Monitoring Suppl (ACCU-CHEK ERLIN PLUS) w/Device KIT 1 Device by Does not apply route daily  Qty: 1 kit, Refills: 0    Associated Diagnoses: Type 2 diabetes mellitus without complication, with long-term current use of insulin ([facility])               ALLERGIES:    Patient has no known allergies.     FAMILY HISTORY:       Family History   Problem Relation Age of Onset    Stroke Maternal Grandfather           SOCIAL HISTORY:       Social History     Socioeconomic History    Marital status:      Spouse name: None    Number of children: 1    Years of education: None    Highest education level: None   Occupational History    Occupation:    Tobacco Use    Smoking status: Never Smoker    Smokeless tobacco: Former User   Vaping Use    Vaping Use: None   Substance and Sexual Activity    Alcohol use: Yes     Alcohol/week: 1.0 standard drink     Types: 1 Shots of liquor per week     Comment: occasional    Drug use: No    Sexual activity: Yes     Partners: Female   Other Topics Concern    None   Social History Narrative    None     Social Determinants of Health     Financial Resource Strain: Low Risk     Difficulty of Paying Living Expenses: Not hard at all   Food Insecurity: No Food Insecurity    Worried About Running Out of Food in the Last Year: Never true    [place] of Food in the Last Year: Never true   Transportation Needs: No Transportation Needs    Lack of Transportation (Medical): No    Lack of Transportation (Non-Medical):  No   Physical Activity:     Days of Exercise per Week: Not on file    Minutes of Exercise per Session: Not on file   Stress:     Feeling of Stress : Not on file   Social Connections:     Frequency of Communication with Friends and Family: Not on file    Frequency of Social Gatherings with Friends and Family: Not on file    Attends Synagogue Services: Not on file    Active Member of [address] [place] [address] or Organizations: Not on file    Attends Club or Organization Meetings: Not on file    Marital Status: Not on file   Intimate Partner Violence:     Fear of Current or Ex-Partner: Not on file    Emotionally Abused: Not on file    Physically Abused: Not on file    Sexually Abused: Not on file   Housing Stability:     Unable to Pay for Housing in the Last Year: Not on file    Number of [place] in the Last Year: Not on file    Unstable Housing in the Last Year: Not on file       SCREENINGS:   Frances Coma Scale  Eye Opening: Spontaneous  Best Verbal Response: Oriented  Best Motor Response: Obeys commands  Frances Coma Scale Score: 15        PHYSICAL EXAM:       ED Triage Vitals [03/09/22 1956]   BP Temp Temp Source Pulse Resp SpO2 Height Weight   (!) 155/63 98.9 °F (37.2 °C) Oral 91 20 96 % 6' 4\" (1.93 m) (!) 380 lb (172.4 kg)       Physical Exam    CONSTITUTIONAL: Awake and alert. Cooperative. Well-developed. Well-nourished. Non-toxic. No acute distress. Vitals:    03/10/22 0045 03/10/22 0115 03/10/22 0145 03/10/22 0212   BP: (!) 188/88 (!) 194/102     Pulse: 94 92     Resp: 18 20  18   Temp:       TempSrc:    Tympanic   SpO2: 96% 94%     Weight:   (!) 424 lb 2.6 oz (192.4 kg)    Height:         HENT: Normocephalic. Atraumatic. External ears normal, without discharge. TMs clear bilaterally. No nasal discharge. Oropharynx clear, no erythema. Mucous membranes moist.  EYES: Conjunctiva non-injected, no lid abnormalities noted. No scleral icterus. PERRL. EOM's grossly intact. Anterior chambers clear. NECK: Supple. Normal ROM. No meningismus. No thyroid tenderness or swelling noted. CARDIOVASCULAR: RRR. No Murmer. PULMONARY/CHEST WALL: Effort normal. No tachypnea. Lungs clear to ausculation. ABDOMEN: Normal BS. Soft. Nondistended. No tenderness to palpate. No guarding. No hernias noted. No splenomegaly. Back: Spine is midline. No ecchymosis. No crepitus on palpation. No obvious subluxation of vertebral column. No saddle anesthesia or evidence of cauda equina. /ANORECTAL: Not assessed  MUSKULOSKELETAL: Normal ROM. No acute deformities. No edema. No tenderness to palpate. SKIN: Warm and dry. There is a large amount of erythema and tenderness and warmth noted to skin folds in the left inguinal area. There is no significant drainage. No fluctuance. NEUROLOGICAL:  GCS 15. CN II-XII grossly intact. Strength is 5/5 in allextremities and sensation is intact. PSYCHIATRIC: Normal affect, normal insight and judgement. Alert andoriented x 3.         DIAGNOSTIC RESULTS:     LABS:    Results for orders placed or performed during the hospital encounter of 03/09/22   CBC with Auto Differential   Result Value Ref Range    WBC 12.9 (H) 4.0 - 11.0 K/uL    RBC 4.95 4.20 - 5.90 M/uL    Hemoglobin 14.0 13.5 - 17.5 g/dL    Hematocrit 41.6 40.5 - 52.5 %    MCV 83.9 80.0 - 100.0 fL    MCH 28.2 26.0 - 34.0 pg    MCHC 33.6 31.0 - 36.0 g/dL    RDW 13.5 12.4 - 15.4 %    Platelets 168 415 - 150 K/uL    MPV 6.8 5.0 - 10.5 fL    Neutrophils % 81.4 %    Lymphocytes % 9.6 %    Monocytes % 7.6 %    Eosinophils % 0.7 %    Basophils % 0.7 %    Neutrophils Absolute 10.5 (H) 1.7 - 7.7 K/uL    Lymphocytes Absolute 1.2 1.0 - 5.1 K/uL    Monocytes Absolute 1.0 0.0 - 1.3 K/uL    Eosinophils Absolute 0.1 0.0 - 0.6 K/uL    Basophils Absolute 0.1 0.0 - 0.2 K/uL   Comprehensive Metabolic Panel   Result Value Ref Range    Sodium 134 (L) 136 - 145 mmol/L    Potassium 3.4 (L) 3.5 - 5.1 mmol/L    Chloride 97 (L) 99 - 110 mmol/L    CO2 24 21 - 32 mmol/L    Anion Gap 13 3 - 16    Glucose 246 (H) 70 - 99 mg/dL    BUN 15 7 - 20 mg/dL    CREATININE 0.8 (L) 0.9 - 1.3 mg/dL    GFR Non-African American >60 >60    GFR African American >60 >60    Calcium 8.7 8.3 - 10.6 mg/dL    Total Protein 6.9 6.4 - 8.2 g/dL    Albumin 4.2 3.4 - 5.0 g/dL    Albumin/Globulin Ratio 1.6 1.1 - 2.2    Total Bilirubin 1.3 (H) 0.0 - 1.0 mg/dL    Alkaline Phosphatase 69 40 - 129 U/L    ALT 29 10 - 40 U/L    AST 17 15 - 37 U/L   Lactic Acid   Result Value Ref Range    Lactic Acid 1.4 0.4 - 2.0 mmol/L   Procalcitonin   Result Value Ref Range    Procalcitonin 0.39 (H) 0.00 - 0.15 ng/mL   Magnesium   Result Value Ref Range    Magnesium 1.90 1.80 - 2.40 mg/dL   POCT Glucose   Result Value Ref Range    POC Glucose 242 (H) 70 - 99 mg/dl    Performed on [facility]          RADIOLOGY:  All x-ray studies are viewed/reviewedby me. Formal interpretations per the radiologist are as follows:      XR CHEST PORTABLE   Final Result   Stable appearing chest without acute cardiopulmonary process. CT ABDOMEN PELVIS W IV CONTRAST Additional Contrast? None   Final Result   Skin thickening and subcutaneous inflammatory stranding suggestive of   cellulitis.   Soft tissue gas is also noted along the area of cellulitis, for   which underlying necrotizing fasciitis cannot be excluded. Associated   reactive lymphadenopathy is identified in the left inguinal region. Fatty infiltration of the liver. Otherwise no acute process. Findings were called to and discussed with Dr. Victor Manuel Louis in the emergency   department at 10:15 p.m. on 03/09/2022. EKG:  See EKG interpretation by an attending phsyician      PROCEDURES:   N/A    CRITICAL CARE TIME:   N/A    CONSULTS:  IP CONSULT TO GENERAL SURGERY  PHARMACY TO DOSE VANCOMYCIN      EMERGENCYDEPARTMENT COURSE and DIFFERENTIAL DIAGNOSIS/MDM:   Vitals:    Vitals:    03/10/22 0045 03/10/22 0115 03/10/22 0145 03/10/22 0212   BP: (!) 188/88 (!) 194/102     Pulse: 94 92     Resp: 18 20  18   Temp:       TempSrc:    Tympanic   SpO2: 96% 94%     Weight:   (!) 424 lb 2.6 oz (192.4 kg)    Height:             Patient was evaluated by both myself and DR. Khan    Patient presented to the emerged from today with complaints of an infection in his left groin. Patient with a large amount of erythema and tenderness. Patient had a leukocytosis of 12.9, glucose of 246, lactate was negative. Did do a CT which showed skin thickening and subcutaneous inflammatory stranding suggestive of cellulitis with a fairly extensive soft tissue gas noted. Patient started on IV antibiotics, I did consult general surgery, Dr. Kecia Rodriguez spoke with them and at this time patient would not go to the OR. Patient will be admitted to the hospital for further treatment. Patient laboratory studies, radiographic imaging, andassessment were all discussed with the patient and/or patient family. There was shared decision-making between myself, the attending physician, as well as the patient and/or their surrogate and we are all in agreement with admission.   There was an opportunity for questions and all questions were answered to the best of my ability and to the satisfaction of the patient and/or patient family. FINAL IMPRESSION:      1. Cellulitis of groin          DISPOSITION/PLAN:   DISPOSITIONAdmitted      PATIENT REFERRED TO:  No follow-up provider specified.     DISCHARGE MEDICATIONS:  Current Discharge Medication List                     (Please note that portions of this note were completed with a voice recognition program.  Efforts were made to edit the dictations, but occasionally words are mis-transcribed.)    MICHELLE Melendez CNP-C (electronically signed)        MICHELLE Melendez CNP  03/10/22 0327

## 2022-03-10 NOTE — ED PROVIDER NOTES
I independently performed a history and physical on Félix Johnson. All diagnostic, treatment, and disposition decisions were made by myself in conjunction with the advanced practice provider. I have participated in the medical decision making and directed the treatment plan and disposition of the patient. For further details of [facility] emergency department encounter, please see the advanced practice provider's documentation. CHIEF COMPLAINT  Chief Complaint   Patient presents with    Blister     \"infected blister on upper left leg\". Patient reports blister has been present for last 3 days and reports he \"popped it yesterday and now there is swelling everywhere\"       Briefly, Félix Johnson is a 55 y.o. male  who presents to the ED complaining of a \"blister\" in his left groin. FOCUSED PHYSICAL EXAMINATION  BP (!) 155/63   Pulse 91   Temp 98.9 °F (37.2 °C) (Oral)   Resp 20   Ht 6' 4\" (1.93 m)   Wt (!) 380 lb (172.4 kg)   SpO2 96%   BMI 46.26 kg/m²      Focused physical examination:  General appearance:  Cooperative. No acute distress. Skin:  Warm. Dry. Area of approximately 12 x 15 cm of erythema and warmth with mild induration to the left proximal thigh. 1 small less than 1 cm area of near skin eruption but no discharge or drainage to the lateral aspect of this erythema. No crepitus at the site  Eye:  Extraocular movements intact. Ears, nose, mouth and throat:  Oral mucosa moist,  Neck:  Trachea midline. Heart:  Regular rate and rhythm  Perfusion:  intact  Respiratory:  Lungs clear to auscultation bilaterally. Respirations nonlabored. Abdominal:   Non distended. Nontender  Neurological:  Alert and oriented x 3. Moves all extremities spontaneously  Musculoskeletal:   Normal ROM, no deformities          Psychiatric:  Normal mood    MDM: Patient presents today with an obvious cellulitis of the left leg. Patient is diabetic. Found to have leukocytosis.   Given concern for possible underlying abscess CT was performed which showed a fair amount of soft tissue gas. No obvious fluid collection. General surgery was will be consulted and the patient will be started on broad-spectrum antibiotics and admitted to the hospital.  Otherwise does not appear systemically toxic    During the patient's ED course, the patient was given:  Medications   vancomycin 1000 mg IVPB in 250 mL D5W addavial (1,000 mg IntraVENous New Bag 3/9/22 8834)   piperacillin-tazobactam (ZOSYN) 3,375 mg in dextrose 5 % 50 mL IVPB (mini-bag) (has no administration in time range)   iopamidol (ISOVUE-370) 76 % injection 75 mL (75 mLs IntraVENous Given 3/9/22 2131)        CLINICAL IMPRESSION  1. Cellulitis of groin        DISPOSITION  Admission      This chart was created using Dragon dictation software. Efforts were made by me to ensure accuracy, however some errors may be present due to limitations of this technology.             Dina Ang MD  03/09/22 8670

## 2022-03-10 NOTE — ED NOTES
Called General Surgery @ [facility] message @ 2031  Regarding: Cellulitis with gas  Per: Pineda Ngo, APRN - CNP    Dr. Jamelle Dakin [facility] Bul[facility]  03/10/22 0006

## 2022-03-10 NOTE — ANESTHESIA PRE PROCEDURE
Department of Anesthesiology  Preprocedure Note       Name:  Ildefonso Moreno   Age:  55 y.o.  :  1975                                          MRN:  [id number]         Date:  3/10/2022      Surgeon: Osorio Ruby):  Luis Armando Andrew MD    Procedure: Procedure(s):  LEFT GROIN INCISION AND DRAINAGE    Medications prior to admission:   Prior to Admission medications    Medication Sig Start Date End Date Taking? Authorizing Provider   amLODIPine (NORVASC) 5 MG tablet Take 1 tablet by mouth daily 22  Yes JACKIE Stock   atenolol (TENORMIN) 100 MG tablet TAKE ONE TABLET BY MOUTH DAILY 22  Yes JACKIE Stock   losartan (COZAAR) 50 MG tablet Take 1 tablet by mouth 2 times daily 22  Yes JACKIE Stock   metFORMIN (GLUCOPHAGE-XR) 750 MG extended release tablet TAKE ONE TABLET BY MOUTH TWICE A DAY WITH MEALS 22   JACKIE Stock   blood glucose test strips (ASCENSIA AUTODISC VI;ONE TOUCH ULTRA TEST VI) strip Check glucose daily. DM 2 E11.9 22   JACKIE Stock   Lancets MISC Check glucose daily.   DM 2 E11.65. 22   JACKIE Stock   Blood Pressure KIT 1 kit by Does not apply route daily 19   Parish Hernandez MD   Blood Glucose Monitoring Suppl (ACCU-CHEK ERLIN PLUS) w/Device KIT 1 Device by Does not apply route daily 10/25/18   JACKIE Stock       Current medications:    Current Facility-Administered Medications   Medication Dose Route Frequency Provider Last Rate Last Admin    clindamycin (CLEOCIN) 600 mg in dextrose 5 % 50 mL IVPB  600 mg IntraVENous Q8H Kyried NATHALIE Allen, DO   Stopped at 03/10/22 1026    sodium chloride flush 0.9 % injection 10 mL  10 mL IntraVENous 2 times per day Ean Allen DO        sodium chloride flush 0.9 % injection 10 mL  10 mL IntraVENous PRN Tyson Allen, DO        0.9 % sodium chloride infusion  25 mL IntraVENous PRN Jobymad NATHALIE Allen, DO        potassium chloride 10 mEq/100 mL IVPB (Peripheral Line)  10 mEq IntraVENous PRN [facility] Driscoll, DO        magnesium sulfate 2000 mg in 50 mL IVPB premix  2,000 mg IntraVENous PRN Bryan Halter Alejandro, DO        promethazine (PHENERGAN) tablet 12.5 mg  12.5 mg Oral Q6H PRN mad A Alejandro, DO        Or    ondansetron (ZOFRAN) injection 4 mg  4 mg IntraVENous Q6H PRN Bryan Halter Alejandro, DO        acetaminophen (TYLENOL) tablet 650 mg  650 mg Oral Q6H PRN mad A Alejandro, DO   650 mg at 03/10/22 0559    Or    acetaminophen (TYLENOL) suppository 650 mg  650 mg Rectal Q6H PRN Ahmad A Alejandro, DO        meropenem (MERREM) 1,000 mg in sodium chloride 0.9 % 100 mL IVPB (mini-bag)  1,000 mg IntraVENous Q8H Ahmad A Alejandro, DO   Stopped at 03/10/22 1110    insulin lispro (HUMALOG) injection vial 0-6 Units  0-6 Units SubCUTAneous Q4H Ahmad A Alejandro, DO        glucose (GLUTOSE) 40 % oral gel 15 g  15 g Oral [facility]        dextrose 50 % IV solution  12.5 g IntraVENous PRN Cristine Bonus A Alejadnro, DO        glucagon (rDNA) injection 1 mg  1 mg IntraMUSCular PRN [facility], [facility]        dextrose 5 % solution  100 mL/hr IntraVENous PRN [facility], [facility]        labetalol (NORMODYNE;TRANDATE) injection 10 mg  10 mg IntraVENous Q4H PRN mad A Alejandro, DO        potassium bicarb-citric acid (EFFER-K) effervescent tablet 20 mEq  20 mEq Oral Daily Ahmad A Alejandro, DO        vancomycin (VANCOCIN) 1,250 mg in dextrose 5 % 250 mL IVPB  1,250 mg IntraVENous Q8H Ahmad A Alejandro, .7 mL/hr at 03/10/22 1240 1,250 mg at 03/10/22 1240    amLODIPine (NORVASC) tablet 5 mg  5 mg Oral Daily Kavitha Bailon MD        atenolol (TENORMIN) tablet 50 mg  50 mg Oral Daily Kavitha Bailon MD        enoxaparin (LOVENOX) injection 40 mg  40 mg SubCUTAneous Daily Kavitha Bailon MD           Allergies:  No Known Allergies    Problem List:    Patient Active Problem List   Diagnosis Code    GERD (gastroesophageal reflux disease) K21.9    HTN (hypertension) I10    Severe obstructive sleep apnea G47.33    Morbid obesity with BMI of 50.0-59.9, adult ([facility]) E66.01, Z68.43    Pure hypercholesterolemia E78.00    Necrotizing fasciitis ([address] M72.6    Cellulitis of groin L03.314       Past Medical History:        Diagnosis Date    Acid reflux     Hypertension     Sleep apnea        Past Surgical History:        Procedure Laterality Date    KNEE ARTHROSCOPY Right     WISDOM TOOTH EXTRACTION Bilateral        Social History:    Social History     Tobacco Use    Smoking status: Never Smoker    Smokeless tobacco: Former User   Substance Use Topics    Alcohol use: Yes     Alcohol/week: 1.0 standard drink     Types: 1 Shots of liquor per week     Comment: occasional                                Counseling given: Not Answered      Vital Signs (Current):   Vitals:    03/10/22 0212 03/10/22 0215 03/10/22 0830 03/10/22 1106   BP:  (!) 150/84 (!) 170/90 (!) 170/97   Pulse:  94 95 90   Resp: 18 18 16 16   Temp:  98.2 °F (36.8 °C)  98.8 °F (37.1 °C)   TempSrc: Tympanic Oral  Oral   SpO2:   95% 94%   Weight:       Height:                                                  BP Readings from Last 3 Encounters:   03/10/22 (!) 170/97   02/06/22 (!) 219/87   06/18/21 134/88       NPO Status:                                                                                 BMI:   Wt Readings from Last 3 Encounters:   03/10/22 (!) 424 lb 2.6 oz (192.4 kg)   02/06/22 (!) 400 lb (181.4 kg)   06/18/21 (!) 428 lb (194.1 kg)     Body mass index is 51.63 kg/m².     CBC:   Lab Results   Component Value Date    WBC 12.9 03/09/2022    RBC 4.95 03/09/2022    HGB 14.0 03/09/2022    HCT 41.6 03/09/2022    MCV 83.9 03/09/2022    RDW 13.5 03/09/2022     03/09/2022       CMP:   Lab Results   Component Value Date     03/09/2022    K 3.4 03/09/2022    K 3.5 02/06/2022    CL 97 03/09/2022    CO2 24 03/09/2022    BUN 15 03/09/2022    CREATININE 0.8 03/09/2022    GFRAA >60 03/09/2022    AGRATIO 1.6 03/09/2022    LABGLOM >60 03/09/2022    GLUCOSE 246 03/09/2022    PROT 6.9 03/09/2022    CALCIUM 8.7 03/09/2022    BILITOT 1.3 03/09/2022    ALKPHOS 69 03/09/2022    AST 17 03/09/2022    ALT 29 03/09/2022       POC Tests:   Recent Labs     03/10/22  1104   POCGLU 211*       Coags: No results found for: PROTIME, INR, APTT    HCG (If Applicable): No results found for: PREGTESTUR, PREGSERUM, HCG, HCGQUANT     ABGs: No results found for: PHART, PO2ART, IIO7YSP, CZR4VHF, BEART, J2SHCHYH     Type & Screen (If Applicable):  No results found for: LABABO, LABRH    Drug/Infectious Status (If Applicable):  No results found for: HIV, HEPCAB    COVID-19 Screening (If Applicable):   Lab Results   Component Value Date    COVID19 Not Detected 03/10/2022    COVID19 Detected 02/26/2021           Anesthesia Evaluation  Patient summary reviewed and Nursing notes reviewed no history of anesthetic complications:   Airway: Mallampati: III     Neck ROM: full   Dental:          Pulmonary:Negative Pulmonary ROS and normal exam    (+) sleep apnea:                             Cardiovascular:Negative CV ROS    (+) hypertension:,                   Neuro/Psych:   Negative Neuro/Psych ROS              GI/Hepatic/Renal: Neg GI/Hepatic/Renal ROS  (+) GERD: well controlled,      (-) hiatal hernia       Endo/Other: Negative Endo/Other ROS                    Abdominal:             Vascular: Other Findings:           Anesthesia Plan      general     ASA 4     (I discussed with the patient the risks and benefits of PIV, general anesthesia, IV Narcotics, PACU. All questions were answered the patient agrees with the plan and wishes to proceed.  )  Induction: intravenous. Pre-Operative Diagnosis: Necrotizing fasciitis ([facility]) [M72.6]; Cellulitis of groin [Y76.487]    55 y.o.   BMI:  Body mass index is 51.63 kg/m².      Vitals:    03/10/22 0215 03/10/22 0830 03/10/22 1106 03/10/22 1415   BP: (!) 150/84 (!) 170/90 (!) 170/97 (!) 161/82   Pulse: 94 95 90 88   Resp: 18 16 16 18   Temp: 98.2 °F (36.8 °C)  98.8 °F (37.1 °C) 98.8 °F (37.1 °C)   TempSrc: Oral  Oral Oral   SpO2:  95% 94% 93%   Weight:       Height:           No Known Allergies    Social History     Tobacco Use    Smoking status: Never Smoker    Smokeless tobacco: Former User   Substance Use Topics    Alcohol use:  Yes     Alcohol/week: 1.0 standard drink     Types: 1 Shots of liquor per week     Comment: occasional       LABS:    CBC  Lab Results   Component Value Date/Time    WBC 12.9 (H) 03/09/2022 08:00 PM    HGB 14.0 03/09/2022 08:00 PM    HCT 41.6 03/09/2022 08:00 PM     03/09/2022 08:00 PM     RENAL  Lab Results   Component Value Date/Time     (L) 03/09/2022 08:00 PM    K 3.4 (L) 03/09/2022 08:00 PM    K 3.5 02/06/2022 08:40 PM    CL 97 (L) 03/09/2022 08:00 PM    CO2 24 03/09/2022 08:00 PM    BUN 15 03/09/2022 08:00 PM    CREATININE 0.8 (L) 03/09/2022 08:00 PM    GLUCOSE 246 (H) 03/09/2022 08:00 PM     COAGS  No results found for: PROTIME, INR, APTT    Jeanne Prakash MD   3/10/2022

## 2022-03-10 NOTE — PROGRESS NOTES
Patient to PACU from OR. Patient received Monitored Anesthesia Care. Report received from [employer] and [facility] Mckenzie. Patient with eyes closed and stable on 10L O2 Non-Rebreather mask and with oral airway in place. Patient without s/s of pain/distress noted when assessed. SCD's in place; ICE applied. VS obtained and filed. Will continue to monitor.

## 2022-03-10 NOTE — OP NOTE
Date of Surgery: 3/10/22    Preop Dx: Left groin soft tissue infection    Postop Dx: Left groin necrotizing soft tissue infection    Procedure:  Left groin debridement    Surgeon:  Barnett Bamberger    Assistant:      Anesthesia:  MAC, local    EBL:   <50ml    Specimen:  Wound cultures    Complications: none    Drains/Lines:  none    Indications:  56 yo with left groin infection with subcutaneous gas seen on imaging. Description:  Patient was given adequate description of the risks and rewards of the procedure, including bleeding, infection, need for other procedures and freely consented. He was given appropriate antibiotics and brought to the OR where MAC anesthesia was induced. He was placed in supine position. Prepped and draped in usual sterile fashion. Local anesthetic instilled. Over center of area of induration an incision was made with #10 blade. Blunt dissection deeper exposed a pocket of murky fluid and desiccated and necrotic fatty tissue. Cultures were taken. Excisional debridement of the necrotic fatty tissue was done sharply with cautery. The incision was lengthened both medial and lateral to allow for all visualized necrotic tissue to be debrided. This included skin, subcutaneous tissue and muscle fascia. There was also a hematoma under the location of prior \"popped pimple\" that was removed. The pulsavac  was then used throughout the wound cavity. At conclusion hemostasis was achieved with cautery. Packed with betadyne soaked kerlex gauze and covered with dry gauze and abd pad. Wound measured about [id number] at conclusion with some undermining inferomedially. All suture, sponge and instrument count correct times two at end of case. Transferred to PACU in stable condition.     Monique Bautista MD

## 2022-03-11 LAB
A/G RATIO: 1.5 (ref 1.1–2.2)
ALBUMIN SERPL-MCNC: 3.6 G/DL (ref 3.4–5)
ALP BLD-CCNC: 61 U/L (ref 40–129)
ALT SERPL-CCNC: 26 U/L (ref 10–40)
ANION GAP SERPL CALCULATED.3IONS-SCNC: 12 MMOL/L (ref 3–16)
AST SERPL-CCNC: 19 U/L (ref 15–37)
BASOPHILS ABSOLUTE: 0 K/UL (ref 0–0.2)
BASOPHILS RELATIVE PERCENT: 0.5 %
BILIRUB SERPL-MCNC: 1 MG/DL (ref 0–1)
BUN BLDV-MCNC: 15 MG/DL (ref 7–20)
CALCIUM SERPL-MCNC: 8 MG/DL (ref 8.3–10.6)
CHLORIDE BLD-SCNC: 98 MMOL/L (ref 99–110)
CO2: 27 MMOL/L (ref 21–32)
CREAT SERPL-MCNC: 1.1 MG/DL (ref 0.9–1.3)
EOSINOPHILS ABSOLUTE: 0.1 K/UL (ref 0–0.6)
EOSINOPHILS RELATIVE PERCENT: 0.6 %
GFR AFRICAN AMERICAN: >60
GFR NON-AFRICAN AMERICAN: >60
GLUCOSE BLD-MCNC: 177 MG/DL (ref 70–99)
GLUCOSE BLD-MCNC: 191 MG/DL (ref 70–99)
GLUCOSE BLD-MCNC: 191 MG/DL (ref 70–99)
GLUCOSE BLD-MCNC: 206 MG/DL (ref 70–99)
GLUCOSE BLD-MCNC: 235 MG/DL (ref 70–99)
HCT VFR BLD CALC: 37.2 % (ref 40.5–52.5)
HEMOGLOBIN: 12.4 G/DL (ref 13.5–17.5)
LYMPHOCYTES ABSOLUTE: 1.2 K/UL (ref 1–5.1)
LYMPHOCYTES RELATIVE PERCENT: 13.8 %
MCH RBC QN AUTO: 28.7 PG (ref 26–34)
MCHC RBC AUTO-ENTMCNC: 33.3 G/DL (ref 31–36)
MCV RBC AUTO: 86.1 FL (ref 80–100)
MONOCYTES ABSOLUTE: 1 K/UL (ref 0–1.3)
MONOCYTES RELATIVE PERCENT: 10.8 %
NEUTROPHILS ABSOLUTE: 6.7 K/UL (ref 1.7–7.7)
NEUTROPHILS RELATIVE PERCENT: 74.3 %
PDW BLD-RTO: 13.3 % (ref 12.4–15.4)
PERFORMED ON: ABNORMAL
PLATELET # BLD: 232 K/UL (ref 135–450)
PMV BLD AUTO: 6.8 FL (ref 5–10.5)
POTASSIUM REFLEX MAGNESIUM: 3.6 MMOL/L (ref 3.5–5.1)
RBC # BLD: 4.32 M/UL (ref 4.2–5.9)
SODIUM BLD-SCNC: 137 MMOL/L (ref 136–145)
TOTAL PROTEIN: 6 G/DL (ref 6.4–8.2)
WBC # BLD: 9 K/UL (ref 4–11)

## 2022-03-11 PROCEDURE — 99231 SBSQ HOSP IP/OBS SF/LOW 25: CPT | Performed by: SURGERY

## 2022-03-11 PROCEDURE — 2580000003 HC RX 258: Performed by: INTERNAL MEDICINE

## 2022-03-11 PROCEDURE — 36415 COLL VENOUS BLD VENIPUNCTURE: CPT

## 2022-03-11 PROCEDURE — 6360000002 HC RX W HCPCS: Performed by: SURGERY

## 2022-03-11 PROCEDURE — 2580000003 HC RX 258: Performed by: SURGERY

## 2022-03-11 PROCEDURE — 1200000000 HC SEMI PRIVATE

## 2022-03-11 PROCEDURE — 6370000000 HC RX 637 (ALT 250 FOR IP): Performed by: NURSE PRACTITIONER

## 2022-03-11 PROCEDURE — 6370000000 HC RX 637 (ALT 250 FOR IP): Performed by: SURGERY

## 2022-03-11 PROCEDURE — 2500000003 HC RX 250 WO HCPCS: Performed by: SURGERY

## 2022-03-11 PROCEDURE — 85025 COMPLETE CBC W/AUTO DIFF WBC: CPT

## 2022-03-11 PROCEDURE — 80053 COMPREHEN METABOLIC PANEL: CPT

## 2022-03-11 PROCEDURE — 6360000002 HC RX W HCPCS: Performed by: INTERNAL MEDICINE

## 2022-03-11 RX ORDER — AMLODIPINE BESYLATE 5 MG/1
5 TABLET ORAL ONCE
Status: COMPLETED | OUTPATIENT
Start: 2022-03-11 | End: 2022-03-11

## 2022-03-11 RX ADMIN — INSULIN LISPRO 3 UNITS: 100 INJECTION, SOLUTION INTRAVENOUS; SUBCUTANEOUS at 12:03

## 2022-03-11 RX ADMIN — HYDROMORPHONE HYDROCHLORIDE 1 MG: 2 INJECTION, SOLUTION INTRAMUSCULAR; INTRAVENOUS; SUBCUTANEOUS at 15:13

## 2022-03-11 RX ADMIN — OXYCODONE 10 MG: 5 TABLET ORAL at 20:33

## 2022-03-11 RX ADMIN — SODIUM CHLORIDE, PRESERVATIVE FREE 10 ML: 5 INJECTION INTRAVENOUS at 21:45

## 2022-03-11 RX ADMIN — HYDROMORPHONE HYDROCHLORIDE 1 MG: 2 INJECTION, SOLUTION INTRAMUSCULAR; INTRAVENOUS; SUBCUTANEOUS at 07:39

## 2022-03-11 RX ADMIN — AMLODIPINE BESYLATE 5 MG: 5 TABLET ORAL at 01:33

## 2022-03-11 RX ADMIN — CLINDAMYCIN PHOSPHATE 600 MG: 600 INJECTION, SOLUTION INTRAVENOUS at 03:30

## 2022-03-11 RX ADMIN — Medication 1500 MG: at 04:31

## 2022-03-11 RX ADMIN — ENOXAPARIN SODIUM 40 MG: 40 INJECTION SUBCUTANEOUS at 08:32

## 2022-03-11 RX ADMIN — MEROPENEM 1000 MG: 1 INJECTION, POWDER, FOR SOLUTION INTRAVENOUS at 01:39

## 2022-03-11 RX ADMIN — HYDROMORPHONE HYDROCHLORIDE 1 MG: 2 INJECTION, SOLUTION INTRAMUSCULAR; INTRAVENOUS; SUBCUTANEOUS at 10:03

## 2022-03-11 RX ADMIN — POTASSIUM BICARBONATE 20 MEQ: 782 TABLET, EFFERVESCENT ORAL at 08:39

## 2022-03-11 RX ADMIN — HYDROMORPHONE HYDROCHLORIDE 1 MG: 2 INJECTION, SOLUTION INTRAMUSCULAR; INTRAVENOUS; SUBCUTANEOUS at 03:34

## 2022-03-11 RX ADMIN — INSULIN LISPRO 3 UNITS: 100 INJECTION, SOLUTION INTRAVENOUS; SUBCUTANEOUS at 16:52

## 2022-03-11 RX ADMIN — MEROPENEM 1000 MG: 1 INJECTION, POWDER, FOR SOLUTION INTRAVENOUS at 17:40

## 2022-03-11 RX ADMIN — INSULIN LISPRO 6 UNITS: 100 INJECTION, SOLUTION INTRAVENOUS; SUBCUTANEOUS at 08:41

## 2022-03-11 RX ADMIN — ACETAMINOPHEN 650 MG: 325 TABLET ORAL at 00:06

## 2022-03-11 RX ADMIN — AMLODIPINE BESYLATE 5 MG: 5 TABLET ORAL at 20:21

## 2022-03-11 RX ADMIN — INSULIN LISPRO 2 UNITS: 100 INJECTION, SOLUTION INTRAVENOUS; SUBCUTANEOUS at 21:26

## 2022-03-11 RX ADMIN — Medication 1500 MG: at 20:22

## 2022-03-11 RX ADMIN — MEROPENEM 1000 MG: 1 INJECTION, POWDER, FOR SOLUTION INTRAVENOUS at 08:31

## 2022-03-11 RX ADMIN — ATENOLOL 50 MG: 25 TABLET ORAL at 08:35

## 2022-03-11 RX ADMIN — ACETAMINOPHEN 650 MG: 325 TABLET ORAL at 20:21

## 2022-03-11 RX ADMIN — Medication 1500 MG: at 12:10

## 2022-03-11 ASSESSMENT — PAIN SCALES - GENERAL
PAINLEVEL_OUTOF10: 3
PAINLEVEL_OUTOF10: 5
PAINLEVEL_OUTOF10: 7

## 2022-03-11 ASSESSMENT — PAIN DESCRIPTION - LOCATION
LOCATION: GROIN
LOCATION: HEAD

## 2022-03-11 ASSESSMENT — PAIN DESCRIPTION - PAIN TYPE
TYPE: ACUTE PAIN
TYPE: ACUTE PAIN

## 2022-03-11 ASSESSMENT — ENCOUNTER SYMPTOMS: TACHYPNEA: 1

## 2022-03-11 NOTE — PROGRESS NOTES
Patient without any complaints at this time. A/O x4. VSS. Tylenol given for increased temp @ 2354; effective-T down to 98.0. New orders noted to change POCT to [facility] and  d/t no longer NPO. New order noted for high sliding scale insulin--patient refusing all insulin. Educated on importance of controlling diabetes; verbalized understanding. New order noted this shift for one time dose of Amlodipine 5mg d/t SBP elevated and patient reports he takes amlodipine at night; effective. Patient had medication in bottles at bedside start of shift; locked in patient lockbox in room. Tele order  at 0014; removed tele. Offered oxycodone [id number] for pain; patient refused d/t fear of getting addicted. Patient took dilaudid instead. Educated on pain meds; verbalized understanding, continued to refuse oxycodone. Dressing on Left groin with old burgundy drainage.

## 2022-03-11 NOTE — PROGRESS NOTES
[facility] GENERAL SURGERY    Surgery Progress Note           POD # 1    PATIENT NAME: Vishnu Settler Jorge Alberto     TODAY'S DATE: 3/11/2022    INTERVAL HISTORY:    Pt with mild pain, fever last night. OBJECTIVE:   VITALS:  /85   Pulse 81   Temp 98.2 °F (36.8 °C) (Oral)   Resp 20   Ht 6' 4\" (1.93 m)   Wt (!) 424 lb 2.6 oz (192.4 kg)   SpO2 97%   BMI 51.63 kg/m²     INTAKE/OUTPUT:    I/O last 3 completed shifts: In: 3786.1 [P.O.:920; I.V.:982.8; IV Piggyback:1883.3]  Out: 2175 [Urine:2175]  I/O this shift:  In: -   Out: 425 [Urine:425]              CONSTITUTIONAL:  awake and alert  Left groin:  Indurated, some erythema, wound base with good granulation, no purulence    Data:  CBC: Recent Labs     03/09/22  2000 03/10/22  1818 03/11/22  0505   WBC 12.9* 13.1* 9.0   HGB 14.0 14.2 12.4*   HCT 41.6 42.1 37.2*    264 232     BMP:    Recent Labs     03/09/22 2000 03/11/22  0505   * 137   K 3.4* 3.6   CL 97* 98*   CO2 24 27   BUN 15 15   CREATININE 0.8* 1.1   GLUCOSE 246* 235*     Hepatic:   Recent Labs     03/09/22 2000 03/11/22  0505   AST 17 19   ALT 29 26   BILITOT 1.3* 1.0   ALKPHOS 69 61     Mag:      Recent Labs     03/09/22  2000 03/10/22  1819   MG 1.90 2.00      Phos:   No results for input(s): PHOS in the last 72 hours. INR: No results for input(s): INR in the last 72 hours. Radiology Review:  NA    ASSESSMENT AND PLAN:  55 y.o. male status post left groin debridement  1. Discussed operative findings with patient and need for debridement  2. Continue with wet to dry wound dressing changes  3. Continue with IV abx today while awaiting culture results. Would like to see edema/erythema improved and without fevers prior to discharge.          Electronically signed by Shasta Almeida MD

## 2022-03-11 NOTE — PLAN OF CARE
Problem: Pain:  Goal: Pain level will decrease  Description: Pain level will decrease  Outcome: Ongoing  Goal: Control of acute pain  Description: Control of acute pain  Outcome: Ongoing  Goal: Control of chronic pain  Description: Control of chronic pain  Outcome: Ongoing  Note: Pt c/o 7/10 pain, PRN analgesics given.

## 2022-03-11 NOTE — CARE COORDINATION
Hospital day 1: Patient on C3 re necrotizing fasciitis followed by IM and General surgery. Patient with pending cultures from I and D. Patient ct with IV ATB. Patient from home with spouse, Yolanda Minor. Patient declined [facility] on assessment re dogs within the home. SW will follow. NIGEL Nuñez

## 2022-03-11 NOTE — ANESTHESIA POSTPROCEDURE EVALUATION
Department of Anesthesiology  Postprocedure Note    Patient: Alejandro Rich  MRN: [id number]  YOB: 1975  Date of evaluation: 3/10/2022  Time:  8:19 PM     Procedure Summary     Date: 03/10/22 Room / Location: [address]    Anesthesia Start: 1520 Anesthesia Stop: 6388    Procedure: LEFT GROIN DEBRIDEMENT (Left Groin) Diagnosis:       Abscess of groin      (LEFT GROIN ABCESS)    Surgeons: Sienna Carolina MD Responsible Provider: Arturo Mcintosh MD    Anesthesia Type: general ASA Status: 4          Anesthesia Type: general    Hakan Phase I: Hakan Score: 4    Hakan Phase II:      Last vitals: Reviewed and per EMR flowsheets.        Anesthesia Post Evaluation    Comments: Postoperative Anesthesia Note    Name:    Alejandro Rich  MRN:      [id number]    Patient Vitals in the past 12 hrs:  03/10/22 1800, BP:133/84, Temp:99.3 °F (37.4 °C), Temp src:Oral, Pulse:85, Resp:17, SpO2:95 %  03/10/22 1740, BP:(!) 150/67, Pulse:82, Resp:19, SpO2:95 %  03/10/22 1735, BP:(!) 157/69, Pulse:80, Resp:21, SpO2:96 %  03/10/22 1730, BP:(!) 151/80, Pulse:81, Resp:16, SpO2:96 %  03/10/22 1725, BP:128/69, Pulse:81, Resp:20, SpO2:97 %  03/10/22 1720, BP:(!) 147/62, Pulse:82, Resp:20, SpO2:98 %  03/10/22 1715, Pulse:82, Resp:18, SpO2:96 %  03/10/22 1710, BP:(!) 134/58, Pulse:79, Resp:26, SpO2:95 %  03/10/22 1705, BP:(!) 104/49, Pulse:81, Resp:21, SpO2:94 %  03/10/22 1700, BP:135/60, Pulse:83, Resp:19, SpO2:94 %  03/10/22 1655, Pulse:79, Resp:22, SpO2:95 %  03/10/22 1650, BP:(!) 131/54, Pulse:78, Resp:26, SpO2:96 %  03/10/22 1645, BP:129/64, Pulse:71, Resp:16, SpO2:100 %  03/10/22 1641, BP:(!) 123/56, Temp:97.7 °F (36.5 °C), Temp src:Temporal, Pulse:71, Resp:15  03/10/22 1640, BP:(!) 123/56, Pulse:71, Resp:16, SpO2:98 %  03/10/22 1415, BP:(!) 161/82, Temp:98.8 °F (37.1 °C), Temp src:Oral, Pulse:88, Resp:18, SpO2:93 %  03/10/22 1106, BP:(!) 170/97, Temp:98.8 °F (37.1 °C), Temp src:Oral, Pulse:90, Resp:16, SpO2:94 %  03/10/22 0830, BP:(!) 170/90, Pulse:95, Resp:16, SpO2:95 %     LABS:    CBC  Lab Results       Component                Value               Date/Time                  WBC                      13.1 (H)            03/10/2022 06:18 PM        HGB                      14.2                03/10/2022 06:18 PM        HCT                      42.1                03/10/2022 06:18 PM        PLT                      264                 03/10/2022 06:18 PM   RENAL  Lab Results       Component                Value               Date/Time                  NA                       134 (L)             03/09/2022 08:00 PM        K                        3.4 (L)             03/09/2022 08:00 PM        K                        3.5                 02/06/2022 08:40 PM        CL                       97 (L)              03/09/2022 08:00 PM        CO2                      24                  03/09/2022 08:00 PM        BUN                      15                  03/09/2022 08:00 PM        CREATININE               0.8 (L)             03/09/2022 08:00 PM        GLUCOSE                  246 (H)             03/09/2022 08:00 PM   COAGS  No results found for: PROTIME, INR, APTT    Intake & Output:  @75DZEL@    Nausea & Vomiting:  No    Level of Consciousness:  Awake    Pain Assessment:  Adequate analgesia    Anesthesia Complications:  No apparent anesthetic complications    SUMMARY      Vital signs stable  OK to discharge from Stage I post anesthesia care.   Care transferred from Anesthesiology department on discharge from perioperative area

## 2022-03-11 NOTE — PROGRESS NOTES
Hospitalist Progress Note      PCP: JACKIE Chamberlain    Date of Admission: 3/9/2022    Chief Complaint: infected blister on upper left leg    Hospital Course:  h and p reviewed     Subjective: no new c/o   Pain , swelling and redness over the left groin   Pain is controlled   S/p debridement - 3/10      Medications:  Reviewed    Infusion Medications    sodium chloride Stopped (03/10/22 2037)    dextrose       Scheduled Medications    sodium chloride flush  10 mL IntraVENous 2 times per day    meropenem  1,000 mg IntraVENous Q8H    potassium bicarb-citric acid  20 mEq Oral Daily    amLODIPine  5 mg Oral Daily    atenolol  50 mg Oral Daily    enoxaparin  40 mg SubCUTAneous Daily    vancomycin  1,500 mg IntraVENous Q8H    insulin lispro  0-18 Units SubCUTAneous TID WC    insulin lispro  0-9 Units SubCUTAneous Nightly     PRN Meds: sodium chloride flush, sodium chloride, potassium chloride, magnesium sulfate, promethazine **OR** ondansetron, acetaminophen **OR** acetaminophen, glucose, dextrose, glucagon (rDNA), dextrose, labetalol, oxyCODONE **OR** oxyCODONE, HYDROmorphone      Intake/Output Summary (Last 24 hours) at 3/11/2022 1223  Last data filed at 3/11/2022 1210  Gross per 24 hour   Intake 3786.11 ml   Output 2350 ml   Net 1436.11 ml       Physical Exam Performed:    /85   Pulse 81   Temp 98.2 °F (36.8 °C) (Oral)   Resp 20   Ht 6' 4\" (1.93 m)   Wt (!) 424 lb 2.6 oz (192.4 kg)   SpO2 97%   BMI 51.63 kg/m²     General appearance: No apparent distress, appears stated age and cooperative. Obese   HEENT:  t. Conjunctivae/corneas clear. Neck: Supple, with full range of motion. No jugular venous distention. Trachea midline. Respiratory:  Normal respiratory effort. Clear to auscultation, bilaterally without Rales/Wheezes/Rhonchi. Cardiovascular: Regular rate and rhythm with normal S1/S2 without murmurs, rubs or gallops. Abdomen: Soft, non-tender, non-distended with normal bowel sounds. Obese   Musculoskeletal: No clubbing, cyanosis   bilaterally. Skin: Skin color, texture, turgor normal.  Left groin s/p I and D - dressed Neurologic:  Neurovascularly intact without any focal sensory/motor deficits. .  Psychiatric: Alert and oriented, thought content appropriate, normal insight  Capillary Refill: Brisk,3 seconds, normal   Peripheral Pulses: +2 palpable, equal bilaterally       Labs:   Recent Labs     03/09/22  2000 03/10/22  1818 03/11/22  0505   WBC 12.9* 13.1* 9.0   HGB 14.0 14.2 12.4*   HCT 41.6 42.1 37.2*    264 232     Recent Labs     03/09/22 2000 03/11/22  0505   * 137   K 3.4* 3.6   CL 97* 98*   CO2 24 27   BUN 15 15   CREATININE 0.8* 1.1   CALCIUM 8.7 8.0*     Recent Labs     03/09/22 2000 03/11/22  0505   AST 17 19   ALT 29 26   BILITOT 1.3* 1.0   ALKPHOS 69 61     No results for input(s): INR in the last 72 hours. No results for input(s): Meldon Greenberg in the last 72 hours. Urinalysis:      Lab Results   Component Value Date    NITRU Negative 03/10/2022    BLOODU Negative 03/10/2022    SPECGRAV 1.010 03/10/2022    GLUCOSEU >=1000 03/10/2022       Radiology:  XR CHEST PORTABLE   Final Result   Stable appearing chest without acute cardiopulmonary process. CT ABDOMEN PELVIS W IV CONTRAST Additional Contrast? None   Final Result   Skin thickening and subcutaneous inflammatory stranding suggestive of   cellulitis. Soft tissue gas is also noted along the area of cellulitis, for   which underlying necrotizing fasciitis cannot be excluded. Associated   reactive lymphadenopathy is identified in the left inguinal region. Fatty infiltration of the liver. Otherwise no acute process. Findings were called to and discussed with Dr. Augusto Greene in the emergency   department at 10:15 p.m. on 03/09/2022.                  Assessment/Plan:    Active Hospital Problems    Diagnosis     Necrotizing fasciitis ([facility] [address] [M72.6]     Cellulitis of groin [I84.628] Sepsis: 2/2 left proximal leg groin cellulitis  necrotomizing tissue infection   S/p debridement Blood cultures pending,    Broad-spectrum antibiotics   Surgery consulted, input  Appreciated  S/p  I and D  Awaiting for cultures        Hypertensive urgency: resolved   resumed home meds   IV as needed     Hypokalemia, replaced    Uncontrolled type 2 diabetes, last A1c 8.8: A1c   10. 9 , ISS, met formin on hold   Was on metformin at home - on hold   Resume   May benefit from adding one more on dc       DVT Prophylaxis: Lovenox   Diet: ADULT DIET;  Regular; 3 carb choices (45 gm/meal)  Code Status: Full Code    PT/OT Eval Status:     Dispo - 1-2 days    Madan Sebastian MD

## 2022-03-12 LAB
A/G RATIO: 1.2 (ref 1.1–2.2)
ALBUMIN SERPL-MCNC: 3.4 G/DL (ref 3.4–5)
ALP BLD-CCNC: 67 U/L (ref 40–129)
ALT SERPL-CCNC: 50 U/L (ref 10–40)
ANION GAP SERPL CALCULATED.3IONS-SCNC: 12 MMOL/L (ref 3–16)
AST SERPL-CCNC: 49 U/L (ref 15–37)
BASOPHILS ABSOLUTE: 0.1 K/UL (ref 0–0.2)
BASOPHILS RELATIVE PERCENT: 0.6 %
BILIRUB SERPL-MCNC: 0.9 MG/DL (ref 0–1)
BUN BLDV-MCNC: 16 MG/DL (ref 7–20)
CALCIUM SERPL-MCNC: 8.1 MG/DL (ref 8.3–10.6)
CHLORIDE BLD-SCNC: 97 MMOL/L (ref 99–110)
CO2: 26 MMOL/L (ref 21–32)
CREAT SERPL-MCNC: 1.1 MG/DL (ref 0.9–1.3)
EOSINOPHILS ABSOLUTE: 0.1 K/UL (ref 0–0.6)
EOSINOPHILS RELATIVE PERCENT: 1.7 %
GFR AFRICAN AMERICAN: >60
GFR NON-AFRICAN AMERICAN: >60
GLUCOSE BLD-MCNC: 174 MG/DL (ref 70–99)
GLUCOSE BLD-MCNC: 183 MG/DL (ref 70–99)
GLUCOSE BLD-MCNC: 190 MG/DL (ref 70–99)
HCT VFR BLD CALC: 35.6 % (ref 40.5–52.5)
HEMOGLOBIN: 11.9 G/DL (ref 13.5–17.5)
LYMPHOCYTES ABSOLUTE: 1.1 K/UL (ref 1–5.1)
LYMPHOCYTES RELATIVE PERCENT: 12 %
MAGNESIUM: 1.9 MG/DL (ref 1.8–2.4)
MCH RBC QN AUTO: 28.1 PG (ref 26–34)
MCHC RBC AUTO-ENTMCNC: 33.6 G/DL (ref 31–36)
MCV RBC AUTO: 83.7 FL (ref 80–100)
MONOCYTES ABSOLUTE: 0.9 K/UL (ref 0–1.3)
MONOCYTES RELATIVE PERCENT: 10.7 %
NEUTROPHILS ABSOLUTE: 6.6 K/UL (ref 1.7–7.7)
NEUTROPHILS RELATIVE PERCENT: 75 %
PDW BLD-RTO: 13.3 % (ref 12.4–15.4)
PERFORMED ON: ABNORMAL
PLATELET # BLD: 260 K/UL (ref 135–450)
PMV BLD AUTO: 6.9 FL (ref 5–10.5)
POTASSIUM REFLEX MAGNESIUM: 3.4 MMOL/L (ref 3.5–5.1)
RBC # BLD: 4.25 M/UL (ref 4.2–5.9)
SODIUM BLD-SCNC: 135 MMOL/L (ref 136–145)
TOTAL PROTEIN: 6.2 G/DL (ref 6.4–8.2)
VANCOMYCIN RANDOM: 17.7 UG/ML
WBC # BLD: 8.8 K/UL (ref 4–11)

## 2022-03-12 PROCEDURE — 6370000000 HC RX 637 (ALT 250 FOR IP): Performed by: HOSPITALIST

## 2022-03-12 PROCEDURE — 83735 ASSAY OF MAGNESIUM: CPT

## 2022-03-12 PROCEDURE — 6360000002 HC RX W HCPCS: Performed by: SURGERY

## 2022-03-12 PROCEDURE — 85025 COMPLETE CBC W/AUTO DIFF WBC: CPT

## 2022-03-12 PROCEDURE — 2580000003 HC RX 258: Performed by: SURGERY

## 2022-03-12 PROCEDURE — 6360000002 HC RX W HCPCS: Performed by: INTERNAL MEDICINE

## 2022-03-12 PROCEDURE — 6370000000 HC RX 637 (ALT 250 FOR IP): Performed by: SURGERY

## 2022-03-12 PROCEDURE — 2580000003 HC RX 258: Performed by: INTERNAL MEDICINE

## 2022-03-12 PROCEDURE — 99232 SBSQ HOSP IP/OBS MODERATE 35: CPT | Performed by: SURGERY

## 2022-03-12 PROCEDURE — 80053 COMPREHEN METABOLIC PANEL: CPT

## 2022-03-12 PROCEDURE — 6370000000 HC RX 637 (ALT 250 FOR IP): Performed by: NURSE PRACTITIONER

## 2022-03-12 PROCEDURE — 80202 ASSAY OF VANCOMYCIN: CPT

## 2022-03-12 PROCEDURE — 1200000000 HC SEMI PRIVATE

## 2022-03-12 PROCEDURE — 36415 COLL VENOUS BLD VENIPUNCTURE: CPT

## 2022-03-12 RX ORDER — KETOROLAC TROMETHAMINE 30 MG/ML
30 INJECTION, SOLUTION INTRAMUSCULAR; INTRAVENOUS EVERY 8 HOURS PRN
Status: DISCONTINUED | OUTPATIENT
Start: 2022-03-12 | End: 2022-03-14 | Stop reason: HOSPADM

## 2022-03-12 RX ORDER — POTASSIUM CHLORIDE 750 MG/1
40 TABLET, EXTENDED RELEASE ORAL ONCE
Status: COMPLETED | OUTPATIENT
Start: 2022-03-12 | End: 2022-03-12

## 2022-03-12 RX ORDER — INSULIN GLARGINE 100 [IU]/ML
5 INJECTION, SOLUTION SUBCUTANEOUS NIGHTLY
Status: DISCONTINUED | OUTPATIENT
Start: 2022-03-12 | End: 2022-03-13

## 2022-03-12 RX ADMIN — MEROPENEM 1000 MG: 1 INJECTION, POWDER, FOR SOLUTION INTRAVENOUS at 17:15

## 2022-03-12 RX ADMIN — Medication 1500 MG: at 12:47

## 2022-03-12 RX ADMIN — Medication 1500 MG: at 03:54

## 2022-03-12 RX ADMIN — POTASSIUM CHLORIDE 40 MEQ: 750 TABLET, FILM COATED, EXTENDED RELEASE ORAL at 11:27

## 2022-03-12 RX ADMIN — OXYCODONE 10 MG: 5 TABLET ORAL at 16:05

## 2022-03-12 RX ADMIN — HYDROMORPHONE HYDROCHLORIDE 1 MG: 2 INJECTION, SOLUTION INTRAMUSCULAR; INTRAVENOUS; SUBCUTANEOUS at 01:00

## 2022-03-12 RX ADMIN — ENOXAPARIN SODIUM 40 MG: 40 INJECTION SUBCUTANEOUS at 08:31

## 2022-03-12 RX ADMIN — SODIUM CHLORIDE, PRESERVATIVE FREE 10 ML: 5 INJECTION INTRAVENOUS at 21:27

## 2022-03-12 RX ADMIN — Medication 1500 MG: at 21:00

## 2022-03-12 RX ADMIN — POTASSIUM BICARBONATE 20 MEQ: 782 TABLET, EFFERVESCENT ORAL at 08:31

## 2022-03-12 RX ADMIN — INSULIN GLARGINE 5 UNITS: 100 INJECTION, SOLUTION SUBCUTANEOUS at 21:12

## 2022-03-12 RX ADMIN — MEROPENEM 1000 MG: 1 INJECTION, POWDER, FOR SOLUTION INTRAVENOUS at 01:13

## 2022-03-12 RX ADMIN — SODIUM CHLORIDE, PRESERVATIVE FREE 10 ML: 5 INJECTION INTRAVENOUS at 08:32

## 2022-03-12 RX ADMIN — INSULIN LISPRO 3 UNITS: 100 INJECTION, SOLUTION INTRAVENOUS; SUBCUTANEOUS at 17:11

## 2022-03-12 RX ADMIN — MEROPENEM 1000 MG: 1 INJECTION, POWDER, FOR SOLUTION INTRAVENOUS at 10:36

## 2022-03-12 RX ADMIN — ATENOLOL 50 MG: 25 TABLET ORAL at 08:31

## 2022-03-12 RX ADMIN — INSULIN LISPRO 2 UNITS: 100 INJECTION, SOLUTION INTRAVENOUS; SUBCUTANEOUS at 21:14

## 2022-03-12 RX ADMIN — HYDROMORPHONE HYDROCHLORIDE 1 MG: 2 INJECTION, SOLUTION INTRAMUSCULAR; INTRAVENOUS; SUBCUTANEOUS at 11:27

## 2022-03-12 RX ADMIN — ACETAMINOPHEN 650 MG: 325 TABLET ORAL at 03:56

## 2022-03-12 RX ADMIN — INSULIN LISPRO 3 UNITS: 100 INJECTION, SOLUTION INTRAVENOUS; SUBCUTANEOUS at 08:33

## 2022-03-12 RX ADMIN — AMLODIPINE BESYLATE 5 MG: 5 TABLET ORAL at 21:01

## 2022-03-12 RX ADMIN — OXYCODONE 10 MG: 5 TABLET ORAL at 05:32

## 2022-03-12 RX ADMIN — HYDROMORPHONE HYDROCHLORIDE 1 MG: 2 INJECTION, SOLUTION INTRAMUSCULAR; INTRAVENOUS; SUBCUTANEOUS at 21:12

## 2022-03-12 RX ADMIN — INSULIN LISPRO 3 UNITS: 100 INJECTION, SOLUTION INTRAVENOUS; SUBCUTANEOUS at 12:34

## 2022-03-12 ASSESSMENT — PAIN DESCRIPTION - PAIN TYPE
TYPE: ACUTE PAIN

## 2022-03-12 ASSESSMENT — PAIN SCALES - GENERAL
PAINLEVEL_OUTOF10: 4
PAINLEVEL_OUTOF10: 7
PAINLEVEL_OUTOF10: 8
PAINLEVEL_OUTOF10: 8
PAINLEVEL_OUTOF10: 5
PAINLEVEL_OUTOF10: 7
PAINLEVEL_OUTOF10: 3
PAINLEVEL_OUTOF10: 4
PAINLEVEL_OUTOF10: 5

## 2022-03-12 ASSESSMENT — PAIN DESCRIPTION - LOCATION
LOCATION: GROIN
LOCATION: HEAD

## 2022-03-12 NOTE — PROGRESS NOTES
[facility] GENERAL SURGERY DAILY PROGRESS NOTE    SUBJECTIVE: Awake, alert    OBJECTIVE: CURRENT VITALS:  /78   Pulse 84   Temp 98.1 °F (36.7 °C) (Oral)   Resp 18   Ht 6' 4\" (1.93 m)   Wt (!) 420 lb 1.6 oz (190.6 kg)   SpO2 92%   BMI 51.14 kg/m²          L groin wound clean. Granulation starting. No evident pus. Erythema persists medially and inferior.     LABS:    CBC: Recent Labs     03/10/22  1818 03/11/22  0505 03/12/22  0519   WBC 13.1* 9.0 8.8   RBC 5.04 4.32 4.25   HGB 14.2 12.4* 11.9*   HCT 42.1 37.2* 35.6*   MCV 83.7 86.1 83.7   RDW 13.2 13.3 13.3    232 260     BMP:   Recent Labs     03/09/22 2000 03/11/22  0505 03/12/22  0519   * 137 135*   K 3.4* 3.6 3.4*   CL 97* 98* 97*   CO2 24 27 26   BUN 15 15 16   CREATININE 0.8* 1.1 1.1     Recent Labs     03/09/22  2000 03/10/22  1819 03/12/22  0519   MG 1.90 2.00 1.90             ASSESSMENT:   POD 2 debridement necrotizing soft tissue infection L groin      PLAN:   IV antibiotics  Await cultures  Local wound care           Hieu Hoffman MD

## 2022-03-12 NOTE — PROGRESS NOTES
Hospitalist Progress Note      PCP: JACKIE Sutton    Date of Admission: 3/9/2022    Chief Complaint: infected blister on upper left leg       Hospital Course:     Subjective: Patient complains of left thigh wound pain dressing is being changed by surgeon, denies any chest pain or shortness of breath no nausea vomiting. Medications:  Reviewed    Infusion Medications    sodium chloride Stopped (03/10/22 2037)    dextrose       Scheduled Medications    potassium chloride  40 mEq Oral Once    metFORMIN  850 mg Oral BID WC    vancomycin  1,500 mg IntraVENous Q8H    sodium chloride flush  10 mL IntraVENous 2 times per day    meropenem  1,000 mg IntraVENous Q8H    potassium bicarb-citric acid  20 mEq Oral Daily    amLODIPine  5 mg Oral Daily    atenolol  50 mg Oral Daily    enoxaparin  40 mg SubCUTAneous Daily    insulin lispro  0-18 Units SubCUTAneous TID WC    insulin lispro  0-9 Units SubCUTAneous Nightly     PRN Meds: sodium chloride flush, sodium chloride, potassium chloride, magnesium sulfate, promethazine **OR** ondansetron, acetaminophen **OR** acetaminophen, glucose, dextrose, glucagon (rDNA), dextrose, labetalol, oxyCODONE **OR** oxyCODONE, HYDROmorphone      Intake/Output Summary (Last 24 hours) at 3/12/2022 0912  Last data filed at 3/12/2022 4465  Gross per 24 hour   Intake 1200 ml   Output 1625 ml   Net -425 ml       Physical Exam Performed:    /78   Pulse 84   Temp 98.1 °F (36.7 °C) (Oral)   Resp 18   Ht 6' 4\" (1.93 m)   Wt (!) 420 lb 1.6 oz (190.6 kg)   SpO2 92%   BMI 51.14 kg/m²     General appearance: No apparent distress, appears stated age and cooperative. HEENT: Pupils equal, round, and reactive to light. Conjunctivae/corneas clear. Neck: Supple, with full range of motion. No jugular venous distention. Trachea midline. Respiratory:  Normal respiratory effort. Clear to auscultation, bilaterally without Rales/Wheezes/Rhonchi.   Cardiovascular: Regular rate and rhythm with normal S1/S2 without murmurs, rubs or gallops. Abdomen: Soft, non-tender, non-distended with normal bowel sounds. Musculoskeletal: No clubbing, cyanosis or edema bilaterally. Full range of motion without deformity. Left thigh open wound, positive erythema. Skin: Skin color, texture, turgor normal.  No rashes or lesions. Neurologic:  Neurovascularly intact without any focal sensory/motor deficits. Cranial nerves: II-XII intact, grossly non-focal.  Psychiatric: Alert and oriented, thought content appropriate, normal insight  Capillary Refill: Brisk,3 seconds, normal   Peripheral Pulses: +2 palpable, equal bilaterally       Labs:   Recent Labs     03/10/22  1818 03/11/22  0505 03/12/22  0519   WBC 13.1* 9.0 8.8   HGB 14.2 12.4* 11.9*   HCT 42.1 37.2* 35.6*    232 260     Recent Labs     03/09/22 2000 03/11/22  0505 03/12/22  0519   * 137 135*   K 3.4* 3.6 3.4*   CL 97* 98* 97*   CO2 24 27 26   BUN 15 15 16   CREATININE 0.8* 1.1 1.1   CALCIUM 8.7 8.0* 8.1*     Recent Labs     03/09/22 2000 03/11/22  0505 03/12/22  0519   AST 17 19 49*   ALT 29 26 50*   BILITOT 1.3* 1.0 0.9   ALKPHOS 69 61 67     No results for input(s): INR in the last 72 hours. No results for input(s): Elivs Marcial in the last 72 hours. Urinalysis:      Lab Results   Component Value Date    NITRU Negative 03/10/2022    BLOODU Negative 03/10/2022    SPECGRAV 1.010 03/10/2022    GLUCOSEU >=1000 03/10/2022       Radiology:  XR CHEST PORTABLE   Final Result   Stable appearing chest without acute cardiopulmonary process. CT ABDOMEN PELVIS W IV CONTRAST Additional Contrast? None   Final Result   Skin thickening and subcutaneous inflammatory stranding suggestive of   cellulitis. Soft tissue gas is also noted along the area of cellulitis, for   which underlying necrotizing fasciitis cannot be excluded. Associated   reactive lymphadenopathy is identified in the left inguinal region.       Fatty infiltration of the liver. Otherwise no acute process. Findings were called to and discussed with Dr. Ld Kaur in the emergency   department at 10:15 p.m. on 03/09/2022. Assessment/Plan:    Active Hospital Problems    Diagnosis     Necrotizing fasciitis ([facility] [address] [M72.6]     Cellulitis of groin [J12.480]      1. This is a 59-year-old male admitted with a sepsis secondary left proximal groin cellulitis, necrotizing tissue infection is status post I&D, blood cultures pending, surgery consulted and following. 2.  Hypertensive urgency on admission resolved continue with home medication. 3.  Hypokalemia supplement. 4.  Diabetes mellitus type II,  continue with Lantus and sliding scale and Metformin hemoglobin A1c= 10.9 on 3/10/2022.  5.  Morbid obesity BMI 51.14    DVT Prophylaxis: Lovenox subcu  Diet: ADULT DIET;  Regular; 3 carb choices (45 gm/meal)  Code Status: Full Code    PT/OT Eval Status: Not indicated    Rosi Bernal MD

## 2022-03-12 NOTE — PROGRESS NOTES
Pt axo. Assessment completed as charted. Pt denies needs for interventions at this time. Dressing to L groin intact at this time. Will continue to monitor. Call light in reach.

## 2022-03-12 NOTE — PROGRESS NOTES
Notified Hospitalists IV went bad with Vancomycin infusing. [address] [facility],  [address] or Facility: A From: Meaghan Crowley RE: Torie Hoffman 1975 RM: [id number] IV went bad with Vancomycin infusing. It was a new IV with blood return prior to starting infusion. Site pink, painful and swollen. IV removed and ice applied. Notified pharmacy and they decreased rate for future infusions. Need Callback: NO CALLBACK REQ C3 ONCOLOGY    No new orders.

## 2022-03-13 LAB
ANAEROBIC CULTURE: ABNORMAL
ANION GAP SERPL CALCULATED.3IONS-SCNC: 11 MMOL/L (ref 3–16)
BUN BLDV-MCNC: 16 MG/DL (ref 7–20)
CALCIUM SERPL-MCNC: 8.5 MG/DL (ref 8.3–10.6)
CHLORIDE BLD-SCNC: 100 MMOL/L (ref 99–110)
CO2: 27 MMOL/L (ref 21–32)
CREAT SERPL-MCNC: 1.1 MG/DL (ref 0.9–1.3)
CULTURE SURGICAL: ABNORMAL
GFR AFRICAN AMERICAN: >60
GFR NON-AFRICAN AMERICAN: >60
GLUCOSE BLD-MCNC: 157 MG/DL (ref 70–99)
GLUCOSE BLD-MCNC: 165 MG/DL (ref 70–99)
GLUCOSE BLD-MCNC: 172 MG/DL (ref 70–99)
GLUCOSE BLD-MCNC: 195 MG/DL (ref 70–99)
GLUCOSE BLD-MCNC: 202 MG/DL (ref 70–99)
GRAM STAIN RESULT: ABNORMAL
HCT VFR BLD CALC: 39.3 % (ref 40.5–52.5)
HEMOGLOBIN: 12.9 G/DL (ref 13.5–17.5)
MCH RBC QN AUTO: 28.1 PG (ref 26–34)
MCHC RBC AUTO-ENTMCNC: 32.9 G/DL (ref 31–36)
MCV RBC AUTO: 85.4 FL (ref 80–100)
ORGANISM: ABNORMAL
PDW BLD-RTO: 13.3 % (ref 12.4–15.4)
PERFORMED ON: ABNORMAL
PLATELET # BLD: 240 K/UL (ref 135–450)
PMV BLD AUTO: 6.5 FL (ref 5–10.5)
POTASSIUM REFLEX MAGNESIUM: 3.8 MMOL/L (ref 3.5–5.1)
RBC # BLD: 4.6 M/UL (ref 4.2–5.9)
SODIUM BLD-SCNC: 138 MMOL/L (ref 136–145)
WBC # BLD: 6.2 K/UL (ref 4–11)

## 2022-03-13 PROCEDURE — 85027 COMPLETE CBC AUTOMATED: CPT

## 2022-03-13 PROCEDURE — 6370000000 HC RX 637 (ALT 250 FOR IP): Performed by: SURGERY

## 2022-03-13 PROCEDURE — 2580000003 HC RX 258: Performed by: INTERNAL MEDICINE

## 2022-03-13 PROCEDURE — 6360000002 HC RX W HCPCS: Performed by: SURGERY

## 2022-03-13 PROCEDURE — 99221 1ST HOSP IP/OBS SF/LOW 40: CPT | Performed by: INTERNAL MEDICINE

## 2022-03-13 PROCEDURE — 2580000003 HC RX 258: Performed by: SURGERY

## 2022-03-13 PROCEDURE — 1200000000 HC SEMI PRIVATE

## 2022-03-13 PROCEDURE — 80048 BASIC METABOLIC PNL TOTAL CA: CPT

## 2022-03-13 PROCEDURE — 02HV33Z INSERTION OF INFUSION DEVICE INTO SUPERIOR VENA CAVA, PERCUTANEOUS APPROACH: ICD-10-PCS | Performed by: SURGERY

## 2022-03-13 PROCEDURE — 6360000002 HC RX W HCPCS: Performed by: INTERNAL MEDICINE

## 2022-03-13 PROCEDURE — 6370000000 HC RX 637 (ALT 250 FOR IP): Performed by: HOSPITALIST

## 2022-03-13 PROCEDURE — 6370000000 HC RX 637 (ALT 250 FOR IP): Performed by: NURSE PRACTITIONER

## 2022-03-13 PROCEDURE — 36415 COLL VENOUS BLD VENIPUNCTURE: CPT

## 2022-03-13 PROCEDURE — 76937 US GUIDE VASCULAR ACCESS: CPT

## 2022-03-13 PROCEDURE — 87641 MR-STAPH DNA AMP PROBE: CPT

## 2022-03-13 PROCEDURE — 99232 SBSQ HOSP IP/OBS MODERATE 35: CPT | Performed by: SURGERY

## 2022-03-13 PROCEDURE — C1751 CATH, INF, PER/CENT/MIDLINE: HCPCS

## 2022-03-13 RX ORDER — INSULIN GLARGINE 100 [IU]/ML
10 INJECTION, SOLUTION SUBCUTANEOUS NIGHTLY
Status: DISCONTINUED | OUTPATIENT
Start: 2022-03-13 | End: 2022-03-14 | Stop reason: HOSPADM

## 2022-03-13 RX ORDER — ATENOLOL 25 MG/1
50 TABLET ORAL ONCE
Status: COMPLETED | OUTPATIENT
Start: 2022-03-13 | End: 2022-03-13

## 2022-03-13 RX ORDER — AMLODIPINE BESYLATE 5 MG/1
5 TABLET ORAL NIGHTLY
Status: DISCONTINUED | OUTPATIENT
Start: 2022-03-13 | End: 2022-03-14 | Stop reason: HOSPADM

## 2022-03-13 RX ORDER — LOSARTAN POTASSIUM 25 MG/1
50 TABLET ORAL 2 TIMES DAILY
Status: DISCONTINUED | OUTPATIENT
Start: 2022-03-13 | End: 2022-03-14 | Stop reason: HOSPADM

## 2022-03-13 RX ORDER — ATENOLOL 25 MG/1
100 TABLET ORAL DAILY
Status: DISCONTINUED | OUTPATIENT
Start: 2022-03-14 | End: 2022-03-14 | Stop reason: HOSPADM

## 2022-03-13 RX ADMIN — INSULIN LISPRO 3 UNITS: 100 INJECTION, SOLUTION INTRAVENOUS; SUBCUTANEOUS at 08:50

## 2022-03-13 RX ADMIN — AMPICILLIN SODIUM AND SULBACTAM SODIUM 3000 MG: 2; 1 INJECTION, POWDER, FOR SOLUTION INTRAMUSCULAR; INTRAVENOUS at 13:57

## 2022-03-13 RX ADMIN — INSULIN GLARGINE 10 UNITS: 100 INJECTION, SOLUTION SUBCUTANEOUS at 20:47

## 2022-03-13 RX ADMIN — LOSARTAN POTASSIUM 50 MG: 25 TABLET, FILM COATED ORAL at 16:29

## 2022-03-13 RX ADMIN — INSULIN LISPRO 3 UNITS: 100 INJECTION, SOLUTION INTRAVENOUS; SUBCUTANEOUS at 16:30

## 2022-03-13 RX ADMIN — ENOXAPARIN SODIUM 40 MG: 40 INJECTION SUBCUTANEOUS at 08:49

## 2022-03-13 RX ADMIN — SODIUM CHLORIDE, PRESERVATIVE FREE 10 ML: 5 INJECTION INTRAVENOUS at 08:50

## 2022-03-13 RX ADMIN — INSULIN LISPRO 3 UNITS: 100 INJECTION, SOLUTION INTRAVENOUS; SUBCUTANEOUS at 12:51

## 2022-03-13 RX ADMIN — INSULIN LISPRO 2 UNITS: 100 INJECTION, SOLUTION INTRAVENOUS; SUBCUTANEOUS at 20:50

## 2022-03-13 RX ADMIN — MEROPENEM 1000 MG: 1 INJECTION, POWDER, FOR SOLUTION INTRAVENOUS at 03:24

## 2022-03-13 RX ADMIN — SODIUM CHLORIDE, PRESERVATIVE FREE 10 ML: 5 INJECTION INTRAVENOUS at 22:14

## 2022-03-13 RX ADMIN — ATENOLOL 50 MG: 25 TABLET ORAL at 08:49

## 2022-03-13 RX ADMIN — HYDROMORPHONE HYDROCHLORIDE 1 MG: 2 INJECTION, SOLUTION INTRAMUSCULAR; INTRAVENOUS; SUBCUTANEOUS at 17:44

## 2022-03-13 RX ADMIN — ATENOLOL 50 MG: 25 TABLET ORAL at 16:29

## 2022-03-13 RX ADMIN — AMPICILLIN SODIUM AND SULBACTAM SODIUM 3000 MG: 2; 1 INJECTION, POWDER, FOR SOLUTION INTRAMUSCULAR; INTRAVENOUS at 20:46

## 2022-03-13 RX ADMIN — AMLODIPINE BESYLATE 5 MG: 5 TABLET ORAL at 20:41

## 2022-03-13 RX ADMIN — KETOROLAC TROMETHAMINE 30 MG: 30 INJECTION, SOLUTION INTRAMUSCULAR at 03:32

## 2022-03-13 RX ADMIN — MEROPENEM 1000 MG: 1 INJECTION, POWDER, FOR SOLUTION INTRAVENOUS at 11:35

## 2022-03-13 RX ADMIN — Medication 1500 MG: at 04:41

## 2022-03-13 ASSESSMENT — PAIN SCALES - GENERAL
PAINLEVEL_OUTOF10: 5
PAINLEVEL_OUTOF10: 5
PAINLEVEL_OUTOF10: 0

## 2022-03-13 ASSESSMENT — PAIN DESCRIPTION - PAIN TYPE: TYPE: ACUTE PAIN

## 2022-03-13 NOTE — PROGRESS NOTES
Hospitalist Progress Note      PCP: JACKIE Dumont    Date of Admission: 3/9/2022    Chief Complaint:  infected blister on upper left leg       Hospital Course: This is a 59-year-old male with a past medical history of diabetes mellitus type 2 uncontrolled, hypertension, morbid obesity admitted with left thigh cellulitis, necrotizing tissue infection surgery consulted is status post I&D, wound packing on meropenem and vancomycin, culture positive for beta strep group B, infectious disease consulted antibiotic changed to Unasyn, blood cultures so far negative. Subjective: Patient denies any chest pain no shortness of breath no nausea vomiting anxious to be discharged home. Per nursing staff patient is noncompliant with the Metformin due to diarrhea. Per wife not interested in taking GLP-1 inhibitor.       Medications:  Reviewed    Infusion Medications    sodium chloride Stopped (03/10/22 2037)    dextrose       Scheduled Medications    vancomycin  1,250 mg IntraVENous Q8H    insulin glargine  5 Units SubCUTAneous Nightly    metFORMIN  850 mg Oral BID WC    sodium chloride flush  10 mL IntraVENous 2 times per day    meropenem  1,000 mg IntraVENous Q8H    potassium bicarb-citric acid  20 mEq Oral Daily    amLODIPine  5 mg Oral Daily    atenolol  50 mg Oral Daily    enoxaparin  40 mg SubCUTAneous Daily    insulin lispro  0-18 Units SubCUTAneous TID WC    insulin lispro  0-9 Units SubCUTAneous Nightly     PRN Meds: ketorolac, sodium chloride flush, sodium chloride, potassium chloride, magnesium sulfate, promethazine **OR** ondansetron, acetaminophen **OR** acetaminophen, glucose, dextrose, glucagon (rDNA), dextrose, labetalol, oxyCODONE **OR** oxyCODONE, HYDROmorphone      Intake/Output Summary (Last 24 hours) at 3/13/2022 0959  Last data filed at 3/12/2022 1600  Gross per 24 hour   Intake --   Output 750 ml   Net -750 ml       Physical Exam Performed:    BP (!) 153/87   Pulse 80   Temp 97.7 °F (36.5 °C) (Oral)   Resp 18   Ht 6' 4\" (1.93 m)   Wt (!) 420 lb 1.6 oz (190.6 kg)   SpO2 97%   BMI 51.14 kg/m²     General appearance: No apparent distress, appears stated age and cooperative. HEENT: Pupils equal, round, and reactive to light. Conjunctivae/corneas clear. Neck: Supple, with full range of motion. No jugular venous distention. Trachea midline. Respiratory:  Normal respiratory effort. Clear to auscultation, bilaterally without Rales/Wheezes/Rhonchi. Cardiovascular: Regular rate and rhythm with normal S1/S2 without murmurs, rubs or gallops. Abdomen: Soft, non-tender, non-distended with normal bowel sounds. Musculoskeletal: No clubbing, cyanosis or edema bilaterally. Full range of motion without deformity. Left thigh positive erythema  Skin: Skin color, texture, turgor normal.  No rashes or lesions. Neurologic:  Neurovascularly intact without any focal sensory/motor deficits. Cranial nerves: II-XII intact, grossly non-focal.  Psychiatric: Alert and oriented, thought content appropriate, normal insight  Capillary Refill: Brisk,3 seconds, normal   Peripheral Pulses: +2 palpable, equal bilaterally       Labs:   Recent Labs     03/11/22  0505 03/12/22  0519 03/13/22  0631   WBC 9.0 8.8 6.2   HGB 12.4* 11.9* 12.9*   HCT 37.2* 35.6* 39.3*    260 240     Recent Labs     03/11/22  0505 03/12/22  0519 03/13/22  0631    135* 138   K 3.6 3.4* 3.8   CL 98* 97* 100   CO2 27 26 27   BUN 15 16 16   CREATININE 1.1 1.1 1.1   CALCIUM 8.0* 8.1* 8.5     Recent Labs     03/11/22  0505 03/12/22  0519   AST 19 49*   ALT 26 50*   BILITOT 1.0 0.9   ALKPHOS 61 67     No results for input(s): INR in the last 72 hours. No results for input(s): Alber Sleeper in the last 72 hours.     Urinalysis:      Lab Results   Component Value Date    NITRU Negative 03/10/2022    BLOODU Negative 03/10/2022    SPECGRAV 1.010 03/10/2022    GLUCOSEU >=1000 03/10/2022       Radiology:  XR CHEST PORTABLE   Final Result Stable appearing chest without acute cardiopulmonary process. CT ABDOMEN PELVIS W IV CONTRAST Additional Contrast? None   Final Result   Skin thickening and subcutaneous inflammatory stranding suggestive of   cellulitis. Soft tissue gas is also noted along the area of cellulitis, for   which underlying necrotizing fasciitis cannot be excluded. Associated   reactive lymphadenopathy is identified in the left inguinal region. Fatty infiltration of the liver. Otherwise no acute process. Findings were called to and discussed with Dr. Ildefonso Kate in the emergency   department at 10:15 p.m. on 03/09/2022. Assessment/Plan:    Active Hospital Problems    Diagnosis     Necrotizing fasciitis ([address] [M72.6]     Cellulitis of groin [X33.334]         1. This is a 49-year-old male admitted with a sepsis secondary left proximal groin cellulitis, necrotizing tissue infection is status post I&D, blood cultures so far negative wound culture positive for group B beta strep, infectious disease consult appreciated meropenem and vancomycin has been discontinued started on Unasyn. 2.  Hypertensive urgency on admission resolved continue with home medication. 3.  Hypokalemia status post supplement resolved  4. Diabetes mellitus type II,  will increase the basal insulin continue with sliding scale and Metformin hemoglobin A1c= 10.9 on 3/10/2022.  5.  Morbid obesity BMI 51.14       DVT Prophylaxis: Lovenox subcu  Diet: ADULT DIET;  Regular; 3 carb choices (45 gm/meal)  Code Status: Full Code    PT/OT Eval Status: Not indicated    Miriam Galvez MD

## 2022-03-13 NOTE — PROGRESS NOTES
Notified Shiloh Hinkle MD via [employer] of poor IV access and multiple lost IV's over the last 24 hours. Will consult PICC. [address]  or Facility: [facility] From: Richelle Lujan RE: Constantin Bella 1975 RM: 46 Lost IV access. Pt is a difficult stick and has had 5 IV's go bad in the last 24 hours due to poor access. Pt getting IV Vanco and Merrem for improving necrotizing fasciitis to the groin. Not sure when will be discharged. What would you like me to do? Continue to try for peripheral access or PICC consult?  Need Callback: NO CALLBACK REQ C3 ONCOLOGY  Read 5:21 AM   0'\"   3/13/22 5:21 AM   Id get a picc consult, hes going to need abx for a while it dounds

## 2022-03-13 NOTE — PROGRESS NOTES
Pt axo. Assessment completed as charted. Pt states pain is controlled at this time. Pt irritated as he states he wants to go home and does not understand why he needs a PICC line. MD paged and confirmed need for PICC line. Updated pt. Pt still unhappy. Will continue to monitor. Call light in reach.

## 2022-03-13 NOTE — PROCEDURES
DIT VASCULAR ACCESS SERVICES    PICC PLACEMENT: Preprocedure & timeout done w primary RN Hilaudra; +DL PICC Order verified; +consent obtained at bedside when pt available to sign prior to procedure; no difficulty accessing L Basilic vein; pt has potential to go home w PICC and he prefers the L arm to be used for home abx iv therapy; +slight difficulty advancing the PICC catheter; however successful; 1st catheter cut too short; had to pass a longer catheter which was successful in advancing and PICC tip confirmation; picc tip is verified using [employer] G4 tracking system, confirming optimal tip positioning at 2 cm out; +pwave changes visualized wo negative deflection; +blue bullseye G4 indicator noted for tip verification; copy of confirmation report left in pts paper chart; reported same and ok to use PICC to primary RN; pt tolerated procedure very well; pt is instructed to remain in bed at rest for 30 min post procedure in order to promote hemostasis to the insertion site, and he agrees to do so; pt is left in a position of comfort w siderails up x2, call light in reach and bed is locked in lowest position; Order for OK to use PICC is placed in EMR    NURSES:  *please replace all existing IV tubing with new IV tubing prior to using the PICC for current IV infusions. *please remove any PIVs from LEFT arm. *NO further venipunctures in the LEFT arm while PICC remains in place. *please refrain from obtaining BPs in the LEFT arm. All of the above may be sources of infection or damage to the PICC line/site.     CARL Lockwood RN, BSN, Linus Jensen.

## 2022-03-13 NOTE — PROGRESS NOTES
[facility] GENERAL SURGERY DAILY PROGRESS NOTE    SUBJECTIVE: Awake, alert    OBJECTIVE: CURRENT VITALS:  BP (!) 153/87   Pulse 80   Temp 97.7 °F (36.5 °C) (Oral)   Resp 18   Ht 6' 4\" (1.93 m)   Wt (!) 420 lb 1.6 oz (190.6 kg)   SpO2 97%   BMI 51.14 kg/m²          L groin still with dependent erythema. No evident pus. LABS:    CBC: Recent Labs     03/11/22  0505 03/12/22  0519 03/13/22  0631   WBC 9.0 8.8 6.2   RBC 4.32 4.25 4.60   HGB 12.4* 11.9* 12.9*   HCT 37.2* 35.6* 39.3*   MCV 86.1 83.7 85.4   RDW 13.3 13.3 13.3    260 240     BMP:   Recent Labs     03/11/22  0505 03/12/22  0519 03/13/22  0631    135* 138   K 3.6 3.4* 3.8   CL 98* 97* 100   CO2 27 26 27   BUN 15 16 16   CREATININE 1.1 1.1 1.1     Recent Labs     03/10/22  1819 03/12/22  0519   MG 2.00 1.90       ASSESSMENT:   POD 3 debridement necrotizing soft tissue infection L groin        PLAN:   Continue antibiotics. ID to evaluate. Local wound care as ordered.         Vishnu Vogel MD

## 2022-03-13 NOTE — CONSULTS
Infectious Diseases   Consult Note      Reason for Consult: nec fasciitis; abx recs    Requesting Physician:  Dr. Pastor Mcdaniels      Date of Admission: 3/9/2022  Subjective:   CHIEF COMPLAINT:   None given       HPI:    Constantin Bella is a 49yoM with history of GERd, HTN, ALFIE, obesity  Uncontrolled DM                ED 3/9/22 - several days worsening L groin/leg swelling, pain, redness. CT with findings of necrotizing fasciitis with ST gas, stranding, LAD  OR 3/10/22 - excisional debridement. Culture with GpBS; GNR on the GS    Leukocytosis has resolved  Fever curve improved, AF today   He does not feel unwell. Has had issues maintaining IV access  Voiding without difficulty. No N/D       Current abx:  Meropenem 1g q8   vanc          Past Surgical History:       Diagnosis Date    Acid reflux     Hypertension     Sleep apnea          Procedure Laterality Date    GROIN SURGERY Left 3/10/2022    LEFT GROIN DEBRIDEMENT performed by Sienna Carolina MD at [address] [place] [address] ARTHROSCOPY Right     WISDOM TOOTH EXTRACTION Bilateral        Social History:    TOBACCO:   reports that he has never smoked. He quit smokeless tobacco use about 12 years ago. ETOH:   reports current alcohol use of about 1.0 standard drink of alcohol per week. There is no history of illicit drug use or other significant epidemiologic exposures.       Family History:       Problem Relation Age of Onset    Stroke Maternal Grandfather        Current Medications:    Current Facility-Administered Medications: ketorolac (TORADOL) injection 30 mg, 30 mg, IntraVENous, Q8H PRN  insulin glargine (LANTUS) injection vial 5 Units, 5 Units, SubCUTAneous, Nightly  metFORMIN (GLUCOPHAGE) tablet 850 mg, 850 mg, Oral, BID WC  sodium chloride flush 0.9 % injection 10 mL, 10 mL, IntraVENous, 2 times per day  sodium chloride flush 0.9 % injection 10 mL, 10 mL, IntraVENous, PRN  0.9 % sodium chloride infusion, 25 mL, IntraVENous, PRN  potassium chloride 10 mEq/100 mL IVPB (Peripheral Line), 10 mEq, IntraVENous, PRN  magnesium sulfate 2000 mg in 50 mL IVPB premix, 2,000 mg, IntraVENous, PRN  promethazine (PHENERGAN) tablet 12.5 mg, 12.5 mg, Oral, Q6H PRN **OR** ondansetron (ZOFRAN) injection 4 mg, 4 mg, IntraVENous, Q6H PRN  acetaminophen (TYLENOL) tablet 650 mg, 650 mg, Oral, Q6H PRN **OR** acetaminophen (TYLENOL) suppository 650 mg, 650 mg, Rectal, Q6H PRN  meropenem (MERREM) 1,000 mg in sodium chloride 0.9 % 100 mL IVPB (mini-bag), 1,000 mg, IntraVENous, Q8H  glucose (GLUTOSE) 40 % oral gel 15 g, 15 g, Oral, PRN  dextrose 50 % IV solution, 12.5 g, IntraVENous, PRN  glucagon (rDNA) injection 1 mg, 1 mg, IntraMUSCular, PRN  dextrose 5 % solution, 100 mL/hr, IntraVENous, PRN  labetalol (NORMODYNE;TRANDATE) injection 10 mg, 10 mg, IntraVENous, Q4H PRN  potassium bicarb-citric acid (EFFER-K) effervescent tablet 20 mEq, 20 mEq, Oral, Daily  amLODIPine (NORVASC) tablet 5 mg, 5 mg, Oral, Daily  atenolol (TENORMIN) tablet 50 mg, 50 mg, Oral, Daily  enoxaparin (LOVENOX) injection 40 mg, 40 mg, SubCUTAneous, Daily  oxyCODONE (ROXICODONE) immediate release tablet 5 mg, 5 mg, Oral, Q4H PRN **OR** oxyCODONE (ROXICODONE) immediate release tablet 10 mg, 10 mg, Oral, Q4H PRN  HYDROmorphone (DILAUDID) injection 1 mg, 1 mg, IntraVENous, Q3H PRN  insulin lispro (HUMALOG) injection vial 0-18 Units, 0-18 Units, SubCUTAneous, TID WC  insulin lispro (HUMALOG) injection vial 0-9 Units, 0-9 Units, SubCUTAneous, Nightly      No Known Allergies       REVIEW OF SYSTEMS:    CONSTITUTIONAL:   F/C PTA denied   EYES:  negative for blurred vision, eye discharge, acute visual disturbance and icterus  HEENT:  negative for acute hearing loss, tinnitus, ear drainage, sinus pressure, nasal congestion, epistaxis and snoring  RESPIRATORY:  No cough, shortness of breath, hemoptysis  CARDIOVASCULAR:  negative for chest pain, palpitations, exertional chest pressure/discomfort, syncope  GASTROINTESTINAL: negative for nausea, vomiting, diarrhea, constipation, blood in stool and abdominal pain  GENITOURINARY:  negative for frequency, dysuria, urinary incontinence, decreased urine volume, and hematuria  HEMATOLOGIC/LYMPHATIC:  negative for easy bruising, bleeding and lymphadenopathy  ALLERGIC/IMMUNOLOGIC:  negative for recurrent infections, angioedema, anaphylaxis and drug reactions  ENDOCRINE:  negative for weight changes and diabetic symptoms including polyuria, polydipsia and polyphagia  MUSCULOSKELETAL:  negative for acute joint swelling, decreased range of motion and muscle weakness  NEUROLOGICAL:  negative for headaches, slurred speech, unilateral weakness  PSYCHIATRIC/BEHAVIORAL: negative for hallucinations, behavioral problems, confusion and agitation. Objective:   PHYSICAL EXAM:      VITALS:  BP (!) 153/87   Pulse 80   Temp 97.7 °F (36.5 °C) (Oral)   Resp 18   Ht 6' 4\" (1.93 m)   Wt (!) 420 lb 1.6 oz (190.6 kg)   SpO2 97%   BMI 51.14 kg/m²      24HR INTAKE/OUTPUT:      Intake/Output Summary (Last 24 hours) at 3/13/2022 1259  Last data filed at 3/12/2022 1600  Gross per 24 hour   Intake --   Output 500 ml   Net -500 ml     CONSTITUTIONAL:  Awake, alert, cooperative, no apparent distress, and appears stated age  Morbidly obese   HEENT: NCAT, PERRL, EOMI. Sclera white   NECK:  Supple, symmetrical  LUNGS:  no increased work of breathing  ABDOMEN:  normal bowel sounds, soft  Large panniculus   L groin with area of lymphedema, persistent erythema, calor, induration  Groin wound packed   No crepitus   PSYCHIATRIC: Oriented to person place and time. No obvious depression or anxiety. MUSCULOSKELETAL: No obvious misalignment or effusion of the joints. No clubbing, cyanosis of the digits.   NEUROLOGIC: nonfocal exam  ACCESS:   PICC LUE  Phlebitis R FA and L AC noted       DATA:    Old records have been reviewed      CBC:  Recent Labs     03/11/22  0505 03/12/22  0519 03/13/22  0631   WBC 9.0 8.8 6.2   RBC 4. 32 4.25 4.60   HGB 12.4* 11.9* 12.9*   HCT 37.2* 35.6* 39.3*    260 240   MCV 86.1 83.7 85.4   MCH 28.7 28.1 28.1   MCHC 33.3 33.6 32.9   RDW 13.3 13.3 13.3      BMP:  Recent Labs     03/11/22  0505 03/12/22  0519 03/13/22  0631    135* 138   K 3.6 3.4* 3.8   CL 98* 97* 100   CO2 27 26 27   BUN 15 16 16   CREATININE 1.1 1.1 1.1   CALCIUM 8.0* 8.1* 8.5   GLUCOSE 235* 183* 165*        Cultures:   3/10 BC x2 NGTD   Operative culture GpBS, GNR/GPC on GS       Radiology Review:  All pertinent images / reports were reviewed as a part of this visit. CT ap 3/9/22  Impression   Skin thickening and subcutaneous inflammatory stranding suggestive of   cellulitis.  Soft tissue gas is also noted along the area of cellulitis, for   which underlying necrotizing fasciitis cannot be excluded.  Associated   reactive lymphadenopathy is identified in the left inguinal region.       Fatty infiltration of the liver.       Otherwise no acute process. Assessment:     Patient Active Problem List   Diagnosis    GERD (gastroesophageal reflux disease)    HTN (hypertension)    Severe obstructive sleep apnea    Morbid obesity with BMI of 50.0-59.9, adult ([address]    Pure hypercholesterolemia    Necrotizing fasciitis ([address]    Cellulitis of groin       Necrotizing soft tissue infection L groin/thigh  S/p I&D 3/10/22  GpBS in culture; GNR seen on the stain as well   Clinically improved. Leukocytosis has resolved. Fever curve improved. No abx allergies     -DC meropenem and vanc and give Unasyn  -once improved, change to po Augmentin 875 BID for another 10d  -GpBS invasive infections are closely linked to uncontrolled DM. Gaining better control of DM would be the primary intervention to reduce risk of relapse or recurrent infection     D/w patient, questions addressed  D/w RN, Hospitalist     Call with further concerns        Bryan Fischer M.D.     Thank you for the opportunity to participate in the care of your patient.     Please do not hesitate to contact me:   [phone] office

## 2022-03-14 ENCOUNTER — APPOINTMENT (OUTPATIENT)
Dept: INTERVENTIONAL RADIOLOGY/VASCULAR | Age: 47
DRG: 853 | End: 2022-03-14
Payer: COMMERCIAL

## 2022-03-14 VITALS
BODY MASS INDEX: 38.36 KG/M2 | HEART RATE: 68 BPM | DIASTOLIC BLOOD PRESSURE: 105 MMHG | SYSTOLIC BLOOD PRESSURE: 189 MMHG | RESPIRATION RATE: 20 BRPM | WEIGHT: 315 LBS | OXYGEN SATURATION: 92 % | HEIGHT: 76 IN | TEMPERATURE: 97.4 F

## 2022-03-14 LAB
ANION GAP SERPL CALCULATED.3IONS-SCNC: 12 MMOL/L (ref 3–16)
BLOOD CULTURE, ROUTINE: NORMAL
BUN BLDV-MCNC: 12 MG/DL (ref 7–20)
CALCIUM SERPL-MCNC: 8.5 MG/DL (ref 8.3–10.6)
CHLORIDE BLD-SCNC: 104 MMOL/L (ref 99–110)
CO2: 29 MMOL/L (ref 21–32)
CREAT SERPL-MCNC: 0.9 MG/DL (ref 0.9–1.3)
CULTURE, BLOOD 2: NORMAL
GFR AFRICAN AMERICAN: >60
GFR NON-AFRICAN AMERICAN: >60
GLUCOSE BLD-MCNC: 140 MG/DL (ref 70–99)
GLUCOSE BLD-MCNC: 145 MG/DL (ref 70–99)
GLUCOSE BLD-MCNC: 174 MG/DL (ref 70–99)
HCT VFR BLD CALC: 36.8 % (ref 40.5–52.5)
HEMOGLOBIN: 12.3 G/DL (ref 13.5–17.5)
MCH RBC QN AUTO: 28.2 PG (ref 26–34)
MCHC RBC AUTO-ENTMCNC: 33.5 G/DL (ref 31–36)
MCV RBC AUTO: 84.3 FL (ref 80–100)
MRSA SCREEN RT-PCR: NORMAL
PDW BLD-RTO: 13.4 % (ref 12.4–15.4)
PERFORMED ON: ABNORMAL
PERFORMED ON: ABNORMAL
PLATELET # BLD: 256 K/UL (ref 135–450)
PMV BLD AUTO: 6.5 FL (ref 5–10.5)
POTASSIUM REFLEX MAGNESIUM: 3.9 MMOL/L (ref 3.5–5.1)
RBC # BLD: 4.36 M/UL (ref 4.2–5.9)
SODIUM BLD-SCNC: 145 MMOL/L (ref 136–145)
WBC # BLD: 5.8 K/UL (ref 4–11)

## 2022-03-14 PROCEDURE — 6370000000 HC RX 637 (ALT 250 FOR IP): Performed by: INTERNAL MEDICINE

## 2022-03-14 PROCEDURE — 2580000003 HC RX 258: Performed by: SURGERY

## 2022-03-14 PROCEDURE — 6370000000 HC RX 637 (ALT 250 FOR IP): Performed by: SURGERY

## 2022-03-14 PROCEDURE — 6360000002 HC RX W HCPCS: Performed by: INTERNAL MEDICINE

## 2022-03-14 PROCEDURE — 36573 INSJ PICC RS&I 5 YR+: CPT

## 2022-03-14 PROCEDURE — 6370000000 HC RX 637 (ALT 250 FOR IP): Performed by: HOSPITALIST

## 2022-03-14 PROCEDURE — 6360000002 HC RX W HCPCS: Performed by: SURGERY

## 2022-03-14 PROCEDURE — C1751 CATH, INF, PER/CENT/MIDLINE: HCPCS

## 2022-03-14 PROCEDURE — 80048 BASIC METABOLIC PNL TOTAL CA: CPT

## 2022-03-14 PROCEDURE — 6370000000 HC RX 637 (ALT 250 FOR IP): Performed by: NURSE PRACTITIONER

## 2022-03-14 PROCEDURE — 99231 SBSQ HOSP IP/OBS SF/LOW 25: CPT | Performed by: SURGERY

## 2022-03-14 PROCEDURE — 2580000003 HC RX 258: Performed by: INTERNAL MEDICINE

## 2022-03-14 PROCEDURE — 85027 COMPLETE CBC AUTOMATED: CPT

## 2022-03-14 PROCEDURE — APPSS45 APP SPLIT SHARED TIME 31-45 MINUTES: Performed by: CLINICAL NURSE SPECIALIST

## 2022-03-14 RX ORDER — AMOXICILLIN AND CLAVULANATE POTASSIUM 875; 125 MG/1; MG/1
1 TABLET, FILM COATED ORAL 2 TIMES DAILY
Qty: 20 TABLET | Refills: 0 | Status: SHIPPED | OUTPATIENT
Start: 2022-03-14 | End: 2022-03-24

## 2022-03-14 RX ORDER — OXYCODONE HYDROCHLORIDE 5 MG/1
5 TABLET ORAL EVERY 6 HOURS PRN
Qty: 28 TABLET | Refills: 0 | Status: SHIPPED | OUTPATIENT
Start: 2022-03-14 | End: 2022-03-21

## 2022-03-14 RX ADMIN — LOSARTAN POTASSIUM 50 MG: 25 TABLET, FILM COATED ORAL at 10:06

## 2022-03-14 RX ADMIN — AMPICILLIN SODIUM AND SULBACTAM SODIUM 3000 MG: 2; 1 INJECTION, POWDER, FOR SOLUTION INTRAMUSCULAR; INTRAVENOUS at 02:44

## 2022-03-14 RX ADMIN — INSULIN LISPRO 3 UNITS: 100 INJECTION, SOLUTION INTRAVENOUS; SUBCUTANEOUS at 12:35

## 2022-03-14 RX ADMIN — SODIUM CHLORIDE, PRESERVATIVE FREE 10 ML: 5 INJECTION INTRAVENOUS at 10:19

## 2022-03-14 RX ADMIN — ATENOLOL 100 MG: 25 TABLET ORAL at 10:06

## 2022-03-14 RX ADMIN — AMPICILLIN SODIUM AND SULBACTAM SODIUM 3000 MG: 2; 1 INJECTION, POWDER, FOR SOLUTION INTRAMUSCULAR; INTRAVENOUS at 10:24

## 2022-03-14 RX ADMIN — ENOXAPARIN SODIUM 40 MG: 40 INJECTION SUBCUTANEOUS at 10:06

## 2022-03-14 RX ADMIN — INSULIN LISPRO 3 UNITS: 100 INJECTION, SOLUTION INTRAVENOUS; SUBCUTANEOUS at 08:30

## 2022-03-14 ASSESSMENT — PAIN SCALES - GENERAL: PAINLEVEL_OUTOF10: 0

## 2022-03-14 NOTE — PROGRESS NOTES
Hospitalist Progress Note      PCP: JACKIE Worthy    Date of Admission: 3/9/2022    Chief Complaint: L LE blister/infection    Subjective: no new c/o. Medications:  Reviewed    Infusion Medications    sodium chloride Stopped (03/10/22 2037)    dextrose       Scheduled Medications    ampicillin-sulbactam  3,000 mg IntraVENous Q6H    insulin glargine  10 Units SubCUTAneous Nightly    losartan  50 mg Oral BID    amLODIPine  5 mg Oral Nightly    atenolol  100 mg Oral Daily    metFORMIN  850 mg Oral BID WC    sodium chloride flush  10 mL IntraVENous 2 times per day    potassium bicarb-citric acid  20 mEq Oral Daily    amLODIPine  5 mg Oral Daily    enoxaparin  40 mg SubCUTAneous Daily    insulin lispro  0-18 Units SubCUTAneous TID WC    insulin lispro  0-9 Units SubCUTAneous Nightly     PRN Meds: ketorolac, sodium chloride flush, sodium chloride, promethazine **OR** ondansetron, acetaminophen **OR** acetaminophen, glucose, dextrose, glucagon (rDNA), dextrose, labetalol, oxyCODONE **OR** oxyCODONE, HYDROmorphone      Intake/Output Summary (Last 24 hours) at 3/14/2022 1014  Last data filed at 3/14/2022 0498  Gross per 24 hour   Intake 2300 ml   Output --   Net 2300 ml       Physical Exam Performed:    BP (!) 192/96   Pulse 70   Temp 97.7 °F (36.5 °C) (Oral)   Resp 20   Ht 6' 4\" (1.93 m)   Wt (!) 420 lb 1.6 oz (190.6 kg)   SpO2 95%   BMI 51.14 kg/m²     General appearance: No apparent distress, appears stated age and cooperative. HEENT: Pupils equal, round, and reactive to light. Conjunctivae/corneas clear. Neck: Supple, with full range of motion. No jugular venous distention. Trachea midline. Respiratory:  Normal respiratory effort. Clear to auscultation, bilaterally without Rales/Wheezes/Rhonchi. Cardiovascular: Regular rate and rhythm with normal S1/S2 without murmurs, rubs or gallops. Abdomen: Soft, non-tender, non-distended with normal bowel sounds.   Musculoskeletal: No clubbing, cyanosis or edema bilaterally. Full range of motion without deformity. Skin: Skin color, texture, turgor normal.  No rashes or lesions. Neurologic:  Neurovascularly intact without any focal sensory/motor deficits. Cranial nerves: II-XII intact, grossly non-focal.  Psychiatric: Alert and oriented, thought content appropriate, normal insight  Capillary Refill: Brisk,< 3 seconds   Peripheral Pulses: +2 palpable, equal bilaterally       Labs:   Recent Labs     03/12/22 0519 03/13/22 0631 03/14/22  0530   WBC 8.8 6.2 5.8   HGB 11.9* 12.9* 12.3*   HCT 35.6* 39.3* 36.8*    240 256     Recent Labs     03/12/22 0519 03/13/22 0631 03/14/22  0530   * 138 145   K 3.4* 3.8 3.9   CL 97* 100 104   CO2 26 27 29   BUN 16 16 12   CREATININE 1.1 1.1 0.9   CALCIUM 8.1* 8.5 8.5     Recent Labs     03/12/22 0519   AST 49*   ALT 50*   BILITOT 0.9   ALKPHOS 67     No results for input(s): INR in the last 72 hours. No results for input(s): Melissa Divine in the last 72 hours. Urinalysis:      Lab Results   Component Value Date    NITRU Negative 03/10/2022    BLOODU Negative 03/10/2022    SPECGRAV 1.010 03/10/2022    GLUCOSEU >=1000 03/10/2022       Consults:    IP CONSULT TO GENERAL SURGERY  PHARMACY TO DOSE VANCOMYCIN  IP CONSULT TO INFECTIOUS DISEASES      Assessment/Plan:    Active Hospital Problems    Diagnosis     Necrotizing fasciitis ([facility] [address] [M72.6]     Cellulitis of groin [Z20.058]          This is a 45-year-old male admitted with a sepsis secondary left proximal groin cellulitis, necrotizing tissue infection is status post I&D, blood cultures so far negative wound culture positive for group B beta strep, infectious disease consult appreciated meropenem and vancomycin has been discontinued started on Unasyn. Hypertensive urgency on admission resolved continue with home medication. HypoKalemia - etiology clinically unable to determine. Follow serial labs and replace PRN.   Reviewed and documented as above. Diabetes mellitus type II,  will increase the basal insulin continue with sliding scale and Metformin hemoglobin A1c= 10.9 on 3/10/2022. Morbid Obesity -  With Body mass index is 51.14 kg/m². Complicating assessment and treatment. Placing patient at risk for multiple co-morbidities as well as early death and contributing to the patient's presentation. Counseled on weight loss. DVT Prophylaxis: LMWH    Recent Labs     03/12/22  0519 03/13/22  0631 03/14/22  0530    240 256     Diet: ADULT DIET; Regular  Code Status: Full Code      PT/OT Eval Status: not yet ordered. Dispo - Patient is likely to remain in-house at least until Tues/Wed 15/16 March pending clinical course and subspecialty recs.        Kiersten James MD

## 2022-03-14 NOTE — DISCHARGE INSTR - COC
Continuity of Care Form    Patient Name: Conchis Mars   :  1975  MRN:  [id number]    Admit date:  3/9/2022  Discharge date:  2022    Code Status Order: Full Code   Advance Directives:   [facility] Documentation       Date/Time Healthcare Directive Type of Healthcare Directive Copy in [facility] [address] Agent's Name Healthcare Agent's Phone Number    03/10/22 5670 No, patient does not have an advance directive for healthcare treatment -- -- -- -- --            Admitting Physician:  Maame Huerta DO  PCP: JACKIE Tillman    Discharging Nurse: Frederic Bolaños Unit/Room#: 0321/0321-01  Discharging Unit Phone Number: [phone]    Emergency Contact:   Extended Emergency Contact Information  Primary Emergency Contact: Ketty Azul  Address: [address] [place] [address] Phone: [phone]  Work Phone: [phone]  Mobile Phone: [phone]  Relation: Spouse    Past Surgical History:  Past Surgical History:   Procedure Laterality Date    GROIN SURGERY Left 3/10/2022    LEFT GROIN DEBRIDEMENT performed by Adalberto Hernandez MD at [address] [place] [address] ARTHROSCOPY Right     WISDOM TOOTH EXTRACTION Bilateral        Immunization History:   Immunization History   Administered Date(s) Administered    COVID-19, Pfizer Purple top, DILUTE for use, 12+ yrs, 30mcg/0.3mL dose 2021, 2021    Tdap (Boostrix, Adacel) 03/10/2022       Active Problems:  Patient Active Problem List   Diagnosis Code    GERD (gastroesophageal reflux disease) K21.9    HTN (hypertension) I10    Severe obstructive sleep apnea G47.33    Morbid obesity with BMI of 50.0-59.9, adult ([facility] [address] E66.01, Z68.43    Pure hypercholesterolemia E78.00    Necrotizing fasciitis ([facility] [address] M72.6    Cellulitis of groin L03.314       Isolation/Infection:   Isolation            No Isolation          Patient Infection Status       Infection Onset restrictions  Other Medical Equipment (for information only, NOT a DME order):  N/A  Other Treatments: Wound Care    Patient's personal belongings (please select all that are sent with patient):  Glasses, clothes, phone, and medications    RN SIGNATURE:  Electronically signed by Jonas Mckeon RN on 3/14/22 at 5:38 PM EDT    CASE MANAGEMENT/SOCIAL WORK SECTION    Inpatient Status Date: 03/10/2022    Readmission Risk Assessment Score:  Readmission Risk              Risk of Unplanned Readmission:  9         Discharging to Facility/ [address] [place] [address]   [phone]     / signature: Electronically signed by NIGEL Wadsworth on 3/14/22 at 11:30 AM EDT    PHYSICIAN SECTION    Prognosis: Good    Condition at Discharge: Stable    Rehab Potential (if transferring to Rehab): Good    Recommended Labs or Other Treatments After Discharge: None    Physician Certification: I certify the above information and transfer of Syeda Hollingsworth  is necessary for the continuing treatment of the diagnosis listed and that he requires [address] for less 30 days.      Update Admission H&P: No change in H&P    PHYSICIAN SIGNATURE:  Electronically signed by Traci Carrasquillo MD on 3/14/22 at 3:23 PM EDT

## 2022-03-14 NOTE — PROGRESS NOTES
Pt. Resting in bed. Alert/oriented. Vitals and assessment stable as charted. /96; will give AM BP meds as scheduled. Denies any pain/nausea or any other discomfort at present time. Call light in reach. Will continue to monitor.

## 2022-03-14 NOTE — PROGRESS NOTES
Discharge: Pt discharged to home as per order. IV removed. Prescriptions & instructions reviewed. L groin dressing changed again. Pt verbalized understanding. Denied questions. Taken off unit in stable & ambulatory condition.

## 2022-03-14 NOTE — CARE COORDINATION
Hospital day 4: Patient on C3 re Cellulitis of groin followed by IM, General surgery, and ID. Patient from home with spouse, Star Cox. Patient with IV ATB, per ID plans to transition home with oral ATB. Patient will have wound care needs at [place], referral placed to [facility], pending at this time. NIGEL Blake  8593: Spoke with Patient is aware [facility] has accepted reports has already scheduled home visit,for skilled RN/wound care denies other needs. NIGEL Blake  8178: [place] orders noted   CASE MANAGEMENT DISCHARGE SUMMARY    Discharge to: Home with spouse     Transportation: Via private car     Confirmed discharge plan with: Patient     Facility/Agency, name:  [address] [place] [address] skilled Rn/wound care [facility]- pulled by Akil Cardenas [phone]     [facility], name: Nella Ignacio RN    . NIGEL Blake

## 2022-03-14 NOTE — PROGRESS NOTES
[facility] GENERAL SURGERY    Surgery Progress Note           POD #  4    PATIENT NAME: Stephy Damon     TODAY'S DATE: 3/14/2022    INTERVAL HISTORY:    Pt with mild pain, no fevers. Anxious to go home. OBJECTIVE:   VITALS:  BP (!) 192/96   Pulse 70   Temp 97.7 °F (36.5 °C) (Oral)   Resp 20   Ht 6' 4\" (1.93 m)   Wt (!) 420 lb 1.6 oz (190.6 kg)   SpO2 95%   BMI 51.14 kg/m²     INTAKE/OUTPUT:    I/O last 3 completed shifts: In: 2300 [P.O.:2300]  Out: -   No intake/output data recorded. CONSTITUTIONAL:  awake and alert  Left groin:  Indurated, erythema, wound base with granulation, some purulence but no necrosis    Data:  CBC:   Recent Labs     03/12/22  0519 03/13/22  0631 03/14/22  0530   WBC 8.8 6.2 5.8   HGB 11.9* 12.9* 12.3*   HCT 35.6* 39.3* 36.8*    240 256     BMP:    Recent Labs     03/12/22  0519 03/13/22  0631 03/14/22  0530   * 138 145   K 3.4* 3.8 3.9   CL 97* 100 104   CO2 26 27 29   BUN 16 16 12   CREATININE 1.1 1.1 0.9   GLUCOSE 183* 165* 145*     Hepatic:   Recent Labs     03/12/22  0519   AST 49*   ALT 50*   BILITOT 0.9   ALKPHOS 67     Mag:      Recent Labs     03/12/22  0519   MG 1.90          ASSESSMENT AND PLAN:  55 y.o. male status post left groin debridement    Continue with wet to dry dressing. Antibiotics per ID. [address] Karis Tobar for pt to discharge today with close early follow up in our office on Thursday. Electronically signed by MICHELLE Roger CNP   Patient seen and agree with above and more than half of the total time was spent by me on the encounter. Stable erythema, no fevers and WBC normal.  Ok to discharge on oral abx and home wound care.     Mary Cali MD

## 2022-03-15 ENCOUNTER — TELEPHONE (OUTPATIENT)
Dept: FAMILY MEDICINE CLINIC | Age: 47
End: 2022-03-15

## 2022-03-15 ENCOUNTER — CARE COORDINATION (OUTPATIENT)
Dept: CASE MANAGEMENT | Age: 47
End: 2022-03-15

## 2022-03-15 NOTE — TELEPHONE ENCOUNTER
[facility] Transitions Initial Follow Up Call    Outreach made within 2 business days of discharge: Yes    Patient: Sherman Paredes Patient : 1975   MRN: [id number]  Reason for Admission: There are no discharge diagnoses documented for the most recent discharge. Discharge Date: 3/14/22       Spoke with: Patient, was [facility] with [facility] RN, will call back to schedule    Discharge department/facility: A    Non-face-to-face services provided:  Obtained and reviewed discharge summary and/or continuity of care documents    TCM Interactive Patient Contact:  Was patient able to fill all prescriptions: Yes  Was patient instructed to bring all medications to the follow-up visit: Yes  Is patient taking all medications as directed in the discharge summary?  Yes  Does patient understand their discharge instructions: Yes  Does patient have questions or concerns that need addressed prior to 7-14 day follow up office visit: no    Scheduled appointment with PCP within 7-14 days    Follow Up  Future Appointments   Date Time Provider Yao Ambrose   3/17/2022  2:30 PM To Valera MD Orjo[address] [place] [address], [facility]

## 2022-03-15 NOTE — CARE COORDINATION
[facility] Transitions Initial Follow Up Call    Call within 2 business days of discharge: Yes    Patient: Smith Soliman Patient : 1975   MRN: [id number]  Reason for Admission: Cellulitis of groin   Discharge Date: 3/14/22 RARS: Readmission Risk Score: 8.5 ( )      Last Discharge [facility]       Complaint Diagnosis Description Type Department Provider    3/9/22 Blister Cellulitis of groin . .. ED to Hosp-Admission (Discharged) (ADMITTED) Mckayla Mendez MD; Geena SCHMITZ Con. .. Spoke with: [address]: [facility]     Transitions of Care Initial Call    Was this an external facility discharge? No Discharge Facility: n/a    Challenges to be reviewed by the provider   Additional needs identified to be addressed with provider: No  none             Method of communication with provider : none      Advance Care Planning:   Does patient have an Advance Directive: not on file. Was this a readmission? No  Patient stated reason for admission: cellulitis   Patients top risk factors for readmission: medical condition-.    Care Transition Nurse (CTN) contacted the patient by telephone to perform post hospital discharge assessment. Verified name and  with patient as identifiers. Provided introduction to self, and explanation of the CTN role. CTN reviewed discharge instructions, medical action plan and red flags with patient who verbalized understanding. Patient given an opportunity to ask questions and does not have any further questions or concerns at this time. Were discharge instructions available to patient? Yes. Reviewed appropriate site of care based on symptoms and resources available to patient including: PCP, Specialist, Home health and When to call 911. The patient agrees to contact the PCP office for questions related to their healthcare. Medication reconciliation was performed with patient, who verbalizes understanding of administration of home medications.  Advised obtaining a 90-day supply of all daily and as-needed medications. Reviewed and educated patient on any new and changed medications related to discharge diagnosis. CTN provided contact information. No further follow-up call indicated based on severity of symptoms and risk factors. Spoke with patient who reported he is doing alright. Patient reported he is managing pain well and redness is improving and denied any worsening swelling. Ctn reviewed new and changed prescriptions and patient declined full medication review however, patient confirmed he is taking all medications as prescribed. Patient reported home care nurse was present and changing wound via groin. Patient has apt with Dr. Che Avalos on 3/17. Patient declined CTN offer to setup PCP apt and reported he would contact PCP himself. Denies any acute needs at present time. Educated on the use of urgent care or physicians 24 hr access line if assistance is needed after hours & that they can always contact their home care provider to request a nurse visit even if it isn't their regularly scheduled day for their nurse to visit.            Care Transitions 24 Hour Call    Care Transitions Interventions         Follow Up  Future Appointments   Date Time Provider Yao Ambrose   3/17/2022  2:30 PM Hany Ramos MD AND GEN & LA IKE WILKESN, RN, [facility]  Care Transition Nurse  [phone] mobile

## 2022-03-15 NOTE — DISCHARGE SUMMARY
Hospital Medicine Discharge Summary    Patient ID: Claus Davis      Patient's PCP: JACKIE Nugent    Admit Date: 3/9/2022     Discharge Date: 3/14/2022      Admitting Physician: Tahir Ashby DO     Discharge Physician: Candance Raker, MD     Discharge Diagnoses: Active Hospital Problems    Diagnosis     Necrotizing fasciitis ([facility] [address] [M72.6]     Cellulitis of groin [A02.647]        The patient was seen and examined on day of discharge and this discharge summary is in conjunction with any daily progress note from day of discharge. Hospital Course:               This is a 51-year-old male admitted with a sepsis secondary left proximal groin cellulitis, necrotizing tissue infection is status post I&D, blood cultures so far negative wound culture positive for group B beta strep, infectious disease consult appreciated meropenem and vancomycin has been discontinued started on Unasyn.     Hypertensive urgency on admission resolved continue with home medication.     HypoKalemia - etiology clinically unable to determine. Followed serial labs and replaced PRN.      Diabetes mellitus type II,  will increase the basal insulin continue with sliding scale and Metformin hemoglobin A1c= 10.9 on 3/10/2022.     Morbid Obesity -  With Body mass index is 51.14 kg/m². Complicating assessment and treatment. Placing patient at risk for multiple co-morbidities as well as early death and contributing to the patient's presentation. Counseled on weight loss.        Labs:  For convenience and continuity at follow-up the following most recent labs are provided:      CBC:    Lab Results   Component Value Date    WBC 5.8 03/14/2022    HGB 12.3 03/14/2022    HCT 36.8 03/14/2022     03/14/2022       Renal:    Lab Results   Component Value Date     03/14/2022    K 3.9 03/14/2022     03/14/2022    CO2 29 03/14/2022    BUN 12 03/14/2022    CREATININE 0.9 03/14/2022    CALCIUM 8.5 03/14/2022         Significant Diagnostic Studies    Radiology:   IR PICC WO SQ PORT/PUMP > 5 YEARS   Final Result      XR CHEST PORTABLE   Final Result   Stable appearing chest without acute cardiopulmonary process. CT ABDOMEN PELVIS W IV CONTRAST Additional Contrast? None   Final Result   Skin thickening and subcutaneous inflammatory stranding suggestive of   cellulitis. Soft tissue gas is also noted along the area of cellulitis, for   which underlying necrotizing fasciitis cannot be excluded. Associated   reactive lymphadenopathy is identified in the left inguinal region. Fatty infiltration of the liver. Otherwise no acute process. Findings were called to and discussed with Dr. Twin Reinoso in the emergency   department at 10:15 p.m. on 03/09/2022. Consults:     IP CONSULT TO GENERAL SURGERY  PHARMACY TO DOSE VANCOMYCIN  IP CONSULT TO INFECTIOUS DISEASES  IP CONSULT TO HOME CARE NEEDS  IP CONSULT TO HOME CARE NEEDS    Disposition: Home     Condition at Discharge: Stable    Discharge Instructions/Follow-up:  w/ PCP 1-2 weeks and subspecialists as arranged. Code Status:  Full Code    Activity: activity as tolerated    Diet: regular diet      Discharge Medications:     Discharge Medication List as of 3/14/2022  5:41 PM           Details   oxyCODONE (ROXICODONE) 5 MG immediate release tablet Take 1 tablet by mouth every 6 hours as needed for Pain for up to 7 days. , Disp-28 tablet, R-0Print      amoxicillin-clavulanate (AUGMENTIN) 875-125 MG per tablet Take 1 tablet by mouth 2 times daily for 10 days, Disp-20 tablet, R-0Print              Details   amLODIPine (NORVASC) 5 MG tablet Take 1 tablet by mouth daily, Disp-90 tablet, R-1Normal      atenolol (TENORMIN) 100 MG tablet TAKE ONE TABLET BY MOUTH DAILY, Disp-90 tablet, R-1Normal      losartan (COZAAR) 50 MG tablet Take 1 tablet by mouth 2 times daily, Disp-180 tablet, R-1Normal      metFORMIN (GLUCOPHAGE-XR) 750 MG extended release tablet TAKE ONE TABLET BY MOUTH TWICE A DAY WITH MEALS, Disp-180 tablet, R-1Normal      blood glucose test strips (ASCENSIA AUTODISC VI;ONE TOUCH ULTRA TEST VI) strip Disp-100 each, R-3, NormalCheck glucose daily. DM 2 E11.9      Lancets MISC Disp-100 each, R-1, NormalCheck glucose daily. DM 2 E11.65. Blood Pressure KIT DAILY Starting Thu 7/18/2019, Disp-1 kit, R-0, Normal      Blood Glucose Monitoring Suppl (ACCU-CHEK ERLIN PLUS) w/Device KIT DAILY Starting u 10/25/2018, Disp-1 kit, R-0, Normal             Time Spent on discharge is more than 30 minutes in the examination, evaluation, counseling and review of medications and discharge plan. Signed:    Susie Walsh MD   3/15/2022      Thank you JACKIE Elmore for the opportunity to be involved in this patient's care. If you have any questions or concerns please feel free to contact me at [phone].

## 2022-03-16 ENCOUNTER — TELEPHONE (OUTPATIENT)
Dept: FAMILY MEDICINE CLINIC | Age: 47
End: 2022-03-16

## 2022-03-16 NOTE — TELEPHONE ENCOUNTER
[facility]    Outreach made within 2 business days of discharge: Yes    Patient: Rozina Ellison Patient : 1975   MRN: [id number]  Reason for Admission: There are no discharge diagnoses documented for the most recent discharge. Discharge Date: 3/14/22       Spoke with: Patient, states he had surgery and did not wish to schedule with pcp at this time. States he has post op scheduled for wound check     Discharge department/facility: [facility]    TCM Interactive Patient Contact:  Was patient able to fill all prescriptions: Yes  Was patient instructed to bring all medications to the follow-up visit: Yes  Is patient taking all medications as directed in the discharge summary?  Yes  Does patient understand their discharge instructions: Yes  Does patient have questions or concerns that need addressed prior to 7-14 day follow up office visit: no    Scheduled appointment with PCP within 7-14 days    Follow Up  Future Appointments   Date Time Provider Yao Ambrose   3/17/2022  2:30 PM Loreta Esparza MD AND [facility] Alaniz

## 2022-03-17 ENCOUNTER — OFFICE VISIT (OUTPATIENT)
Dept: SURGERY | Age: 47
End: 2022-03-17
Payer: COMMERCIAL

## 2022-03-17 VITALS — WEIGHT: 315 LBS | OXYGEN SATURATION: 97 % | HEIGHT: 76 IN | HEART RATE: 65 BPM | BODY MASS INDEX: 38.36 KG/M2

## 2022-03-17 DIAGNOSIS — L03.314 CELLULITIS OF GROIN: Primary | ICD-10-CM

## 2022-03-17 PROCEDURE — 99212 OFFICE O/P EST SF 10 MIN: CPT | Performed by: SURGERY

## 2022-03-17 NOTE — PROGRESS NOTES
HPI: Nursing notes reviewed. Patient with less pain. Some drainage. Feels swelling and erythema better. No fevers. ROS:  10 point review of systems performed with pertinent positives in HPI    Phys:    Wound - less deep, erythema less and at medial aspect of wound, good granulation    Assesment: 56 yo s/p left groin wound debridement    Plan: 1. Continue wet to dry dressing daily and abx   2. Call when finishing abx and if still erythema will extend one week   3.   F/u around 3/28

## 2022-03-31 ENCOUNTER — OFFICE VISIT (OUTPATIENT)
Dept: SURGERY | Age: 47
End: 2022-03-31
Payer: COMMERCIAL

## 2022-03-31 VITALS
BODY MASS INDEX: 38.36 KG/M2 | WEIGHT: 315 LBS | TEMPERATURE: 97.7 F | HEIGHT: 76 IN | DIASTOLIC BLOOD PRESSURE: 98 MMHG | HEART RATE: 66 BPM | SYSTOLIC BLOOD PRESSURE: 162 MMHG

## 2022-03-31 DIAGNOSIS — L03.314 CELLULITIS OF GROIN: Primary | ICD-10-CM

## 2022-03-31 PROCEDURE — 99212 OFFICE O/P EST SF 10 MIN: CPT | Performed by: SURGERY

## 2022-03-31 NOTE — PROGRESS NOTES
HPI: Nursing notes reviewed. Patient reports no pain. Less drainage. No fevers. ROS:  10 point review of systems performed with pertinent positives in HPI    Phys:    Left groin wound - 1.5x7x0.2cm with good granulation, no purulence, erythema resolved    Assesment: 56 yo s/p left groin debridement    Plan: 1. No signs of infection   2.   Switch to dry dressing only daily until fully healed

## 2022-06-28 ENCOUNTER — TELEPHONE (OUTPATIENT)
Dept: FAMILY MEDICINE CLINIC | Age: 47
End: 2022-06-28

## 2022-06-29 NOTE — TELEPHONE ENCOUNTER
Advised patient he needed to schedule a follow up. Patient stated he switched shifts at work and he would need to check his schedule and see when he could come in. He stated he would call us back to schedule. Advised patient that [facility] was leaving on Maternity leave and that he could schedule with Tammy Nicholas.

## 2022-08-17 DIAGNOSIS — I10 ESSENTIAL HYPERTENSION: ICD-10-CM

## 2022-08-17 RX ORDER — AMLODIPINE BESYLATE 5 MG/1
TABLET ORAL
Qty: 90 TABLET | Refills: 1 | Status: SHIPPED | OUTPATIENT
Start: 2022-08-17

## 2022-08-17 NOTE — TELEPHONE ENCOUNTER
Refill Request     CONFIRM preferrred pharmacy with the patient. If Mail Order Rx - Pend for 90 day refill. Last Seen: Last Seen Department: 1/13/2022  Last Seen by PCP: 1/13/2022    Last Written: 01/13/2022 90 tablet  1 refill    Next Appointment:   No future appointments. Message to [employer] Lorraine Meebo to schedule appointment. Return in about 6 months (around 7/13/2022) for Hyperlipidemia, DiabetesMellitus, HTN.         Requested Prescriptions     Pending Prescriptions Disp Refills    amLODIPine (NORVASC) 5 MG tablet [Pharmacy Med Name: amLODIPine BESYLATE 5 MG TAB] 90 tablet 1     Sig: TAKE ONE TABLET BY MOUTH DAILY

## 2022-09-07 ENCOUNTER — OFFICE VISIT (OUTPATIENT)
Dept: FAMILY MEDICINE CLINIC | Age: 47
End: 2022-09-07
Payer: COMMERCIAL

## 2022-09-07 VITALS — DIASTOLIC BLOOD PRESSURE: 80 MMHG | SYSTOLIC BLOOD PRESSURE: 138 MMHG | HEART RATE: 73 BPM | OXYGEN SATURATION: 95 %

## 2022-09-07 DIAGNOSIS — I10 ESSENTIAL HYPERTENSION: ICD-10-CM

## 2022-09-07 DIAGNOSIS — E66.01 CLASS 3 SEVERE OBESITY DUE TO EXCESS CALORIES WITH BODY MASS INDEX (BMI) OF 45.0 TO 49.9 IN ADULT, UNSPECIFIED WHETHER SERIOUS COMORBIDITY PRESENT (HCC): ICD-10-CM

## 2022-09-07 DIAGNOSIS — E11.65 UNCONTROLLED TYPE 2 DIABETES MELLITUS WITH HYPERGLYCEMIA (HCC): Primary | ICD-10-CM

## 2022-09-07 DIAGNOSIS — E78.00 PURE HYPERCHOLESTEROLEMIA: ICD-10-CM

## 2022-09-07 LAB — HBA1C MFR BLD: 11.1 %

## 2022-09-07 PROCEDURE — 3046F HEMOGLOBIN A1C LEVEL >9.0%: CPT | Performed by: NURSE PRACTITIONER

## 2022-09-07 PROCEDURE — 83036 HEMOGLOBIN GLYCOSYLATED A1C: CPT | Performed by: NURSE PRACTITIONER

## 2022-09-07 PROCEDURE — 99213 OFFICE O/P EST LOW 20 MIN: CPT | Performed by: NURSE PRACTITIONER

## 2022-09-07 RX ORDER — SEMAGLUTIDE 1.34 MG/ML
0.25 INJECTION, SOLUTION SUBCUTANEOUS WEEKLY
Qty: 1 ADJUSTABLE DOSE PRE-FILLED PEN SYRINGE | Refills: 2 | Status: SHIPPED | OUTPATIENT
Start: 2022-09-07 | End: 2022-10-24 | Stop reason: SDUPTHER

## 2022-09-07 RX ORDER — LOSARTAN POTASSIUM 50 MG/1
50 TABLET ORAL 2 TIMES DAILY
Qty: 180 TABLET | Refills: 1 | Status: SHIPPED | OUTPATIENT
Start: 2022-09-07

## 2022-09-07 RX ORDER — ATENOLOL 100 MG/1
TABLET ORAL
Qty: 90 TABLET | Refills: 1 | Status: SHIPPED | OUTPATIENT
Start: 2022-09-07

## 2022-09-07 ASSESSMENT — PATIENT HEALTH QUESTIONNAIRE - PHQ9
SUM OF ALL RESPONSES TO PHQ QUESTIONS 1-9: 2
1. LITTLE INTEREST OR PLEASURE IN DOING THINGS: 0
8. MOVING OR SPEAKING SO SLOWLY THAT OTHER PEOPLE COULD HAVE NOTICED. OR THE OPPOSITE, BEING SO FIGETY OR RESTLESS THAT YOU HAVE BEEN MOVING AROUND A LOT MORE THAN USUAL: 0
SUM OF ALL RESPONSES TO PHQ9 QUESTIONS 1 & 2: 0
2. FEELING DOWN, DEPRESSED OR HOPELESS: 0
SUM OF ALL RESPONSES TO PHQ QUESTIONS 1-9: 2
7. TROUBLE CONCENTRATING ON THINGS, SUCH AS READING THE NEWSPAPER OR WATCHING TELEVISION: 0
4. FEELING TIRED OR HAVING LITTLE ENERGY: 1
SUM OF ALL RESPONSES TO PHQ QUESTIONS 1-9: 2
10. IF YOU CHECKED OFF ANY PROBLEMS, HOW DIFFICULT HAVE THESE PROBLEMS MADE IT FOR YOU TO DO YOUR WORK, TAKE CARE OF THINGS AT HOME, OR GET ALONG WITH OTHER PEOPLE: 0
SUM OF ALL RESPONSES TO PHQ QUESTIONS 1-9: 2
5. POOR APPETITE OR OVEREATING: 0
3. TROUBLE FALLING OR STAYING ASLEEP: 1
9. THOUGHTS THAT YOU WOULD BE BETTER OFF DEAD, OR OF HURTING YOURSELF: 0
6. FEELING BAD ABOUT YOURSELF - OR THAT YOU ARE A FAILURE OR HAVE LET YOURSELF OR YOUR FAMILY DOWN: 0

## 2022-09-07 ASSESSMENT — ENCOUNTER SYMPTOMS
SHORTNESS OF BREATH: 0
CHEST TIGHTNESS: 0
ABDOMINAL PAIN: 0
COUGH: 0
CONSTIPATION: 0
NAUSEA: 0
WHEEZING: 0
VOMITING: 0
ABDOMINAL DISTENTION: 0
DIARRHEA: 0

## 2022-09-07 ASSESSMENT — ANXIETY QUESTIONNAIRES
GAD7 TOTAL SCORE: 0
6. BECOMING EASILY ANNOYED OR IRRITABLE: 0
3. WORRYING TOO MUCH ABOUT DIFFERENT THINGS: 0
4. TROUBLE RELAXING: 0
1. FEELING NERVOUS, ANXIOUS, OR ON EDGE: 0
2. NOT BEING ABLE TO STOP OR CONTROL WORRYING: 0
7. FEELING AFRAID AS IF SOMETHING AWFUL MIGHT HAPPEN: 0
5. BEING SO RESTLESS THAT IT IS HARD TO SIT STILL: 0

## 2022-09-07 NOTE — PROGRESS NOTES
2022  Estrada Azul (: 1975)  52 y.o.    ASSESSMENT and PLAN:  Aroldo Hodges was seen today for hypertension and diabetes. Diagnoses and all orders for this visit:    Uncontrolled type 2 diabetes mellitus with hyperglycemia (HCC)  -     POCT glycosylated hemoglobin (Hb A1C)  -     Dulaglutide 0.75 MG/0.5ML SOPN; Inject 0.75 mg into the skin once a week  -     Comprehensive Metabolic Panel; Future  -     CBC; Future  -     LIPID PANEL; Future  -     TSH with Reflex; Future  -Metformin not tolerated due to diarrhea even with lower doses. -Trial Ozempic, pharmacy about to get free month, while we wait on PA, reviewed administration instructions, weekly use, and potential side effects. Denies family history of thyroid C cell tumors, and medullary thyroid carcinoma. Instructed to call if insurance will not cover, or if any side effects experiencing so we could try alternative option.   -Recommend checking sugars at least every morning, goal 130  -declined microalbumin, and foot exam, wants to get at next visit. -update labs. -Declined diabetes educator. Recommend strict carb control, low sugar diet. Reviewed diet options and recommendations. -f/u in office in 4-6 wks for check to ensure improvement, and likely will adjust dose or add another medication at that time. Essential hypertension  -     atenolol (TENORMIN) 100 MG tablet; TAKE ONE TABLET BY MOUTH DAILY  -     losartan (COZAAR) 50 MG tablet; Take 1 tablet by mouth 2 times daily  -     Dulaglutide 0.75 MG/0.5ML SOPN; Inject 0.75 mg into the skin once a week  -     Comprehensive Metabolic Panel; Future  -     CBC; Future  -     LIPID PANEL; Future  -     TSH with Reflex; Future  -The current medical regimen is effective;  continue present plan and medications. -monitor bp intermittently especially with weight loss as bp may improve naturally.      Class 3 severe obesity due to excess calories with body mass index (BMI) of 45.0 to 49.9 in adult, unspecified whether serious comorbidity present (HCC)  -     Dulaglutide 0.75 MG/0.5ML SOPN; Inject 0.75 mg into the skin once a week  -     Comprehensive Metabolic Panel; Future  -     CBC; Future  -     LIPID PANEL; Future  -     TSH with Reflex; Future  -recommend improving diet, very active at work. -reviewed risks if weight and DM not controlled. Pure hypercholesterolemia  -due for updated labs. -not fasting today, recommend obtaining prior to next visit. Return in about 6 weeks (around 10/19/2022), or if symptoms worsen or fail to improve, for DM, HTN, HLD. HPI  Presenting for f/u on chronic conditions. DM: Not checking sugars at home. Stopped taking Metformin 750 mg XR BID due to diarrhea;   A1c 11.1 today. A1c 10.9% on 3/2022. Diet/exercise habits-is going to try bethea; walks frequently at job, works security. Denies vision changes, polyuria, polydipsia, hyper/hypoglycemic episodes, SOB, chest pain, neuropathy. Denies personal or family hx pancreatitis, history of thyroid C cell tumors, and medullary thyroid carcinoma  Last eye exam: up to date-[facility]  Last foot exam: due, deferred until next visit. Last microalbumin: due, deferred until next visit. Last monofilament: due, deferred until next visit. Last dental: overdue;     Recent groin infection 3/2022. Per patient it has healed well without further issue. HTN: Taking Atenolol 100 mg, amlodipine 5 mg, losartan 50 mg BID. No missed doses. Not checking at home. Denies chest pain, SOB, edema, headache, vision changes, palpitations, lightheadedness, dizziness. Denies Episodes of high or low bps. HLD-hx of myalgia with statins. Has been on crestor previously    HM  Colon cancer Screen-due  Hep c screen-due    Review of Systems   Constitutional:  Negative for activity change, appetite change, chills, fatigue, fever and unexpected weight change. HENT: Negative.      Respiratory:  Negative for cough, chest tightness, shortness of breath and wheezing. Cardiovascular:  Negative for chest pain, palpitations and leg swelling. Gastrointestinal:  Negative for abdominal distention, abdominal pain, constipation, diarrhea, nausea and vomiting. Genitourinary: Negative. Musculoskeletal: Negative. Skin: Negative. Neurological:  Negative for dizziness, weakness, light-headedness, numbness and headaches. Hematological: Negative. Psychiatric/Behavioral: Negative. Allergies, past medical history, family history, and social history reviewed and unchanged from previous encounter. Current Outpatient Medications   Medication Sig Dispense Refill    amLODIPine (NORVASC) 5 MG tablet TAKE ONE TABLET BY MOUTH DAILY 90 tablet 1    atenolol (TENORMIN) 100 MG tablet TAKE ONE TABLET BY MOUTH DAILY 90 tablet 1    losartan (COZAAR) 50 MG tablet Take 1 tablet by mouth 2 times daily 180 tablet 1    metFORMIN (GLUCOPHAGE-XR) 750 MG extended release tablet TAKE ONE TABLET BY MOUTH TWICE A DAY WITH MEALS (Patient not taking: Reported on 9/7/2022) 180 tablet 1    blood glucose test strips (ASCENSIA AUTODISC VI;ONE TOUCH ULTRA TEST VI) strip Check glucose daily. DM 2 E11.9 (Patient not taking: Reported on 9/7/2022) 100 each 3    Lancets MISC Check glucose daily. DM 2 E11.65. (Patient not taking: Reported on 9/7/2022) 100 each 1    Blood Pressure KIT 1 kit by Does not apply route daily (Patient not taking: Reported on 9/7/2022) 1 kit 0    Blood Glucose Monitoring Suppl (ACCU-CHEK ERLIN PLUS) w/Device KIT 1 Device by Does not apply route daily (Patient not taking: Reported on 9/7/2022) 1 kit 0     No current facility-administered medications for this visit. Vitals:    09/07/22 1512   BP: 138/80   Site: Right Upper Arm   Position: Sitting   Pulse: 73   SpO2: 95%     Estimated body mass index is 46.26 kg/m² as calculated from the following:    Height as of 3/31/22: 6' 4\" (1.93 m).     Weight as of 3/31/22: 380 lb (172.4 kg).    Physical Exam  Vitals reviewed. Constitutional:       Appearance: Normal appearance. He is obese. HENT:      Head: Normocephalic and atraumatic. Nose: Nose normal.   Eyes:      Conjunctiva/sclera: Conjunctivae normal.   Cardiovascular:      Rate and Rhythm: Normal rate and regular rhythm. Pulses: Normal pulses. Heart sounds: Normal heart sounds. Pulmonary:      Effort: Pulmonary effort is normal.   Abdominal:      General: Abdomen is flat. Bowel sounds are normal.      Palpations: Abdomen is soft. Musculoskeletal:         General: Normal range of motion. Cervical back: Normal range of motion and neck supple. Skin:     General: Skin is warm and dry. Capillary Refill: Capillary refill takes less than 2 seconds. Neurological:      General: No focal deficit present. Mental Status: He is alert and oriented to person, place, and time. Mental status is at baseline. Psychiatric:         Mood and Affect: Mood normal.         Behavior: Behavior normal.         Thought Content:  Thought content normal.         Judgment: Judgment normal.

## 2022-10-07 ENCOUNTER — HOSPITAL ENCOUNTER (EMERGENCY)
Age: 47
Discharge: HOME | End: 2022-10-07
Payer: COMMERCIAL

## 2022-10-07 ENCOUNTER — APPOINTMENT (OUTPATIENT)
Dept: GENERAL RADIOLOGY | Age: 47
End: 2022-10-07
Payer: COMMERCIAL

## 2022-10-07 VITALS
SYSTOLIC BLOOD PRESSURE: 186 MMHG | HEART RATE: 69 BPM | RESPIRATION RATE: 19 BRPM | DIASTOLIC BLOOD PRESSURE: 116 MMHG | OXYGEN SATURATION: 96 % | TEMPERATURE: 98.1 F

## 2022-10-07 DIAGNOSIS — R04.0 EPISTAXIS: Primary | ICD-10-CM

## 2022-10-07 DIAGNOSIS — I10 ESSENTIAL HYPERTENSION: ICD-10-CM

## 2022-10-07 PROCEDURE — 93005 ELECTROCARDIOGRAM TRACING: CPT | Performed by: NURSE PRACTITIONER

## 2022-10-07 PROCEDURE — 71045 X-RAY EXAM CHEST 1 VIEW: CPT

## 2022-10-07 PROCEDURE — 99284 EMERGENCY DEPT VISIT MOD MDM: CPT

## 2022-10-07 PROCEDURE — 6370000000 HC RX 637 (ALT 250 FOR IP): Performed by: NURSE PRACTITIONER

## 2022-10-07 RX ORDER — LOSARTAN POTASSIUM 25 MG/1
50 TABLET ORAL DAILY
Status: DISCONTINUED | OUTPATIENT
Start: 2022-10-07 | End: 2022-10-07 | Stop reason: HOSPADM

## 2022-10-07 RX ORDER — AMLODIPINE BESYLATE 5 MG/1
5 TABLET ORAL ONCE
Status: COMPLETED | OUTPATIENT
Start: 2022-10-07 | End: 2022-10-07

## 2022-10-07 RX ADMIN — AMLODIPINE BESYLATE 5 MG: 5 TABLET ORAL at 19:00

## 2022-10-07 RX ADMIN — LOSARTAN POTASSIUM 50 MG: 25 TABLET, FILM COATED ORAL at 19:00

## 2022-10-07 ASSESSMENT — ENCOUNTER SYMPTOMS
DIARRHEA: 0
RHINORRHEA: 0
VOMITING: 0
COUGH: 0
SHORTNESS OF BREATH: 0
SORE THROAT: 0
NAUSEA: 0
ABDOMINAL PAIN: 0
BLOOD IN STOOL: 0
EYE PAIN: 0
BACK PAIN: 0

## 2022-10-07 ASSESSMENT — PAIN - FUNCTIONAL ASSESSMENT
PAIN_FUNCTIONAL_ASSESSMENT: NONE - DENIES PAIN
PAIN_FUNCTIONAL_ASSESSMENT: NONE - DENIES PAIN

## 2022-10-07 NOTE — ED NOTES
Pt refusing blood work, stating he feels \"I just don't think any of that is necessary, I feel fine and I don't think anything is wrong. \" LIP aware and will speak to pt     Silvia Rockwell RN  10/07/22 5903

## 2022-10-07 NOTE — ED PROVIDER NOTES
[address] [facility]  ED  EMERGENCY DEPARTMENT ENCOUNTER        Pt Name: Indu Garnica  MRN: [id number]  [place] 1975  Date of evaluation: 10/7/2022  Provider: MICHELLE San - CNP  PCP: JACKIE Duarte  Note Started: 6:44 PM EDT      JAZ. I have evaluated this patient. My supervising physician was available for consultation. Triage CHIEF COMPLAINT       Chief Complaint   Patient presents with    Epistaxis     Nose bleed x 1 hour. Arrives with bleeding controlled. States \"it's stopped now. \"         HISTORY OF PRESENT ILLNESS   (Location/Symptom, Timing/Onset, Context/Setting, Quality, Duration, Modifying Factors, Severity)  Note limiting factors. Chief Complaint: Nosebleed from the right nare    Indu Garnica is a 52 y.o. male who presents to the emergency department with symptoms of nosebleed from the right side. Patient reported symptoms of bleeding began while he was at work. He states that he was finishing his day and began to have some bleeding. He states that was not associated with trauma or injury. States that while he was working it is noted it started bleeding spontaneously. States that he never had a problem with nosebleeds in the past states he started a new job on Monday and believes this may be due to the dry air. States that he was able to get his nosebleeding stopped just prior to arrival.  He states he had approximately 1 hours worth of intermittent nosebleeding. Denies the use of anticoagulation type meds again no trauma. No history of epistaxis or nosebleeding in the past.    Nursing Notes were all reviewed and agreed with or any disagreements were addressed in the HPI. REVIEW OF SYSTEMS    (2-9 systems for level 4, 10 or more for level 5)     Review of Systems   Constitutional:  Negative for chills, diaphoresis and fever. HENT:  Positive for nosebleeds. Negative for congestion, ear pain, rhinorrhea and sore throat.     Eyes:  Negative for pain and visual disturbance. Respiratory:  Negative for cough and shortness of breath. Cardiovascular:  Negative for chest pain and leg swelling. Gastrointestinal:  Negative for abdominal pain, blood in stool, diarrhea, nausea and vomiting. Genitourinary:  Negative for difficulty urinating, dysuria, flank pain and frequency. Musculoskeletal:  Negative for back pain and neck pain. Skin:  Negative for rash and wound. Neurological:  Negative for dizziness and light-headedness. PAST MEDICAL HISTORY     Past Medical History:   Diagnosis Date    Acid reflux     Hypertension     Sleep apnea        SURGICAL HISTORY     Past Surgical History:   Procedure Laterality Date    GROIN SURGERY Left 3/10/2022    LEFT GROIN DEBRIDEMENT performed by Marcelino aZmarripa MD at [address] [place] [address] ARTHROSCOPY Right     WISDOM TOOTH EXTRACTION Bilateral        [address]       Discharge Medication List as of 10/7/2022  7:55 PM        CONTINUE these medications which have NOT CHANGED    Details   atenolol (TENORMIN) 100 MG tablet TAKE ONE TABLET BY MOUTH DAILY, Disp-90 tablet, R-1Normal      losartan (COZAAR) 50 MG tablet Take 1 tablet by mouth 2 times daily, Disp-180 tablet, R-1Normal      Semaglutide,0.25 or 0.5MG/DOS, (OZEMPIC, 0.25 OR 0.5 MG/DOSE,) 2 MG/1.5ML SOPN Inject 0.25 mg into the skin once a week, Disp-1 Adjustable Dose Pre-filled Pen Syringe, R-2Normal      amLODIPine (NORVASC) 5 MG tablet TAKE ONE TABLET BY MOUTH DAILY, Disp-90 tablet, R-1Normal      metFORMIN (GLUCOPHAGE-XR) 750 MG extended release tablet TAKE ONE TABLET BY MOUTH TWICE A DAY WITH MEALS, Disp-180 tablet, R-1Normal      blood glucose test strips (ASCENSIA AUTODISC VI;ONE TOUCH ULTRA TEST VI) strip Disp-100 each, R-3, NormalCheck glucose daily. DM 2 E11.9      Lancets MISC Disp-100 each, R-1, NormalCheck glucose daily. DM 2 E11.65.       Blood Pressure KIT DAILY Starting Thu 7/18/2019, Disp-1 kit, R-0, Normal      Blood Glucose Monitoring Suppl (ACCU-CHEK ERLIN PLUS) w/Device KIT DAILY Starting Thu 10/25/2018, Disp-1 kit, R-0, Normal             ALLERGIES     Patient has no known allergies. FAMILYHISTORY       Family History   Problem Relation Age of Onset    Stroke Maternal Grandfather         SOCIAL HISTORY       Social History     Socioeconomic History    Marital status:      Spouse name: None    Number of children: 1    Years of education: None    Highest education level: None   Occupational History    Occupation:    Tobacco Use    Smoking status: Never    Smokeless tobacco: Former     Quit date: 2010   Substance and Sexual Activity    Alcohol use: Yes     Alcohol/week: 1.0 standard drink     Types: 1 Shots of liquor per week     Comment: occasional    Drug use: No    Sexual activity: Yes     Partners: Female       SCREENINGS    Frances Coma Scale  Eye Opening: Spontaneous  Best Verbal Response: Oriented  Best Motor Response: Obeys commands  [place] Coma Scale Score: 15        PHYSICAL EXAM    (up to 7 for level 4, 8 or more for level 5)     ED Triage Vitals [10/07/22 1817]   BP Temp Temp Source Heart Rate Resp SpO2 Height Weight   (S) (!) 214/115 98.1 °F (36.7 °C) Oral 74 18 98 % -- --       Physical Exam  Vitals and nursing note reviewed. Constitutional:       Appearance: Normal appearance. He is not toxic-appearing or diaphoretic. HENT:      Head: Normocephalic and atraumatic. Nose: Nose normal. No nasal tenderness or rhinorrhea. Right Nostril: No foreign body, epistaxis or septal hematoma. Left Nostril: No foreign body, epistaxis or septal hematoma. Eyes:      General:         Right eye: No discharge. Left eye: No discharge. Cardiovascular:      Rate and Rhythm: Normal rate. Musculoskeletal:         General: Normal range of motion. Cervical back: Normal range of motion and neck supple. Skin:     General: Skin is warm and dry. Neurological:      General: No focal deficit present. Mental Status: He is alert and oriented to person, place, and time. Psychiatric:         Mood and Affect: Mood normal.         Behavior: Behavior normal.       DIAGNOSTIC RESULTS   LABS:    Labs Reviewed   CBC WITH AUTO DIFFERENTIAL   COMPREHENSIVE METABOLIC PANEL   TROPONIN       When ordered, only abnormal lab results are displayed. All other labs were within normal range or not returned as of this dictation. EKG: When ordered, EKG's are interpreted by the Emergency Department Physician in the absence of a cardiologist.  Please see their note for interpretation of EKG. RADIOLOGY:   Non-plain film images such as CT, Ultrasound and MRI are read by the radiologist. Plain radiographic images are visualized andpreliminarily interpreted by the  ED Provider with the below findings:        Interpretation pert Radiologist below, if available at the time of this note:    XR CHEST PORTABLE   Final Result   Borderline cardiomegaly which is less prominent with no acute pulmonary   abnormality. No results found. PROCEDURES   Unless otherwise noted below, none     Procedures    CRITICAL CARE TIME   N/A    CONSULTS:  None      EMERGENCY DEPARTMENT COURSE and DIFFERENTIAL DIAGNOSIS/MDM:   Vitals:    Vitals:    10/07/22 1849 10/07/22 1900 10/07/22 1921 10/07/22 1955   BP: (!) 180/114 (!) 240/124 (!) 181/114 (!) 186/116   Pulse:    69   Resp:    19   Temp:       TempSrc:       SpO2:    96%       Patient was given thefollowing medications:  Medications   amLODIPine (NORVASC) tablet 5 mg (5 mg Oral Given 10/7/22 1900)         Is this patient to be included in the SEP-1 Core Measure due to severe sepsis or septic shock? No   Exclusion criteria - the patient is NOT to be included for SEP-1 Core Measure due to:  2+ SIRS criteria are not met    Performed and informed discharge. He refuses work-up here in the emergency department further with including laboratory work and further blood pressure management.   He states that he feels well. He states that he wants to discharge. He has no symptoms of chest pain or back pain. No symptoms of headache. He states he had a nosebleed and states that otherwise he feels well. He states that he wants to leave and is refusing any further work-up. I did not want to sign him again [address] but advised him that against my medical decision that he leaves without further work-up. He states that he will follow-up with his family doctor he states that last month his pressure was 739 systolic. He states that he does not believe our machine is correct. He was provided strict return precautions and advised on return back to ED for any further acute concerns. He verbalized understanding and agrees. I am the Primary Clinician of Record. FINAL IMPRESSION      1. Epistaxis    2.  Essential hypertension          DISPOSITION/PLAN   DISPOSITION Decision To Discharge 10/07/2022 07:44:22 PM      PAT[facility]EDTO:  [facility] [address]    Schedule an appointment as soon as possible for a visit       [facility], [address] [place] [address] [address]    Schedule an appointment as soon as possible for a visit       DISCHARGE MEDICATIONS:  Discharge Medication List as of 10/7/2022  7:55 PM          DISCONTINUED MEDICATIONS:  Discharge Medication List as of 10/7/2022  7:55 PM                 (Please note that portions ofthis note were completed with a voice recognition program.  Efforts were made to edit the dictations but occasionally words are mis-transcribed.)    MICHELLE Loredo CNP (electronically signed)             MICHELLE Loredo CNP  10/08/22 7559

## 2022-10-08 LAB
EKG ATRIAL RATE: 73 BPM
EKG DIAGNOSIS: NORMAL
EKG P AXIS: 35 DEGREES
EKG P-R INTERVAL: 178 MS
EKG Q-T INTERVAL: 372 MS
EKG QRS DURATION: 94 MS
EKG QTC CALCULATION (BAZETT): 409 MS
EKG R AXIS: 56 DEGREES
EKG T AXIS: 4 DEGREES
EKG VENTRICULAR RATE: 73 BPM

## 2022-10-13 ENCOUNTER — PATIENT MESSAGE (OUTPATIENT)
Dept: FAMILY MEDICINE CLINIC | Age: 47
End: 2022-10-13

## 2022-10-13 NOTE — TELEPHONE ENCOUNTER
From: Antonia Azul  To: Laura Moseley  Sent: 10/13/2022 3:41 PM EDT  Subject: Nosebleeds     Hello been getting nose bleeds one was very bad not sure what issue is had 3 in past week. Today had a headache and then nosebleed.

## 2022-10-13 NOTE — TELEPHONE ENCOUNTER
Monae Swift has an appointment already scheduled with you on 10/19/2022. Do you think he needs to be sooner to discuss this?

## 2022-10-21 ENCOUNTER — NURSE ONLY (OUTPATIENT)
Dept: FAMILY MEDICINE CLINIC | Age: 47
End: 2022-10-21

## 2022-10-21 VITALS — SYSTOLIC BLOOD PRESSURE: 168 MMHG | DIASTOLIC BLOOD PRESSURE: 96 MMHG

## 2022-10-21 DIAGNOSIS — E78.00 PURE HYPERCHOLESTEROLEMIA: ICD-10-CM

## 2022-10-21 DIAGNOSIS — E66.01 CLASS 3 SEVERE OBESITY DUE TO EXCESS CALORIES WITH BODY MASS INDEX (BMI) OF 45.0 TO 49.9 IN ADULT, UNSPECIFIED WHETHER SERIOUS COMORBIDITY PRESENT (HCC): ICD-10-CM

## 2022-10-21 DIAGNOSIS — I10 ESSENTIAL HYPERTENSION: ICD-10-CM

## 2022-10-21 DIAGNOSIS — E11.65 UNCONTROLLED TYPE 2 DIABETES MELLITUS WITH HYPERGLYCEMIA (HCC): ICD-10-CM

## 2022-10-21 RX ORDER — SEMAGLUTIDE 1.34 MG/ML
0.25 INJECTION, SOLUTION SUBCUTANEOUS WEEKLY
Qty: 1 ADJUSTABLE DOSE PRE-FILLED PEN SYRINGE | Refills: 2 | Status: CANCELLED | OUTPATIENT
Start: 2022-10-21

## 2022-10-22 DIAGNOSIS — E78.00 PURE HYPERCHOLESTEROLEMIA: ICD-10-CM

## 2022-10-22 DIAGNOSIS — E11.65 UNCONTROLLED TYPE 2 DIABETES MELLITUS WITH HYPERGLYCEMIA (HCC): ICD-10-CM

## 2022-10-22 DIAGNOSIS — E66.01 CLASS 3 SEVERE OBESITY DUE TO EXCESS CALORIES WITH BODY MASS INDEX (BMI) OF 45.0 TO 49.9 IN ADULT, UNSPECIFIED WHETHER SERIOUS COMORBIDITY PRESENT (HCC): ICD-10-CM

## 2022-10-22 DIAGNOSIS — I10 ESSENTIAL HYPERTENSION: ICD-10-CM

## 2022-10-23 NOTE — TELEPHONE ENCOUNTER
Refill Request     CONFIRM preferrred pharmacy with the patient. If Mail Order Rx - Pend for 90 day refill. Last Seen: Last Seen Department: 9/7/2022  Last Seen by PCP: 9/7/2022    Last Written: 9/7/2022- SENT TO WRONG PHARMACY< PENDED TO CORRECT    If no future appointment scheduled, route STAFF MESSAGE with patient name to the [facility] for scheduling. Next Appointment:   Future Appointments   Date Time Provider Yao Ambrose   11/18/2022  4:30 PM MICHELLE Enrique - CNP [place]  [place] [facility]       Message sent to  to schedule appt with patient?   NO      Requested Prescriptions     Pending Prescriptions Disp Refills    Semaglutide,0.25 or 0.5MG/DOS, (OZEMPIC, 0.25 OR 0.5 MG/DOSE,) 2 MG/1.5ML SOPN 1 Adjustable Dose Pre-filled Pen Syringe 2     Sig: Inject 0.25 mg into the skin once a week

## 2022-10-24 RX ORDER — SEMAGLUTIDE 1.34 MG/ML
0.25 INJECTION, SOLUTION SUBCUTANEOUS WEEKLY
Qty: 1 ADJUSTABLE DOSE PRE-FILLED PEN SYRINGE | Refills: 2 | Status: SHIPPED
Start: 2022-10-24 | End: 2022-10-24 | Stop reason: CLARIF

## 2022-10-24 RX ORDER — SEMAGLUTIDE 1.34 MG/ML
0.25 INJECTION, SOLUTION SUBCUTANEOUS WEEKLY
Qty: 1 ADJUSTABLE DOSE PRE-FILLED PEN SYRINGE | Refills: 2 | OUTPATIENT
Start: 2022-10-24

## 2022-10-24 NOTE — TELEPHONE ENCOUNTER
Please call pharmacy, I resent initial script prior to adjusting sig. Sig has been corrected and re-sent.    Thank you

## 2022-11-18 ENCOUNTER — OFFICE VISIT (OUTPATIENT)
Dept: FAMILY MEDICINE CLINIC | Age: 47
End: 2022-11-18
Payer: COMMERCIAL

## 2022-11-18 VITALS
BODY MASS INDEX: 49.79 KG/M2 | OXYGEN SATURATION: 95 % | HEART RATE: 79 BPM | SYSTOLIC BLOOD PRESSURE: 180 MMHG | DIASTOLIC BLOOD PRESSURE: 98 MMHG | WEIGHT: 315 LBS

## 2022-11-18 DIAGNOSIS — I10 ESSENTIAL HYPERTENSION: Primary | ICD-10-CM

## 2022-11-18 PROCEDURE — 3074F SYST BP LT 130 MM HG: CPT | Performed by: NURSE PRACTITIONER

## 2022-11-18 PROCEDURE — 3078F DIAST BP <80 MM HG: CPT | Performed by: NURSE PRACTITIONER

## 2022-11-18 PROCEDURE — 99213 OFFICE O/P EST LOW 20 MIN: CPT | Performed by: NURSE PRACTITIONER

## 2022-11-18 SDOH — ECONOMIC STABILITY: FOOD INSECURITY: WITHIN THE PAST 12 MONTHS, THE FOOD YOU BOUGHT JUST DIDN'T LAST AND YOU DIDN'T HAVE MONEY TO GET MORE.: NEVER TRUE

## 2022-11-18 SDOH — ECONOMIC STABILITY: FOOD INSECURITY: WITHIN THE PAST 12 MONTHS, YOU WORRIED THAT YOUR FOOD WOULD RUN OUT BEFORE YOU GOT MONEY TO BUY MORE.: NEVER TRUE

## 2022-11-18 ASSESSMENT — SOCIAL DETERMINANTS OF HEALTH (SDOH): HOW HARD IS IT FOR YOU TO PAY FOR THE VERY BASICS LIKE FOOD, HOUSING, MEDICAL CARE, AND HEATING?: NOT HARD AT ALL

## 2022-11-18 NOTE — PROGRESS NOTES
2022  Helen Azul (: 1975)  52 y.o.    ASSESSMENT and PLAN:  Krzysztof Fuentes was seen today for diabetes and hypertension. Diagnoses and all orders for this visit:    Essential hypertension  -not at goal.   -update labs. -would recommend f/u with cardiology for resistent htn.  -would ideally start hydrochlorothiazide, pt declines, would consider retrial of amlodipine 10 mg, and decrease atenolol if needed. HR wnl today.   -strict instructions to get labs over weekend,so med management can be initiated. Pt agreeable. Understands risk factors. Pt aware of alarm symptoms, and to go to ER if that occurs.   -Avoid NSAIDs, decongestants, steroids. -Advised low salt diet, high in vegetables, whole grains, and lean meats. Weight loss. Reduce intake of saturated/trans fats. -recommend weight loss to improve BP.   -reduce stress. Return in about 1 month (around 2022), or if symptoms worsen or fail to improve, for HTN. HPI  Htn-atenolol 100 mg in am, losartan 50 mg bid. Amlodipine 5 mg at night. Average bp at home around 160/90. Last visit bp was better controlled. No medication changes. Denies multiple missed doses. Weight is up from earlier this year. Previously was on amlodipine 10 mg, lowered to 5 mg due to low HR, previously was on HCTZ, Declines any diuretics as it is too difficult with current job to get to bathroom. Denies cp, sob, palpitations, headache, vision changes. Has been under stress recently. On Ozempic 0.5. requesting samples due to price. Tolerating well. Denies n/v. Denies abdominal pain. Review of Systems   Constitutional:  Negative for activity change, appetite change, chills, fatigue, fever and unexpected weight change. HENT: Negative. Respiratory:  Negative for cough, chest tightness, shortness of breath and wheezing. Cardiovascular:  Negative for chest pain, palpitations and leg swelling.    Gastrointestinal:  Negative for abdominal distention, abdominal pain, constipation, diarrhea, nausea and vomiting. Genitourinary: Negative. Musculoskeletal: Negative. Skin: Negative. Neurological:  Negative for dizziness, weakness, light-headedness, numbness and headaches. Hematological: Negative. Psychiatric/Behavioral: Negative. Allergies, past medical history, family history, and social history reviewed and unchanged from previous encounter. Current Outpatient Medications   Medication Sig Dispense Refill    Semaglutide,0.25 or 0.5MG/DOS, 2 MG/1.5ML SOPN Inject 0.25 mg into the skin once a week 4 Adjustable Dose Pre-filled Pen Syringe 0    atenolol (TENORMIN) 100 MG tablet TAKE ONE TABLET BY MOUTH DAILY 90 tablet 1    losartan (COZAAR) 50 MG tablet Take 1 tablet by mouth 2 times daily 180 tablet 1    amLODIPine (NORVASC) 5 MG tablet TAKE ONE TABLET BY MOUTH DAILY 90 tablet 1    metFORMIN (GLUCOPHAGE-XR) 750 MG extended release tablet TAKE ONE TABLET BY MOUTH TWICE A DAY WITH MEALS 180 tablet 1    blood glucose test strips (ASCENSIA AUTODISC VI;ONE TOUCH ULTRA TEST VI) strip Check glucose daily. DM 2 E11.9 (Patient taking differently: Check glucose daily. DM 2 E11.9) 100 each 3    Lancets MISC Check glucose daily. DM 2 E11.65. (Patient taking differently: Check glucose daily. DM 2 E11.65.) 100 each 1    Blood Pressure KIT 1 kit by Does not apply route daily 1 kit 0    Blood Glucose Monitoring Suppl (ACCU-CHEK ERLIN PLUS) w/Device KIT 1 Device by Does not apply route daily 1 kit 0     No current facility-administered medications for this visit. Vitals:    11/18/22 1640   BP: (!) 180/98   Site: Right Lower Arm   Position: Sitting   Cuff Size: Medium Adult   Pulse: 79   SpO2: 95%   Weight: (!) 409 lb (185.5 kg)     Estimated body mass index is 49.79 kg/m² as calculated from the following:    Height as of 3/31/22: 6' 4\" (1.93 m). Weight as of this encounter: 409 lb (185.5 kg). Physical Exam  Vitals reviewed.    Constitutional: Appearance: Normal appearance. HENT:      Head: Normocephalic and atraumatic. Nose: Nose normal.   Eyes:      Conjunctiva/sclera: Conjunctivae normal.   Cardiovascular:      Rate and Rhythm: Normal rate and regular rhythm. Pulses: Normal pulses. Heart sounds: Normal heart sounds. Pulmonary:      Effort: Pulmonary effort is normal.      Breath sounds: Normal breath sounds. Abdominal:      General: Abdomen is flat. Bowel sounds are normal.      Palpations: Abdomen is soft. Tenderness: There is no abdominal tenderness. Musculoskeletal:         General: Normal range of motion. Cervical back: Normal range of motion and neck supple. Skin:     General: Skin is warm and dry. Capillary Refill: Capillary refill takes less than 2 seconds. Neurological:      General: No focal deficit present. Mental Status: He is alert and oriented to person, place, and time. Mental status is at baseline. Psychiatric:         Mood and Affect: Mood normal.         Behavior: Behavior normal.         Thought Content:  Thought content normal.         Judgment: Judgment normal.

## 2022-11-25 ASSESSMENT — ENCOUNTER SYMPTOMS
ABDOMINAL DISTENTION: 0
CHEST TIGHTNESS: 0
SHORTNESS OF BREATH: 0
VOMITING: 0
NAUSEA: 0
CONSTIPATION: 0
ABDOMINAL PAIN: 0
COUGH: 0
WHEEZING: 0
DIARRHEA: 0

## 2022-11-30 ENCOUNTER — TELEPHONE (OUTPATIENT)
Dept: FAMILY MEDICINE CLINIC | Age: 47
End: 2022-11-30

## 2022-11-30 NOTE — TELEPHONE ENCOUNTER
Outgoing call to check on BP and follow up on recommendation to increase medication (amlodipine) from 5mg daily to 5mg twice daily. Spoke to patient and he states BPs ave remained the same from last visit. Patient declines increasing amlodipine, he states he told Naye Gagnon CNP at last visit that it drops his HR. Patient declined to schedule follow up at this time because he is at work. Advised to call to schedule follow up with PCP and come in for labs that he is overdue for.

## 2022-12-02 RX ORDER — HYDRALAZINE HYDROCHLORIDE 10 MG/1
10 TABLET, FILM COATED ORAL 3 TIMES DAILY
Qty: 90 TABLET | Refills: 1 | Status: SHIPPED | OUTPATIENT
Start: 2022-12-02 | End: 2023-12-02

## 2022-12-02 NOTE — TELEPHONE ENCOUNTER
Please Hydralyzine 10 mg 3x a day for bp control. Must make appointment with Lashay Diehl PCP for recheck in 1-2 wks. Please keep home bp log. Please help schedule if not already.      Hydralazine  (Apresoline)  USE--  Treat high blood pressure  SIDE EFFECTS--  Headache, feeling lightheaded

## 2022-12-06 ENCOUNTER — TELEPHONE (OUTPATIENT)
Dept: FAMILY MEDICINE CLINIC | Age: 47
End: 2022-12-06

## 2022-12-06 NOTE — TELEPHONE ENCOUNTER
----- Message from MICHELLE Munson CNP sent at 11/29/2022  9:35 PM EST -----  Could you help follow htn on this patient

## 2022-12-10 ENCOUNTER — HOSPITAL ENCOUNTER (OUTPATIENT)
Age: 47
Discharge: HOME | End: 2022-12-10
Payer: COMMERCIAL

## 2022-12-10 DIAGNOSIS — E11.65 UNCONTROLLED TYPE 2 DIABETES MELLITUS WITH HYPERGLYCEMIA (HCC): ICD-10-CM

## 2022-12-10 DIAGNOSIS — E66.01 CLASS 3 SEVERE OBESITY DUE TO EXCESS CALORIES WITH BODY MASS INDEX (BMI) OF 45.0 TO 49.9 IN ADULT, UNSPECIFIED WHETHER SERIOUS COMORBIDITY PRESENT (HCC): ICD-10-CM

## 2022-12-10 DIAGNOSIS — I10 ESSENTIAL HYPERTENSION: ICD-10-CM

## 2022-12-10 LAB
A/G RATIO: 1.5 (ref 1.1–2.2)
ALBUMIN SERPL-MCNC: 4.1 G/DL (ref 3.4–5)
ALP BLD-CCNC: 63 U/L (ref 40–129)
ALT SERPL-CCNC: 22 U/L (ref 10–40)
ANION GAP SERPL CALCULATED.3IONS-SCNC: 11 MMOL/L (ref 3–16)
AST SERPL-CCNC: 17 U/L (ref 15–37)
BILIRUB SERPL-MCNC: 0.6 MG/DL (ref 0–1)
BUN BLDV-MCNC: 12 MG/DL (ref 7–20)
CALCIUM SERPL-MCNC: 8.8 MG/DL (ref 8.3–10.6)
CHLORIDE BLD-SCNC: 102 MMOL/L (ref 99–110)
CHOLESTEROL, TOTAL: 171 MG/DL (ref 0–199)
CO2: 29 MMOL/L (ref 21–32)
CREAT SERPL-MCNC: 0.6 MG/DL (ref 0.9–1.3)
GFR SERPL CREATININE-BSD FRML MDRD: >60 ML/MIN/{1.73_M2}
GLUCOSE BLD-MCNC: 142 MG/DL (ref 70–99)
HCT VFR BLD CALC: 43.9 % (ref 40.5–52.5)
HDLC SERPL-MCNC: 26 MG/DL (ref 40–60)
HEMOGLOBIN: 14.7 G/DL (ref 13.5–17.5)
LDL CHOLESTEROL CALCULATED: 119 MG/DL
MCH RBC QN AUTO: 28 PG (ref 26–34)
MCHC RBC AUTO-ENTMCNC: 33.3 G/DL (ref 31–36)
MCV RBC AUTO: 83.9 FL (ref 80–100)
PDW BLD-RTO: 14.1 % (ref 12.4–15.4)
PLATELET # BLD: 302 K/UL (ref 135–450)
PMV BLD AUTO: 7.1 FL (ref 5–10.5)
POTASSIUM SERPL-SCNC: 4 MMOL/L (ref 3.5–5.1)
RBC # BLD: 5.24 M/UL (ref 4.2–5.9)
SODIUM BLD-SCNC: 142 MMOL/L (ref 136–145)
TOTAL PROTEIN: 6.9 G/DL (ref 6.4–8.2)
TRIGL SERPL-MCNC: 131 MG/DL (ref 0–150)
TSH REFLEX: 1.14 UIU/ML (ref 0.27–4.2)
VLDLC SERPL CALC-MCNC: 26 MG/DL
WBC # BLD: 7.2 K/UL (ref 4–11)

## 2022-12-10 PROCEDURE — 84443 ASSAY THYROID STIM HORMONE: CPT

## 2022-12-10 PROCEDURE — 80053 COMPREHEN METABOLIC PANEL: CPT

## 2022-12-10 PROCEDURE — 80061 LIPID PANEL: CPT

## 2022-12-10 PROCEDURE — 36415 COLL VENOUS BLD VENIPUNCTURE: CPT

## 2022-12-10 PROCEDURE — 85027 COMPLETE CBC AUTOMATED: CPT

## 2022-12-15 ENCOUNTER — OFFICE VISIT (OUTPATIENT)
Dept: FAMILY MEDICINE CLINIC | Age: 47
End: 2022-12-15
Payer: COMMERCIAL

## 2022-12-15 VITALS
WEIGHT: 315 LBS | DIASTOLIC BLOOD PRESSURE: 96 MMHG | OXYGEN SATURATION: 94 % | HEART RATE: 79 BPM | BODY MASS INDEX: 38.36 KG/M2 | SYSTOLIC BLOOD PRESSURE: 140 MMHG | HEIGHT: 76 IN | TEMPERATURE: 98.3 F

## 2022-12-15 DIAGNOSIS — E11.9 TYPE 2 DIABETES MELLITUS WITHOUT COMPLICATION, WITH LONG-TERM CURRENT USE OF INSULIN (HCC): Primary | ICD-10-CM

## 2022-12-15 DIAGNOSIS — Z79.4 TYPE 2 DIABETES MELLITUS WITHOUT COMPLICATION, WITH LONG-TERM CURRENT USE OF INSULIN (HCC): Primary | ICD-10-CM

## 2022-12-15 DIAGNOSIS — E66.01 MORBID OBESITY WITH BMI OF 50.0-59.9, ADULT (HCC): ICD-10-CM

## 2022-12-15 DIAGNOSIS — F43.10 PTSD (POST-TRAUMATIC STRESS DISORDER): ICD-10-CM

## 2022-12-15 DIAGNOSIS — I10 PRIMARY HYPERTENSION: ICD-10-CM

## 2022-12-15 LAB — HBA1C MFR BLD: 7.3 %

## 2022-12-15 PROCEDURE — 3074F SYST BP LT 130 MM HG: CPT | Performed by: PHYSICIAN ASSISTANT

## 2022-12-15 PROCEDURE — 83036 HEMOGLOBIN GLYCOSYLATED A1C: CPT | Performed by: PHYSICIAN ASSISTANT

## 2022-12-15 PROCEDURE — 3051F HG A1C>EQUAL 7.0%<8.0%: CPT | Performed by: PHYSICIAN ASSISTANT

## 2022-12-15 PROCEDURE — 99212 OFFICE O/P EST SF 10 MIN: CPT | Performed by: PHYSICIAN ASSISTANT

## 2022-12-15 PROCEDURE — 3078F DIAST BP <80 MM HG: CPT | Performed by: PHYSICIAN ASSISTANT

## 2022-12-15 ASSESSMENT — ENCOUNTER SYMPTOMS
CONSTIPATION: 0
COUGH: 0
VOMITING: 0
ABDOMINAL PAIN: 0
SHORTNESS OF BREATH: 0
SORE THROAT: 0
DIARRHEA: 0
NAUSEA: 0
RHINORRHEA: 0

## 2022-12-15 NOTE — PROGRESS NOTES
12/15/2022  Sil Azul (: 1975)  52 y.o.    ASSESSMENT and PLAN:  Cheng Khan was seen today for blood pressure check. Diagnoses and all orders for this visit:    Type 2 diabetes mellitus without complication, with long-term current use of insulin (HCC)  -     POCT glycosylated hemoglobin (Hb A1C) 7.4  - much improved almost at goal, recommend increasing dose to 1 mg ozempic weekly. Patient ended appointment prior to discussion, will send LISNR message.   - did not have the chance to discuss statin. Primary hypertension  - improved, however, remains uncontrolled- not amenable to diuretic or amlodipine increase  - recommend cpap compliance and cards follow up. PTSD (post-traumatic stress disorder)  - pt following with psychology   - pt requesting paperwork completion, unfortunately since we are not treating this condition, we are unable to fill out the paperwork. Encouraged him to follow up with psychiatry which he declined. He plans on establishing with new PCP. I asked patient to notify office of new Pcp and we will fax records. No follow-ups on file. HPI    Htn-atenolol 100 mg in am, losartan 50 mg bid. Amlodipine 5 mg at night. Added hydralazine 10 mg TID to regimen at previous appointment. Pt reports imprvoement in home bps 150s/90s. Previously was on amlodipine 10 mg, lowered to 5 mg due to low HR, previously was on HCTZ, Declines any diuretics as it is too difficult with current job to get to bathroom. Denies cp, sob, palpitations, headache, vision changes. Has been under stress recently. On Ozempic 0.5. weekly. Tolerating well. Denies n/v. Denies abdominal pain. Reports BG are improving, denies lows. 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 His psychologist is no longer able to complete paperwork, requesting our office complete. Not currently on any medication.      Review of Systems   Constitutional:  Negative for activity change, chills and fever. HENT:  Negative for congestion, ear pain, rhinorrhea and sore throat. Eyes:  Negative for visual disturbance. Respiratory:  Negative for cough and shortness of breath. Cardiovascular:  Negative for chest pain and palpitations. Gastrointestinal:  Negative for abdominal pain, constipation, diarrhea, nausea and vomiting. Genitourinary:  Negative for difficulty urinating and dysuria. Musculoskeletal:  Negative for arthralgias and myalgias. Skin:  Negative for rash. Neurological:  Negative for dizziness, weakness and numbness. Psychiatric/Behavioral:  Negative for sleep disturbance. Allergies, past medical history, family history, and social history reviewed and unchanged from previous encounter. Current Outpatient Medications   Medication Sig Dispense Refill    hydrALAZINE (APRESOLINE) 10 MG tablet Take 1 tablet by mouth 3 times daily 90 tablet 1    Semaglutide,0.25 or 0.5MG/DOS, 2 MG/1.5ML SOPN Inject 0.25 mg into the skin once a week 4 Adjustable Dose Pre-filled Pen Syringe 0    atenolol (TENORMIN) 100 MG tablet TAKE ONE TABLET BY MOUTH DAILY 90 tablet 1    losartan (COZAAR) 50 MG tablet Take 1 tablet by mouth 2 times daily 180 tablet 1    amLODIPine (NORVASC) 5 MG tablet TAKE ONE TABLET BY MOUTH DAILY 90 tablet 1    metFORMIN (GLUCOPHAGE-XR) 750 MG extended release tablet TAKE ONE TABLET BY MOUTH TWICE A DAY WITH MEALS 180 tablet 1    blood glucose test strips (ASCENSIA AUTODISC VI;ONE TOUCH ULTRA TEST VI) strip Check glucose daily. DM 2 E11.9 (Patient taking differently: Check glucose daily. DM 2 E11.9) 100 each 3    Lancets MISC Check glucose daily. DM 2 E11.65. (Patient taking differently: Check glucose daily.   DM 2 E11.65.) 100 each 1    Blood Pressure KIT 1 kit by Does not apply route daily 1 kit 0    Blood Glucose Monitoring Suppl (ACCU-CHEK ERLIN PLUS) w/Device KIT 1 Device by Does not apply route daily 1 kit 0     No current facility-administered medications for this visit. Vitals:    12/15/22 1554   BP: (!) 140/96   Site: Right Lower Arm   Position: Sitting   Cuff Size: Large Adult   Pulse: 79   Temp: 98.3 °F (36.8 °C)   TempSrc: Oral   SpO2: 94%   Weight: (!) 409 lb (185.5 kg)   Height: 6' 4\" (1.93 m)     Estimated body mass index is 49.79 kg/m² as calculated from the following:    Height as of this encounter: 6' 4\" (1.93 m). Weight as of this encounter: 409 lb (185.5 kg).

## 2023-01-28 NOTE — TELEPHONE ENCOUNTER
Refill Request     CONFIRM preferrred pharmacy with the patient. If Mail Order Rx - Pend for 90 day refill. Last Seen: Last Seen Department: 12/15/2022  Last Seen by PCP: 11/18/2022    Last Written: 12/2/2022    If no future appointment scheduled, route STAFF MESSAGE with patient name to the [facility] for scheduling. Next Appointment:   No future appointments. Message sent to [address] [place] [address] to schedule appt with patient?   NO      Requested Prescriptions     Pending Prescriptions Disp Refills    hydrALAZINE (APRESOLINE) 10 MG tablet [Pharmacy Med Name: hydrALAZINE 10 MG TABLET] 90 tablet 1     Sig: TAKE ONE TABLET BY MOUTH THREE TIMES A DAY

## 2023-01-30 RX ORDER — HYDRALAZINE HYDROCHLORIDE 10 MG/1
TABLET, FILM COATED ORAL
Qty: 90 TABLET | Refills: 1 | Status: SHIPPED | OUTPATIENT
Start: 2023-01-30

## 2023-02-01 ENCOUNTER — TELEPHONE (OUTPATIENT)
Dept: ADMINISTRATIVE | Age: 48
End: 2023-02-01

## 2023-02-01 NOTE — TELEPHONE ENCOUNTER
Submitted PA for Ozempic (0.25 or 0.5 MG/DOSE) 2MG/1.5ML pen-injectors, Key: [id number]. Medication has been APPROVED. Please notify patient. Thank you.

## 2023-03-04 DIAGNOSIS — I10 ESSENTIAL HYPERTENSION: ICD-10-CM

## 2023-03-04 NOTE — TELEPHONE ENCOUNTER
Refill Request     CONFIRM preferrred pharmacy with the patient. If Mail Order Rx - Pend for 90 day refill. Last Seen: Last Seen Department: 12/15/2022  Last Seen by PCP: 11/18/2022    Last Written: 9/7/2022    If no future appointment scheduled, route STAFF MESSAGE with patient name to the [facility] for scheduling. Next Appointment:   No future appointments. Message sent to [address] [place] [address] to schedule appt with patient?   NO      Requested Prescriptions     Pending Prescriptions Disp Refills    losartan (COZAAR) 50 MG tablet [Pharmacy Med Name: LOSARTAN POTASSIUM 50 MG TAB] 180 tablet 1     Sig: TAKE ONE TABLET BY MOUTH TWICE A DAY

## 2023-03-08 RX ORDER — LOSARTAN POTASSIUM 50 MG/1
TABLET ORAL
Qty: 180 TABLET | Refills: 1 | Status: SHIPPED | OUTPATIENT
Start: 2023-03-08

## 2023-03-15 DIAGNOSIS — I10 ESSENTIAL HYPERTENSION: ICD-10-CM

## 2023-03-15 RX ORDER — ATENOLOL 100 MG/1
TABLET ORAL
Qty: 90 TABLET | Refills: 1 | Status: SHIPPED | OUTPATIENT
Start: 2023-03-15

## 2023-03-15 NOTE — TELEPHONE ENCOUNTER
.Refill Request     CONFIRM preferred pharmacy with the patient. If Mail Order Rx - Pend for 90 day refill. Last Seen: Last Seen Department: 12/15/2022  Last Seen by PCP: 11/18/2022    Last Written: 9-7-22 90 with 1     If no future appointment scheduled, route STAFF MESSAGE with patient name to the [facility] for scheduling. Next Appointment:   No future appointments. Message sent to [address] [place] [address] to schedule appt with patient?   YES      Requested Prescriptions     Pending Prescriptions Disp Refills    atenolol (TENORMIN) 100 MG tablet [Pharmacy Med Name: ATENOLOL 100 MG TABLET] 90 tablet 1     Sig: TAKE ONE TABLET BY MOUTH DAILY

## 2023-04-06 ENCOUNTER — TELEPHONE (OUTPATIENT)
Dept: FAMILY MEDICINE CLINIC | Age: 48
End: 2023-04-06

## 2023-04-06 NOTE — TELEPHONE ENCOUNTER
Submitted PA for Ozempic (0.25 or 0.5 MG/DOSE) 2MG/3ML pen-injectors, Key: [id number]. Status: Approved, Coverage Starts on: 4/6/2023 12:00:00 AM, Coverage Ends on: 4/5/2024. Please notify patient. Thank you.

## (undated) DEVICE — BANDAGE,GAUZE,4.5"X4.1YD,STERILE,LF: Brand: MEDLINE

## (undated) DEVICE — CHLORAPREP 26ML ORANGE

## (undated) DEVICE — HANDPIECE SET WITH HIGH FLOW TIP AND SUCTION TUBE: Brand: INTERPULSE

## (undated) DEVICE — PAD,ABDOMINAL,8"X10",ST,LF: Brand: MEDLINE

## (undated) DEVICE — GLOVE,SURG,SENSICARE SLT,LF,PF,7: Brand: MEDLINE

## (undated) DEVICE — GAUZE,SPONGE,4"X4",8PLY,STRL,LF,10/TRAY: Brand: MEDLINE

## (undated) DEVICE — COVER XR CASS W20XL41IN UNIV ADH STRP

## (undated) DEVICE — Device

## (undated) DEVICE — KERLIX STER GAUZ 225INX3YDS/RL